# Patient Record
Sex: MALE | Race: WHITE | Employment: OTHER | URBAN - METROPOLITAN AREA
[De-identification: names, ages, dates, MRNs, and addresses within clinical notes are randomized per-mention and may not be internally consistent; named-entity substitution may affect disease eponyms.]

---

## 2017-04-06 ENCOUNTER — TELEPHONE (OUTPATIENT)
Dept: CARDIOLOGY CLINIC | Age: 67
End: 2017-04-06

## 2017-04-06 NOTE — TELEPHONE ENCOUNTER
Last OV Dec 2016. Metoprolol was switched to coreg. Goal BP per Dr Ani Marroquin  135/85. Was ok for a while then he started getting readings in the 140's and last week the PCP got 160/78. He has not had it checked since last Tuesday. He is taking the coreg, diltiazem and lisinopril/hct.

## 2017-04-06 NOTE — TELEPHONE ENCOUNTER
He did not know his HR so he will call the doctor's office and find out. I asked that he check his BP & HR today and do the same tomorrow AM and call me then we will know what med to change.

## 2017-04-06 NOTE — TELEPHONE ENCOUNTER
What is his HR? If HR > 65, then increase Coreg to 37.5 BID   If HR < 65, then increase lisinopril-HCTZ to 20-25. Goal BP at home < 135/85     Tell  him to let us know if BP remains high.      Thanks,   SK

## 2017-04-07 ENCOUNTER — TELEPHONE (OUTPATIENT)
Dept: CARDIOLOGY CLINIC | Age: 67
End: 2017-04-07

## 2017-04-07 NOTE — TELEPHONE ENCOUNTER
He said his BP at his pcp office the other day was 70 and today 73 w//93. Based on Dr Clemencia Carter instructions from yesterday have asked the pt to increase the coreg to 1 & 1/2 tabs twice daily. He understands. I told him to monitor his BP/HR for us but it may take a couple of weeks for the new dose to take effect.

## 2017-04-07 NOTE — TELEPHONE ENCOUNTER
Patient states that he is calling to follow up with bp and pulse readings. Can be reached at [phone]. Thanks!

## 2017-05-16 ENCOUNTER — TELEPHONE (OUTPATIENT)
Dept: CARDIOLOGY CLINIC | Age: 67
End: 2017-05-16

## 2017-05-16 NOTE — TELEPHONE ENCOUNTER
Pt calling in reference to his carvedilol. He stated he is still having the same issues and is almost out of medication. He requested a call back to [phone]. Thanks!

## 2017-05-16 NOTE — TELEPHONE ENCOUNTER
From phone message, early April, Dr Denise Heredia increased the coreg to 37.5 mg twice daily because his pressure was running the the 170's/90's and HR wsa low to mid 70's. He is now calling because HR still running around 72-73 and 's/low to mid 80's.

## 2017-05-17 RX ORDER — CARVEDILOL 25 MG/1
37.5 TABLET ORAL 2 TIMES DAILY WITH MEALS
Qty: 270 TAB | Refills: 3 | Status: SHIPPED | OUTPATIENT
Start: 2017-05-17 | End: 2018-05-02 | Stop reason: SDUPTHER

## 2017-05-17 RX ORDER — LISINOPRIL AND HYDROCHLOROTHIAZIDE 20; 25 MG/1; MG/1
1 TABLET ORAL DAILY
Qty: 30 TAB | Refills: 3 | Status: SHIPPED | OUTPATIENT
Start: 2017-05-17 | End: 2017-05-19 | Stop reason: DRUGHIGH

## 2017-05-18 ENCOUNTER — TELEPHONE (OUTPATIENT)
Dept: CARDIOLOGY CLINIC | Age: 67
End: 2017-05-18

## 2017-05-18 NOTE — TELEPHONE ENCOUNTER
Pt calling with questions about the increase to his Lisinopril. He can be reached at [phone].  [employer]

## 2017-05-18 NOTE — TELEPHONE ENCOUNTER
Ok - let's please correct the chart to the correct doses of meds he is taking. Continue lisinopril-HCTZ 40-25 if that is what he is taking. If HR still > 70 bpm, increase Diltiazem to 300 mg daily. Make sure he is watching his diet and keeping salt intake low. Have him see me in a few weeks. Have him bring all his meds to his next appt.      Thanks,   [employer]

## 2017-05-18 NOTE — TELEPHONE ENCOUNTER
Pt tells me that we had the incorrect dose of lisinopril in his chart. He WAS taking lisinopril-HCT 20/12.5 mg two tabs daily for a total of 40/25 mg. We thought it was one 20/12.5 mg pill & just this week, based on pt's BP results, Dr Shayla Knapp \"increased\" it to 20/25 mg leaving the lisinopril dose actually LESS than original & HCT exactly the same. Will forward to Dr Shayla Knapp for other adjustments.

## 2017-05-19 RX ORDER — LISINOPRIL AND HYDROCHLOROTHIAZIDE 12.5; 2 MG/1; MG/1
2 TABLET ORAL DAILY
Qty: 1 TAB | Refills: 0 | Status: SHIPPED | OUTPATIENT
Start: 2017-05-19 | End: 2017-10-31 | Stop reason: SDUPTHER

## 2017-05-19 RX ORDER — DILTIAZEM HYDROCHLORIDE 300 MG/1
300 CAPSULE, COATED, EXTENDED RELEASE ORAL DAILY
Qty: 30 CAP | Refills: 6 | Status: SHIPPED | OUTPATIENT
Start: 2017-05-19 | End: 2017-12-23 | Stop reason: SDUPTHER

## 2017-05-22 NOTE — TELEPHONE ENCOUNTER
He said right after he called us he got a phone call from the pharmacy that his script was ready. He is ok now.

## 2017-05-22 NOTE — TELEPHONE ENCOUNTER
Please call  Ciro Keke at [phone]. Re: elevated BP and medication.      Thank you, Sumeet Neumann

## 2017-10-31 RX ORDER — LISINOPRIL AND HYDROCHLOROTHIAZIDE 12.5; 2 MG/1; MG/1
2 TABLET ORAL DAILY
Qty: 60 TAB | Refills: 0 | Status: SHIPPED | OUTPATIENT
Start: 2017-10-31 | End: 2017-11-24 | Stop reason: SDUPTHER

## 2018-01-03 ENCOUNTER — OFFICE VISIT (OUTPATIENT)
Dept: CARDIOLOGY CLINIC | Age: 68
End: 2018-01-03

## 2018-01-03 VITALS
BODY MASS INDEX: 41.1 KG/M2 | OXYGEN SATURATION: 94 % | WEIGHT: 247 LBS | HEART RATE: 77 BPM | DIASTOLIC BLOOD PRESSURE: 100 MMHG | SYSTOLIC BLOOD PRESSURE: 160 MMHG

## 2018-01-03 DIAGNOSIS — I25.10 CORONARY ARTERY DISEASE INVOLVING NATIVE CORONARY ARTERY OF NATIVE HEART WITHOUT ANGINA PECTORIS: Primary | Chronic | ICD-10-CM

## 2018-01-03 DIAGNOSIS — E78.5 DYSLIPIDEMIA: ICD-10-CM

## 2018-01-03 DIAGNOSIS — I10 ESSENTIAL HYPERTENSION: Chronic | ICD-10-CM

## 2018-01-03 RX ORDER — DILTIAZEM HYDROCHLORIDE 300 MG/1
CAPSULE, COATED, EXTENDED RELEASE ORAL
Qty: 30 CAP | Refills: 0
Start: 2018-01-03 | End: 2018-05-02 | Stop reason: SDUPTHER

## 2018-01-03 RX ORDER — FLUTICASONE PROPIONATE 50 MCG
1 SPRAY, SUSPENSION (ML) NASAL DAILY
COMMUNITY
End: 2018-03-07

## 2018-01-03 RX ORDER — AZELASTINE HCL 205.5 UG/1
1 SPRAY NASAL DAILY
COMMUNITY

## 2018-01-03 RX ORDER — OMEPRAZOLE 20 MG/1
20 CAPSULE, DELAYED RELEASE ORAL DAILY
COMMUNITY

## 2018-01-03 NOTE — MR AVS SNAPSHOT
Visit Information Date & Time Provider Department Dept. Phone Encounter #  
 1/3/2018  4:00 PM David Case MD [facility] [place] [phone] [id number] Upcoming Health Maintenance Date Due Hepatitis C Screening 1950 FOOT EXAM Q1 7/16/1961 MICROALBUMIN Q1 7/16/1961 EYE EXAM RETINAL OR DILATED Q1 7/16/1961 DTaP/Tdap/Td series (1 - Tdap) 7/16/1972 FOBT Q 1 YEAR AGE 50-75 7/16/2001 ZOSTER VACCINE AGE 60> 5/16/2011 HEMOGLOBIN A1C Q6M 9/23/2015 GLAUCOMA SCREENING Q2Y 7/16/2016 MEDICARE YEARLY EXAM 7/16/2016 LIPID PANEL Q1 10/23/2016 Influenza Age 5 to Adult 8/1/2017 Pneumococcal 65+ Low/Medium Risk (2 of 2 - PPSV23) 12/1/2019 Allergies as of 1/3/2018  Review Complete On: 1/3/2018 By: David Case MD  
  
 Severity Noted Reaction Type Reactions Amoxicillin Medium 05/23/2013   Systemic Hives Current Immunizations  Reviewed on 3/25/2015 Name Date Influenza Vaccine 11/25/2014 Pneumococcal Vaccine (Unspecified Type) 12/1/2014 Not reviewed this visit You Were Diagnosed With   
  
 Codes Comments Coronary artery disease involving native coronary artery of native heart without angina pectoris    -  Primary ICD-10-CM: I25.10 ICD-9-CM: 414.01 Essential hypertension     ICD-10-CM: I10 
ICD-9-CM: 401.9 Dyslipidemia     ICD-10-CM: E78.5 ICD-9-CM: 272.4 Vitals BP Pulse Weight(growth percentile) SpO2 BMI Smoking Status (!) 160/100 (BP 1 Location: Left arm, BP Patient Position: Sitting) 77 247 lb (112 kg) 94% 41.1 kg/m2 Never Smoker Vitals History BMI and BSA Data Body Mass Index Body Surface Area  
 41.1 kg/m 2 2.27 m 2 Preferred Pharmacy Pharmacy Name Phone [phone] [facility] [address] [phone] Your Updated Medication List  
  
   
This list is accurate as of: 1/3/18  4:41 PM.  Always use your most recent med list.  
  
  
 ALEVE 220 mg tablet Generic drug:  naproxen sodium Take 220 mg by mouth once as needed for Pain. aspirin delayed-release 81 mg tablet Take 81 mg by mouth daily. Azelastine 0.15 % (205.5 mcg) nasal spray Commonly known as:  ASTEPRO  
1 Spray daily. carvedilol 25 mg tablet Commonly known as:  [place] Sloop Take 1.5 Tabs by mouth two (2) times daily (with meals). cetirizine 10 mg tablet Commonly known as:  ZYRTEC Take 10 mg by mouth nightly. cpap machine kit  
by Does Not Apply route. dilTIAZem  mg ER capsule Commonly known as:  CARDIZEM CD  
TAKE ONE CAPSULE BY MOUTH ONCE DAILY DOSE  ADJUSTMENT  PER  DR Mariana Phillips 137-50 mcg/spray [place] Generic drug:  azelastine-fluticasone  
by Nasal route two (2) times a day. FARXIGA 10 mg Tab tablet Generic drug:  dapagliflozin Take  by mouth daily. FLONASE 50 mcg/actuation nasal spray Generic drug:  fluticasone 1 [place] by Both Nostrils route daily. Indications: IN CONJUNTION WITH AZELASTINE  
  
 LANTUS 100 unit/mL injection Generic drug:  insulin glargine 200 Units daily. 60 units in the morning and 120 units in the evening. Indications: TOUJEO NOT LANTUS  
  
 lisinopril-hydroCHLOROthiazide 20-12.5 mg per tablet Commonly known as:  PRINZIDE, ZESTORETIC  
TAKE TWO TABLETS BY MOUTH ONCE DAILY  
  
 montelukast 10 mg tablet Commonly known as:  SINGULAIR Take 10 mg by mouth daily. omeprazole 20 mg capsule Commonly known as:  PRILOSEC Take 20 mg by mouth daily. rosuvastatin 20 mg tablet Commonly known as:  CRESTOR Take 1 Tab by mouth nightly. TRADJENTA 5 mg tablet Generic drug:  linagliptin Take 5 mg by mouth nightly. We Performed the Following ALDOSTERONE/RENIN RATIO [[id number] Custom] AMB POC EKG ROUTINE W/ 12 LEADS, INTER & REP [93610 CPT(R)] CBC W/O DIFF [84908 CPT(R)] LIPID PANEL [65106 CPT(R)] METABOLIC PANEL, COMPREHENSIVE [92509 CPT(R)] TSH 3RD GENERATION [47617 CPT(R)] Introducing [facility]! Dear Annabelle Del Rosario: Thank you for requesting a [employer] account. Our records indicate that you already have an active [employer] account. You can access your account anytime at [email or web address] Did you know that you can access your hospital and ER discharge instructions at any time in [employer]? You can also review all of your test results from your hospital stay or ER visit. Additional Information If you have questions, please visit the Frequently Asked Questions section of the [employer] website at [email or web address]/. Remember, [employer] is NOT to be used for urgent needs. For medical emergencies, dial 911. Now available from your iPhone and Android! Please provide this summary of care documentation to your next provider. Your primary care clinician is listed as Kym Ogden. If you have any questions after today's visit, please call [phone].

## 2018-01-03 NOTE — PROGRESS NOTES
Man Walsh MD. [facility]              Patient: Maisha Santana  : 1950      Today's Date: 1/3/2018        HISTORY OF PRESENT ILLNESS:     History of Present Illness:  Mr. Juhi Schwab is here for follow-up. No complaints. No CP or SOB. No dizziness. Taking his meds. PAST MEDICAL HISTORY:     Past Medical History:   Diagnosis Date    CAD (coronary artery disease)     NSTEMI 3/21/15; CABG 3/24/15;  LVEF 55%    [facility] ([facility] [address]     Dyslipidemia     FH ischemic heart disease     HTN (hypertension)     Hypertension     NSTEMI (non-ST elevated myocardial infarction) ([facility] [address]     NSTEMI 3/21/15    Obesity     MING (obstructive sleep apnea)     surgery in past    MING (obstructive sleep apnea)     on CPAP    Paronychia of great toe of right foot     S/P CABG x 4     CABG 3/24/15 - LIMA TO LAD, SEQUENTIAL VEIN GRAFT TO OM1, OM2; SVG TO DISTAL RCA       Past Surgical History:   Procedure Laterality Date    HX APPENDECTOMY      HX HEENT      eyes           MEDICATIONS:     Current Outpatient Prescriptions   Medication Sig Dispense Refill    omeprazole (PRILOSEC) 20 mg capsule Take 20 mg by mouth daily.  Azelastine (ASTEPRO) 0.15 % (205.5 mcg) nasal spray 1 Spray daily.  fluticasone (FLONASE) 50 mcg/actuation nasal spray 1 [place] by Both Nostrils route daily. Indications: IN CONJUNTION WITH AZELASTINE      dilTIAZem CD (CARDIZEM CD) 300 mg ER capsule TAKE ONE CAPSULE BY MOUTH ONCE DAILY DOSE  ADJUSTMENT  PER  DR EDGE 30 Cap 0    lisinopril-hydroCHLOROthiazide (PRINZIDE, ZESTORETIC) 20-12.5 mg per tablet TAKE TWO TABLETS BY MOUTH ONCE DAILY 30 Tab 5    carvedilol (COREG) 25 mg tablet Take 1.5 Tabs by mouth two (2) times daily (with meals). 270 Tab 3    cpap machine kit by Does Not Apply route.  montelukast (SINGULAIR) 10 mg tablet Take 10 mg by mouth daily.  rosuvastatin (CRESTOR) 20 mg tablet Take 1 Tab by mouth nightly.  90 Tab 3    dapagliflozin (FARXIGA) 10 mg tab Take  by mouth daily.  naproxen sodium (ALEVE) 220 mg tablet Take 220 mg by mouth once as needed for Pain.  cetirizine (ZYRTEC) 10 mg tablet Take 10 mg by mouth nightly.  aspirin delayed-release 81 mg tablet Take 81 mg by mouth daily.  linagliptin (TRADJENTA) 5 mg tablet Take 5 mg by mouth nightly.  insulin glargine (LANTUS) 100 unit/mL injection 200 Units daily. 60 units in the morning and 120 units in the evening. Indications: TOUJEO NOT LANTUS      azelastine-fluticasone (DYMISTA) 137-50 mcg/spray spry by Nasal route two (2) times a day. Allergies   Allergen Reactions    Amoxicillin Hives             SOCIAL HISTORY:     Social History   Substance Use Topics    Smoking status: Never Smoker    Smokeless tobacco: Never Used    Alcohol use 0.0 oz/week     0 Standard drinks or equivalent per week      Comment: rarely           FAMILY HISTORY:     Family History   Problem Relation Age of Onset    Coronary Artery Disease Mother     Coronary Artery Disease Father             REVIEW OF SYSTEMS:         Review of Systems:  Constitutional: Negative for fever, chills  HEENT: Negative for vision changes.    Respiratory: Negative for cough  Cardiovascular: Negative for orthopnea, syncope, and PND.    Gastrointestinal: Negative for abdominal pain, diarrhea, or melena  Genitourinary: Negative for dysuria  Musculoskeletal: Negative for myalgias.    Skin: Negative for rash  Heme: No problems bleeding. Neurological: Negative for speech change and focal weakness.                       PHYSICAL EXAM:        Physical Exam:  Visit Vitals    BP (!) 160/100 (BP 1 Location: Left arm, BP Patient Position: Sitting)    Pulse 77    Wt 247 lb (112 kg)    SpO2 94%    BMI 41.1 kg/m2       Patient appears generally well, mood and affect are appropriate and pleasant.  + obese    HEENT:  Hearing intact, non-icteric, normocephalic, atraumatic.    Neck Exam: Supple, No JVD or bruits   Lung Exam: Clear to auscultation, even breath sounds.    Cardiac Exam: Regular rate and rhythm with no murmur  Abdomen: Soft, non-tender, normal bowel sounds. + obese   Extremities: Moves all ext well. No lower extremity edema. Psych: Appropriate affect  Neuro - Grossly intact              LABS / OTHER STUDIES:            Labs 10/23/15 - A1c 8.0, chol 122, , HDL 26, LDL 51, non-HDL 96, LFT's OK, BMP OK  Labs 9/16 - chol 121, , HDL 25, LDL 48, LFT's OK.           CARDIAC DIAGNOSTICS:         Cardiac Evaluation Includes:  ECHO: 3/21/2015: EF 55-60%, no WMA, mild LVH, MAC, no MR    CATH: 3/23/2015: Obstructive 3VD:dLM:80%, mLAD:80%, dLAD:70%, D1:90% mLCX: 90%, mRCA:50%, PDA: 80%, EF 55%, Focal mid inferior AK. Intra-op WILEY 3/24/15 - postop LVEF 55%, mild MR  CABG 3/24/15 - LIMA TO LAD, SEQUENTIAL VEIN GRAFT TO OM1, OM2; SVG TO DISTAL RCA      EKG 3/21/15 - NSR, diffuse ST depression   EKG 5/16/16 - NSR, NSST changes   EKG 5/16/16 - NSR, NSST changes, PVC's           ASSESSMENT AND PLAN:         Assessment and Plan:  1) CAD  - NSTEMI 3/21/15; CABG 3/24/15; LVEF 55%  - Mr. Santana is doing well s/p CABG.    - Continue BB, statin, ACE-I, and ASA  - I encouraged he exercise and eat healthy       2) HTN    - BP is high today  - Plan is that he work on diet and weight loss and follow BP at home - goal < 135/85  - If BP remains high, then will have to add another agent (and workup secondary causes). Will check will/renin and TSH to start.        3) Dyslipidemia    - continue Crestor - prior LDL OK.     - Lipids per PCP       4) Diabetes Mellitus  - working with PCP       5) See me in 2 months. Patient expressed understanding of the plan - questions were answered.      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   Dane Montalvo MD, [place] [address] [place] [address]  [address] [place], [address] [place] [address]  Ph: [phone]   Ph [phone]                ADDENDUM   1/11/2018  Labs 1/5/18 - BMP OK, A1c 8.8, chol 122, .  HDL 24, LDL 40, LFT's OK, Panchito 6.6, renin not performed, CBC OK, TSH 2.2

## 2018-01-03 NOTE — PROGRESS NOTES
Pt has no complaints/no cardiac concerns    Visit Vitals    BP (!) 160/100 (BP 1 Location: Left arm, BP Patient Position: Sitting)    Pulse 77    Wt 247 lb (112 kg)    SpO2 94%    BMI 41.1 kg/m2

## 2018-03-07 ENCOUNTER — OFFICE VISIT (OUTPATIENT)
Dept: CARDIOLOGY CLINIC | Age: 68
End: 2018-03-07

## 2018-03-07 VITALS
OXYGEN SATURATION: 94 % | HEART RATE: 80 BPM | SYSTOLIC BLOOD PRESSURE: 142 MMHG | DIASTOLIC BLOOD PRESSURE: 82 MMHG | WEIGHT: 245 LBS | BODY MASS INDEX: 40.77 KG/M2

## 2018-03-07 DIAGNOSIS — E11.9 TYPE 2 DIABETES MELLITUS WITHOUT COMPLICATION, WITH LONG-TERM CURRENT USE OF INSULIN (HCC): Chronic | ICD-10-CM

## 2018-03-07 DIAGNOSIS — Z79.4 TYPE 2 DIABETES MELLITUS WITHOUT COMPLICATION, WITH LONG-TERM CURRENT USE OF INSULIN (HCC): Chronic | ICD-10-CM

## 2018-03-07 DIAGNOSIS — I10 ESSENTIAL HYPERTENSION: Primary | Chronic | ICD-10-CM

## 2018-03-07 RX ORDER — LISINOPRIL AND HYDROCHLOROTHIAZIDE 12.5; 2 MG/1; MG/1
2 TABLET ORAL DAILY
Qty: 180 TAB | Refills: 5 | Status: SHIPPED | OUTPATIENT
Start: 2018-03-07 | End: 2019-06-02 | Stop reason: SDUPTHER

## 2018-03-07 RX ORDER — EPLERENONE 25 MG/1
25 TABLET, FILM COATED ORAL DAILY
Qty: 90 TAB | Refills: 3 | Status: SHIPPED | OUTPATIENT
Start: 2018-03-07 | End: 2019-04-17 | Stop reason: SDUPTHER

## 2018-03-07 NOTE — PROGRESS NOTES
Marycruz Arvizu MD. [facility] [place]              Patient: Na Santana  : 1950      Today's Date: 3/7/2018            HISTORY OF PRESENT ILLNESS:     History of Present Illness:  Here for follow-up. No complaints. No CP or SOB. No dizziness. PAST MEDICAL HISTORY:     Past Medical History:   Diagnosis Date    CAD (coronary artery disease)     NSTEMI 3/21/15; CABG 3/24/15;  LVEF 55%    [facility] [address]     Dyslipidemia     FH ischemic heart disease     HTN (hypertension)     Hypertension     NSTEMI (non-ST elevated myocardial infarction) ([facility] [address]     NSTEMI 3/21/15    Obesity     MING (obstructive sleep apnea)     surgery in past    MING (obstructive sleep apnea)     on CPAP    Paronychia of great toe of right foot     S/P CABG x 4     CABG 3/24/15 - LIMA TO LAD, SEQUENTIAL VEIN GRAFT TO OM1, OM2; SVG TO DISTAL RCA         Past Surgical History:   Procedure Laterality Date    HX APPENDECTOMY      HX HEENT      eyes           MEDICATIONS:     Current Outpatient Prescriptions   Medication Sig Dispense Refill    insulin glargine 300 unit/mL (1.5 mL) inpn 200 Units by SubCUTAneous route nightly. Indications: toujeo      Liraglutide (VICTOZA) 0.6 mg/0.1 mL (18 mg/3 mL) pnij 1.8 mg by SubCUTAneous route.  omeprazole (PRILOSEC) 20 mg capsule Take 20 mg by mouth daily.  Azelastine (ASTEPRO) 0.15 % (205.5 mcg) nasal spray 1 Spray daily.  dilTIAZem CD (CARDIZEM CD) 300 mg ER capsule TAKE ONE CAPSULE BY MOUTH ONCE DAILY DOSE  ADJUSTMENT  PER  DR EDGE 30 Cap 0    lisinopril-hydroCHLOROthiazide (PRINZIDE, ZESTORETIC) 20-12.5 mg per tablet TAKE TWO TABLETS BY MOUTH ONCE DAILY 30 Tab 5    carvedilol (COREG) 25 mg tablet Take 1.5 Tabs by mouth two (2) times daily (with meals). 270 Tab 3    cpap machine kit by Does Not Apply route.  montelukast (SINGULAIR) 10 mg tablet Take 10 mg by mouth daily.  rosuvastatin (CRESTOR) 20 mg tablet Take 1 Tab by mouth nightly.  90 Tab 3    dapagliflozin (FARXIGA) 10 mg tab Take  by mouth daily.  naproxen sodium (ALEVE) 220 mg tablet Take 220 mg by mouth once as needed for Pain.  cetirizine (ZYRTEC) 10 mg tablet Take 10 mg by mouth nightly.  aspirin delayed-release 81 mg tablet Take 81 mg by mouth daily.  linagliptin (TRADJENTA) 5 mg tablet Take 5 mg by mouth nightly. Allergies   Allergen Reactions    Amoxicillin Hives             SOCIAL HISTORY:     Social History   Substance Use Topics    Smoking status: Never Smoker    Smokeless tobacco: Never Used    Alcohol use 0.0 oz/week     0 Standard drinks or equivalent per week      Comment: rarely         FAMILY HISTORY:     Family History   Problem Relation Age of Onset    Coronary Artery Disease Mother     Coronary Artery Disease Father             REVIEW OF SYSTEMS:         Review of Systems:  Constitutional: Negative for fever, chills  HEENT: Negative for vision changes.    Respiratory: Negative for cough  Cardiovascular: Negative for orthopnea, syncope, and PND.    Gastrointestinal: Negative for abdominal pain, diarrhea, or melena  Genitourinary: Negative for dysuria  Musculoskeletal: Negative for myalgias.    Skin: Negative for rash  Heme: No problems bleeding. Neurological: Negative for speech change and focal weakness.                       PHYSICAL EXAM:        Physical Exam:  Visit Vitals    /82 (BP 1 Location: Left arm, BP Patient Position: Sitting)    Pulse 80    Wt 245 lb (111.1 kg)    SpO2 94%    BMI 40.77 kg/m2        Patient appears generally well, mood and affect are appropriate and pleasant. + obese    HEENT:  Hearing intact, non-icteric, normocephalic, atraumatic.    Neck Exam: Supple, No JVD  Lung Exam: Clear to auscultation, even breath sounds.    Cardiac Exam: Regular rate and rhythm with no murmur  Abdomen: Soft, non-tender,  + obese   Extremities: Moves all ext well. No lower extremity edema.   Psych: Appropriate affect  Neuro - Grossly intact              LABS / OTHER STUDIES:             Labs 10/23/15 - A1c 8.0, chol 122, , HDL 26, LDL 51, non-HDL 96, LFT's OK, BMP OK  Labs 9/16 - chol 121, , HDL 25, LDL 48, LFT's OK. Labs 1/5/18 - BMP OK, A1c 8.8, chol 122, . HDL 24, LDL 40, LFT's OK, Panchito 6.6, renin not performed, CBC OK, TSH 2.2          CARDIAC DIAGNOSTICS:         Cardiac Evaluation Includes:  ECHO: 3/21/2015: EF 55-60%, no WMA, mild LVH, MAC, no MR    CATH: 3/23/2015: Obstructive 3VD:dLM:80%, mLAD:80%, dLAD:70%, D1:90% mLCX: 90%, mRCA:50%, PDA: 80%, EF 55%, Focal mid inferior AK. Intra-op WILEY 3/24/15 - postop LVEF 55%, mild MR  CABG 3/24/15 - LIMA TO LAD, SEQUENTIAL VEIN GRAFT TO OM1, OM2; SVG TO DISTAL RCA      EKG 3/21/15 - NSR, diffuse ST depression   EKG 5/16/16 - NSR, NSST changes   EKG 5/16/16 - NSR, NSST changes, PVC's           ASSESSMENT AND PLAN:         Assessment and Plan:  1) CAD  - NSTEMI 3/21/15; CABG 3/24/15; LVEF 55%  - Mr. Santana is doing well s/p CABG - no anginal complaints   - Continue BB, statin, ACE-I, and ASA  - I encouraged he exercise and eat healthy       2) HTN    - on multiple meds  - BP still mildly high   - BP goal < 130/80   - needs to work on a better diet  - Add Eplerenone 25 mg daily - recheck BMP in 4 days and 2 weeks     3) Dyslipidemia    - continue Crestor - prior LDL OK.     - Lipids per PCP       4) Diabetes Mellitus  - working with PCP       5) See me in 2 months. Patient expressed understanding of the plan - questions were answered.      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    Helen Olvera MD, [address], [address]  [place], [address] [place] [address]  Ph: [phone]   Ph [phone]

## 2018-03-07 NOTE — MR AVS SNAPSHOT
[address] [address] 
[phone] Patient: Peyton Santana MRN: [id number] OPN:[id number] Visit Information Date & Time Provider Department Dept. Phone Encounter #  
 3/7/2018  4:00 PM Jorge Farris MD [facility] Blair Schaefer [phone] [id number] Upcoming Health Maintenance Date Due Hepatitis C Screening 1950 FOOT EXAM Q1 7/16/1961 MICROALBUMIN Q1 7/16/1961 EYE EXAM RETINAL OR DILATED Q1 7/16/1961 DTaP/Tdap/Td series (1 - Tdap) 7/16/1972 FOBT Q 1 YEAR AGE 50-75 7/16/2001 ZOSTER VACCINE AGE 60> 5/16/2011 GLAUCOMA SCREENING Q2Y 7/16/2016 MEDICARE YEARLY EXAM 7/16/2016 Influenza Age 5 to Adult 8/1/2017 HEMOGLOBIN A1C Q6M 7/5/2018 LIPID PANEL Q1 1/5/2019 Pneumococcal 65+ Low/Medium Risk (2 of 2 - PPSV23) 12/1/2019 Allergies as of 3/7/2018  Review Complete On: 3/7/2018 By: Jorge Farris MD  
  
 Severity Noted Reaction Type Reactions Amoxicillin Medium 05/23/2013   Systemic Hives Current Immunizations  Reviewed on 3/25/2015 Name Date Influenza Vaccine 11/25/2014 Pneumococcal Vaccine (Unspecified Type) 12/1/2014 Not reviewed this visit You Were Diagnosed With   
  
 Codes Comments Essential hypertension    -  Primary ICD-10-CM: I10 
ICD-9-CM: 401.9 Type 2 diabetes mellitus without complication, with long-term current use of insulin (HCC)     ICD-10-CM: E11.9, Z79.4 ICD-9-CM: 250.00, V58.67 Vitals BP Pulse Weight(growth percentile) SpO2 BMI Smoking Status 142/82 (BP 1 Location: Left arm, BP Patient Position: Sitting) 80 245 lb (111.1 kg) 94% 40.77 kg/m2 Never Smoker Vitals History BMI and BSA Data Body Mass Index Body Surface Area 40.77 kg/m 2 2.26 m 2 Preferred Pharmacy Pharmacy Name Phone [facility], [address] [place] [phone] Your Updated Medication List  
  
   
This list is accurate as of 3/7/18  4:37 PM.  Always use your most recent med list.  
  
  
  
  
 ALEVE 220 mg tablet Generic drug:  naproxen sodium Take 220 mg by mouth once as needed for Pain. aspirin delayed-release 81 mg tablet Take 81 mg by mouth daily. Azelastine 0.15 % (205.5 mcg) nasal spray Commonly known as:  ASTEPRO  
1 Spray daily. carvedilol 25 mg tablet Commonly known as:  Gregoria Quiroz Take 1.5 Tabs by mouth two (2) times daily (with meals). cetirizine 10 mg tablet Commonly known as:  ZYRTEC Take 10 mg by mouth nightly. cpap machine kit  
by Does Not Apply route. dilTIAZem  mg ER capsule Commonly known as:  CARDIZEM CD  
TAKE ONE CAPSULE BY MOUTH ONCE DAILY DOSE  ADJUSTMENT  PER  DR EDGE  
  
 eplerenone 25 mg tablet Commonly known as:  Estevan Richards Take 1 Tab by mouth daily. FARXIGA 10 mg Tab tablet Generic drug:  dapagliflozin Take  by mouth daily. insulin glargine 300 unit/mL (1.5 mL) Inpn  
200 Units by SubCUTAneous route nightly. Indications: toujeo Liraglutide 0.6 mg/0.1 mL (18 mg/3 mL) Pnij Commonly known as:  VICTOZA  
1.8 mg by SubCUTAneous route. lisinopril-hydroCHLOROthiazide 20-12.5 mg per tablet Commonly known as:  Clifton Damme Take 2 Tabs by mouth daily. montelukast 10 mg tablet Commonly known as:  SINGULAIR Take 10 mg by mouth daily. omeprazole 20 mg capsule Commonly known as:  PRILOSEC Take 20 mg by mouth daily. rosuvastatin 20 mg tablet Commonly known as:  CRESTOR Take 1 Tab by mouth nightly. TRADJENTA 5 mg tablet Generic drug:  linagliptin Take 5 mg by mouth nightly. Prescriptions Sent to Pharmacy Refills  
 eplerenone (INSPRA) 25 mg tablet 3 Sig: Take 1 Tab by mouth daily. Class: Normal  
 Pharmacy: [facility] [address], [address] Ph #: [phone] Route: Oral  
 lisinopril-hydroCHLOROthiazide (PRINZIDE, ZESTORETIC) 20-12.5 mg per tablet 5 Sig: Take 2 Tabs by mouth daily. Class: Normal  
 Pharmacy: [facility] [address], [address] [place] Ph #: [phone] Route: Oral  
  
We Performed the Following METABOLIC PANEL, BASIC [36592 CPT(R)] METABOLIC PANEL, BASIC [27690 CPT(R)] Introducing [facility]! Dear Aditya Space: Thank you for requesting a SnapNames account. Our records indicate that you already have an active SnapNames account. You can access your account anytime at [email or web address] Did you know that you can access your hospital and ER discharge instructions at any time in SnapNames? You can also review all of your test results from your hospital stay or ER visit. Additional Information If you have questions, please visit the Frequently Asked Questions section of the SnapNames website at [email or web address] Remember, SnapNames is NOT to be used for urgent needs. For medical emergencies, dial 911. Now available from your iPhone and Android! Please provide this summary of care documentation to your next provider. Your primary care clinician is listed as Darci Padron. If you have any questions after today's visit, please call [phone].

## 2018-03-07 NOTE — PROGRESS NOTES
Pt has no complaints/no cardiac concerns    Visit Vitals    /82 (BP 1 Location: Left arm, BP Patient Position: Sitting)    Pulse 80    Wt 245 lb (111.1 kg)    SpO2 94%    BMI 40.77 kg/m2

## 2018-03-15 LAB
BUN SERPL-MCNC: 18 MG/DL (ref 8–27)
BUN/CREAT SERPL: 16 (ref 10–24)
CALCIUM SERPL-MCNC: 9.9 MG/DL (ref 8.6–10.2)
CHLORIDE SERPL-SCNC: 96 MMOL/L (ref 96–106)
CO2 SERPL-SCNC: 29 MMOL/L (ref 18–29)
CREAT SERPL-MCNC: 1.1 MG/DL (ref 0.76–1.27)
GFR SERPLBLD CREATININE-BSD FMLA CKD-EPI: 70 ML/MIN/1.73
GFR SERPLBLD CREATININE-BSD FMLA CKD-EPI: 80 ML/MIN/1.73
GLUCOSE SERPL-MCNC: 168 MG/DL (ref 65–99)
POTASSIUM SERPL-SCNC: 4.4 MMOL/L (ref 3.5–5.2)
SODIUM SERPL-SCNC: 140 MMOL/L (ref 134–144)

## 2018-05-02 ENCOUNTER — OFFICE VISIT (OUTPATIENT)
Dept: CARDIOLOGY CLINIC | Age: 68
End: 2018-05-02

## 2018-05-02 VITALS
DIASTOLIC BLOOD PRESSURE: 72 MMHG | OXYGEN SATURATION: 95 % | WEIGHT: 243 LBS | SYSTOLIC BLOOD PRESSURE: 132 MMHG | BODY MASS INDEX: 40.44 KG/M2 | HEART RATE: 80 BPM

## 2018-05-02 DIAGNOSIS — E78.5 DYSLIPIDEMIA: ICD-10-CM

## 2018-05-02 DIAGNOSIS — I25.10 CORONARY ARTERY DISEASE INVOLVING NATIVE CORONARY ARTERY OF NATIVE HEART WITHOUT ANGINA PECTORIS: Primary | Chronic | ICD-10-CM

## 2018-05-02 DIAGNOSIS — I10 ESSENTIAL HYPERTENSION: Chronic | ICD-10-CM

## 2018-05-02 RX ORDER — DILTIAZEM HYDROCHLORIDE 300 MG/1
300 CAPSULE, COATED, EXTENDED RELEASE ORAL DAILY
Qty: 90 CAP | Refills: 3 | Status: SHIPPED | OUTPATIENT
Start: 2018-05-02 | End: 2019-07-09 | Stop reason: SDUPTHER

## 2018-05-02 RX ORDER — CARVEDILOL 25 MG/1
37.5 TABLET ORAL 2 TIMES DAILY WITH MEALS
Qty: 270 TAB | Refills: 3 | Status: SHIPPED | OUTPATIENT
Start: 2018-05-02 | End: 2019-05-26 | Stop reason: SDUPTHER

## 2018-05-02 NOTE — MR AVS SNAPSHOT
[address] [address] 
[phone] Patient: Artemio Santana MRN: [id number] [facility]:5/31/[birth year]28 Visit Information Date & Time Provider Department Dept. Phone Encounter #  
 5/2/2018  4:00 PM Jenna Haney MD [facility] [place] [phone] [id number] Your Appointments 5/8/2019  4:00 PM  
ESTABLISHED PATIENT with Jenna Haney MD  
[facility] ([address]) Appt Note: 1 yr fu appt sll  
 [address] [id number] [phone]  
  
   
 [address] [address] Upcoming Health Maintenance Date Due Hepatitis C Screening 1950 FOOT EXAM Q1 7/16/1961 MICROALBUMIN Q1 7/16/1961 EYE EXAM RETINAL OR DILATED Q1 7/16/1961 DTaP/Tdap/Td series (1 - Tdap) 7/16/1972 FOBT Q 1 YEAR AGE 50-75 7/16/2001 ZOSTER VACCINE AGE 60> 5/16/2011 GLAUCOMA SCREENING Q2Y 7/16/2016 HEMOGLOBIN A1C Q6M 7/5/2018 Influenza Age 5 to Adult 8/1/2018 LIPID PANEL Q1 1/5/2019 Pneumococcal 65+ Low/Medium Risk (2 of 2 - PPSV23) 12/1/2019 Allergies as of 5/2/2018  Review Complete On: 5/2/2018 By: Jenna Haney MD  
  
 Severity Noted Reaction Type Reactions Amoxicillin Medium 05/23/2013   Systemic Hives Current Immunizations  Reviewed on 3/25/2015 Name Date Influenza Vaccine 11/25/2014 Pneumococcal Vaccine (Unspecified Type) 12/1/2014 Not reviewed this visit You Were Diagnosed With   
  
 Codes Comments Coronary artery disease involving native coronary artery of native heart without angina pectoris    -  Primary ICD-10-CM: I25.10 ICD-9-CM: 414.01 Essential hypertension     ICD-10-CM: I10 
ICD-9-CM: 401.9 Dyslipidemia     ICD-10-CM: E78.5 ICD-9-CM: 272.4 Vitals BP Pulse Weight(growth percentile) SpO2 BMI Smoking Status  132/72 (BP 1 Location: Left arm, BP Patient Position: Sitting) 80 243 lb (110.2 kg) 95% 40.44 kg/m2 Never Smoker Vitals History BMI and BSA Data Body Mass Index Body Surface Area 40.44 kg/m 2 2.25 m 2 Preferred Pharmacy Pharmacy Name Phone [address] [place] [phone] Your Updated Medication List  
  
   
This list is accurate as of 5/2/18  4:17 PM.  Always use your most recent med list.  
  
  
  
  
 ALEVE 220 mg tablet Generic drug:  naproxen sodium Take 220 mg by mouth once as needed for Pain. aspirin delayed-release 81 mg tablet Take 81 mg by mouth daily. Azelastine 0.15 % (205.5 mcg) nasal spray Commonly known as:  ASTEPRO  
1 Spray daily. carvedilol 25 mg tablet Commonly known as:  Keesha Awe Take 1.5 Tabs by mouth two (2) times daily (with meals). cetirizine 10 mg tablet Commonly known as:  ZYRTEC Take 10 mg by mouth nightly. cpap machine kit  
by Does Not Apply route. dilTIAZem  mg ER capsule Commonly known as:  CARDIZEM CD Take 1 Cap by mouth daily. eplerenone 25 mg tablet Commonly known as:  Ector Cook Take 1 Tab by mouth daily. FARXIGA 10 mg Tab tablet Generic drug:  dapagliflozin Take  by mouth daily. insulin glargine 300 unit/mL (1.5 mL) Inpn  
200 Units by SubCUTAneous route nightly. Indications: toujeo Liraglutide 0.6 mg/0.1 mL (18 mg/3 mL) Pnij Commonly known as:  VICTOZA  
1.8 mg by SubCUTAneous route. lisinopril-hydroCHLOROthiazide 20-12.5 mg per tablet Commonly known as:  Thomasena Alu Take 2 Tabs by mouth daily. montelukast 10 mg tablet Commonly known as:  SINGULAIR Take 10 mg by mouth daily. omeprazole 20 mg capsule Commonly known as:  PRILOSEC Take 20 mg by mouth daily. rosuvastatin 20 mg tablet Commonly known as:  CRESTOR Take 1 Tab by mouth nightly. TRADJENTA 5 mg tablet Generic drug:  linagliptin Take 5 mg by mouth nightly. Prescriptions Sent to Pharmacy Refills  
 carvedilol (COREG) 25 mg tablet 3 Sig: Take 1.5 Tabs by mouth two (2) times daily (with meals). Class: Normal  
 [facility] [address] [place] Ph #: [phone] Route: Oral  
 dilTIAZem CD (CARDIZEM CD) 300 mg ER capsule 3 Sig: Take 1 Cap by mouth daily. Class: Normal  
 [facility] [address] [place] Ph #: [phone] Route: Oral  
  
Introducing [facility]! Dear Sabas Duarte: Thank you for requesting a 24/7 Card account. Our records indicate that you already have an active 24/7 Card account. You can access your account anytime at [email or web address] Did you know that you can access your hospital and ER discharge instructions at any time in 24/7 Card? You can also review all of your test results from your hospital stay or ER visit. Additional Information If you have questions, please visit the Frequently Asked Questions section of the 24/7 Card website at [email or web address] Remember, 24/7 Card is NOT to be used for urgent needs. For medical emergencies, dial 911. Now available from your iPhone and Android! Please provide this summary of care documentation to your next provider. Your primary care clinician is listed as Lily Montiel. If you have any questions after today's visit, please call [phone].

## 2018-05-02 NOTE — PROGRESS NOTES
Pt has no complaints/no cardiac concerns    Visit Vitals    /72 (BP 1 Location: Left arm, BP Patient Position: Sitting)    Pulse 80    Wt 243 lb (110.2 kg)    SpO2 95%    BMI 40.44 kg/m2

## 2018-05-02 NOTE — PROGRESS NOTES
Dena Chang MD. [facility]              Patient: Roshni Santana  : 1950      Today's Date: 2018            HISTORY OF PRESENT ILLNESS:     History of Present Illness:  Mr. Mordecai Boas is here for follow-up. He is doing well. No complaints. No CP or SOB. Staying active exercising. PAST MEDICAL HISTORY:     Past Medical History:   Diagnosis Date    CAD (coronary artery disease)     NSTEMI 3/21/15; CABG 3/24/15;  LVEF 55%    Diabetes ([facility] [address]     Dyslipidemia     FH ischemic heart disease     HTN (hypertension)     Hypertension     NSTEMI (non-ST elevated myocardial infarction) ([facility] [address]     NSTEMI 3/21/15    Obesity     MING (obstructive sleep apnea)     surgery in past    MING (obstructive sleep apnea)     on CPAP    Paronychia of great toe of right foot     S/P CABG x 4     CABG 3/24/15 - LIMA TO LAD, SEQUENTIAL VEIN GRAFT TO OM1, OM2; SVG TO DISTAL RCA       Past Surgical History:   Procedure Laterality Date    HX APPENDECTOMY      HX HEENT      eyes         MEDICATIONS:     Current Outpatient Prescriptions   Medication Sig Dispense Refill    insulin glargine 300 unit/mL (1.5 mL) inpn 200 Units by SubCUTAneous route nightly. Indications: toujeo      Liraglutide (VICTOZA) 0.6 mg/0.1 mL (18 mg/3 mL) pnij 1.8 mg by SubCUTAneous route.  eplerenone (INSPRA) 25 mg tablet Take 1 Tab by mouth daily. 90 Tab 3    lisinopril-hydroCHLOROthiazide (PRINZIDE, ZESTORETIC) 20-12.5 mg per tablet Take 2 Tabs by mouth daily. 180 Tab 5    omeprazole (PRILOSEC) 20 mg capsule Take 20 mg by mouth daily.  Azelastine (ASTEPRO) 0.15 % (205.5 mcg) nasal spray 1 Spray daily.  dilTIAZem CD (CARDIZEM CD) 300 mg ER capsule TAKE ONE CAPSULE BY MOUTH ONCE DAILY DOSE  ADJUSTMENT  PER  DR EDGE 30 Cap 0    carvedilol (COREG) 25 mg tablet Take 1.5 Tabs by mouth two (2) times daily (with meals). 270 Tab 3    cpap machine kit by Does Not Apply route.       montelukast (SINGULAIR) 10 mg tablet Take 10 mg by mouth daily.  rosuvastatin (CRESTOR) 20 mg tablet Take 1 Tab by mouth nightly. 90 Tab 3    dapagliflozin (FARXIGA) 10 mg tab Take  by mouth daily.  naproxen sodium (ALEVE) 220 mg tablet Take 220 mg by mouth once as needed for Pain.  cetirizine (ZYRTEC) 10 mg tablet Take 10 mg by mouth nightly.  aspirin delayed-release 81 mg tablet Take 81 mg by mouth daily.  linagliptin (TRADJENTA) 5 mg tablet Take 5 mg by mouth nightly. Allergies   Allergen Reactions    Amoxicillin Hives             SOCIAL HISTORY:     Social History   Substance Use Topics    Smoking status: Never Smoker    Smokeless tobacco: Never Used    Alcohol use 0.0 oz/week     0 Standard drinks or equivalent per week      Comment: rarely         FAMILY HISTORY:     Family History   Problem Relation Age of Onset    Coronary Artery Disease Mother     Coronary Artery Disease Father             REVIEW OF SYSTEMS:         Review of Systems:  Constitutional: Negative for fever, chills  HEENT: Negative for vision changes.    Respiratory: Negative for cough  Cardiovascular: Negative for orthopnea, syncope, and PND.    Gastrointestinal: Negative for abdominal pain, diarrhea, or melena  Genitourinary: Negative for dysuria  Musculoskeletal: Negative for myalgias.    Skin: Negative for rash  Heme: No problems bleeding. Neurological: Negative for speech change and focal weakness.                       PHYSICAL EXAM:        Physical Exam:  Visit Vitals    /72 (BP 1 Location: Left arm, BP Patient Position: Sitting)    Pulse 80    Wt 243 lb (110.2 kg)    SpO2 95%    BMI 40.44 kg/m2       Patient appears generally well, mood and affect are appropriate and pleasant.  + obese    HEENT:  Hearing intact, non-icteric, normocephalic, atraumatic.    Neck Exam: Supple, No JVD  Lung Exam: Clear to auscultation, even breath sounds.    Cardiac Exam: Regular rate and rhythm with no murmur  Abdomen: Soft, non-tender,  + obese Extremities: Moves all ext well. No lower extremity edema. Psych: Appropriate affect  Neuro - Grossly intact              LABS / OTHER STUDIES:           Lab Results   Component Value Date/Time    Sodium 140 03/14/2018 09:09 AM    Potassium 4.4 03/14/2018 09:09 AM    Chloride 96 03/14/2018 09:09 AM    CO2 29 03/14/2018 09:09 AM    Anion gap 10 03/28/2015 03:52 AM    Glucose 168 (H) 03/14/2018 09:09 AM    BUN 18 03/14/2018 09:09 AM    Creatinine 1.10 03/14/2018 09:09 AM    BUN/Creatinine ratio 16 03/14/2018 09:09 AM    GFR est AA 80 03/14/2018 09:09 AM    GFR est non-AA 70 03/14/2018 09:09 AM    Calcium 9.9 03/14/2018 09:09 AM           Labs 10/23/15 - A1c 8.0, chol 122, , HDL 26, LDL 51, non-HDL 96, LFT's OK, BMP OK  Labs 9/16 - chol 121, , HDL 25, LDL 48, LFT's OK. Labs 1/5/18 - BMP OK, A1c 8.8, chol 122, . HDL 24, LDL 40, LFT's OK, Panchito 6.6, renin not performed, CBC OK, TSH 2.2          CARDIAC DIAGNOSTICS:         Cardiac Evaluation Includes:  ECHO: 3/21/2015: EF 55-60%, no WMA, mild LVH, MAC, no MR    CATH: 3/23/2015: Obstructive 3VD:dLM:80%, mLAD:80%, dLAD:70%, D1:90% mLCX: 90%, mRCA:50%, PDA: 80%, EF 55%, Focal mid inferior AK. Intra-op WILEY 3/24/15 - postop LVEF 55%, mild MR  CABG 3/24/15 - LIMA TO LAD, SEQUENTIAL VEIN GRAFT TO OM1, OM2; SVG TO DISTAL RCA      EKG 3/21/15 - NSR, diffuse ST depression   EKG 5/16/16 - NSR, NSST changes   EKG 5/16/16 - NSR, NSST changes, PVC's           ASSESSMENT AND PLAN:         Assessment and Plan:  1) CAD  - NSTEMI 3/21/15; CABG 3/24/15; LVEF 55%  - Mr.  Sweger is doing well s/p CABG - no anginal complaints   - Continue BB, statin, ACE-I, and ASA  - I encouraged he exercise and eat healthy       2) HTN    - on multiple meds his BP looks good   - BP goal < 130/80      3) Dyslipidemia    - continue Crestor - prior LDL OK.     - Lipids per PCP       4) Diabetes Mellitus  - working with PCP       5) See me in 12 months.   Patient expressed understanding of the plan - questions were answered.      He works maintenance for [employer]. Has 2 young grand kids.    Cisco Cantrell MD, [address]  [address] [place] [address], [address] [place] [address]  Ph: [phone]                                                             Ph [phone]

## 2018-11-27 NOTE — TELEPHONE ENCOUNTER
Above RN's/Staff's notes reviewed.  Reviewed medications and allergies.    SUBJECTIVE:    Bethanie Cordova is a 27 year old female who presents with  presenting with URI for past 2 weeks. Has deep cough and vocal hoarseness for past week. Cough productive of yellow.She is masked. Low-grade fever to 100.0 in evening. Does have body aches and chills. Did obtain flu vaccine. No ill contacts at home. 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 Also works at [employer] and will need work excuse.  Denies known Latex allergy or symptoms of Latex sensitivity.        Medication verified and med list updated  Patient would like communication of their results via:       OBJECTIVE:    Vitals:    11/16/18 1725   BP: 135/60   Pulse: 85   Resp: 16   Temp: 98.3 °F (36.8 °C)       General appearance:  Healthy, alert, in no distress, cooperative  Skin:  Skin color, texture, turgor are normal. There are no bruises, rashes or lesions.  Eyes: Conjunctivae and sclerae normal and pupils equal, round, reactive to light    Nose: Nasal turbinate swelling with erythema yellow rhinorrhea tenderness over the maxillary sinuses  Ears:  External ears normal. Canals clear. Tympanic membranes are clear with all landmarks noted.  Throat:  Normal without tonsillar hypertrophy, no erythema, exudates or mucosal lesions. Voice is quite raspy and diminished  Neck:  Supple, no lymphadenopathy.  Lungs:  Scattered rhonchi harsh pleuritic cough with wheezing throughout no moist rales.  Cardiac:  Regular rate and rhythm, no rubs, click gallops or murmurs  Abdomen:  Soft, no tenderness, bowel sounds normoactive  Extremities:  No pretibial edema      ASSESSMENT:  1. Acute wheezy bronchitis    2. Acute non-recurrent maxillary sinusitis    3. Acute laryngitis without obstruction          PLAN:  Orders Placed This Encounter   • albuterol-ipratropium 2.5 mg/0.5 mg (DUONEB) nebulizer solution 3 mL   • amoxicillin-clavulanate (AUGMENTIN) 875-125 MG per tablet   • predniSONE  Patient calling in regards to his bp. Would like to discuss medication changes. Can be reached at [phone]. Thanks! (DELTASONE) 10 MG tablet   • albuterol 108 (90 Base) MCG/ACT inhaler   • guaiFENesin-codeine (CHERATUSSIN AC) 100-10 MG/5ML liquid       · Rest and increase activity as tolerated.  · Recheck with PCP or WIC, if not improving this week.  · Tylenol for fever prn., Augmentin for infection prednisone for inflammation albuterol for wheezing and Cheratussin for cough  · Call if condition worsens

## 2019-04-11 DIAGNOSIS — Z79.4 TYPE 2 DIABETES MELLITUS WITHOUT COMPLICATION, WITH LONG-TERM CURRENT USE OF INSULIN (HCC): Chronic | ICD-10-CM

## 2019-04-11 DIAGNOSIS — E11.9 TYPE 2 DIABETES MELLITUS WITHOUT COMPLICATION, WITH LONG-TERM CURRENT USE OF INSULIN (HCC): Chronic | ICD-10-CM

## 2019-04-11 DIAGNOSIS — I10 ESSENTIAL HYPERTENSION: Chronic | ICD-10-CM

## 2019-04-17 DIAGNOSIS — Z79.4 TYPE 2 DIABETES MELLITUS WITHOUT COMPLICATION, WITH LONG-TERM CURRENT USE OF INSULIN (HCC): Chronic | ICD-10-CM

## 2019-04-17 DIAGNOSIS — E11.9 TYPE 2 DIABETES MELLITUS WITHOUT COMPLICATION, WITH LONG-TERM CURRENT USE OF INSULIN (HCC): Chronic | ICD-10-CM

## 2019-04-17 DIAGNOSIS — I10 ESSENTIAL HYPERTENSION: Chronic | ICD-10-CM

## 2019-04-17 RX ORDER — EPLERENONE 25 MG/1
25 TABLET, FILM COATED ORAL DAILY
Qty: 90 TAB | Refills: 1 | Status: SHIPPED | OUTPATIENT
Start: 2019-04-17 | End: 2020-05-06

## 2019-04-24 RX ORDER — EPLERENONE 25 MG/1
TABLET, FILM COATED ORAL
Qty: 90 TAB | Refills: 1 | Status: SHIPPED | OUTPATIENT
Start: 2019-04-24 | End: 2019-04-24 | Stop reason: SDUPTHER

## 2019-05-08 ENCOUNTER — OFFICE VISIT (OUTPATIENT)
Dept: CARDIOLOGY CLINIC | Age: 69
End: 2019-05-08

## 2019-05-08 VITALS
HEIGHT: 65 IN | BODY MASS INDEX: 40.32 KG/M2 | WEIGHT: 242 LBS | HEART RATE: 88 BPM | OXYGEN SATURATION: 95 % | SYSTOLIC BLOOD PRESSURE: 104 MMHG | DIASTOLIC BLOOD PRESSURE: 60 MMHG | RESPIRATION RATE: 16 BRPM

## 2019-05-08 DIAGNOSIS — I10 ESSENTIAL HYPERTENSION: Chronic | ICD-10-CM

## 2019-05-08 DIAGNOSIS — I25.10 CORONARY ARTERY DISEASE INVOLVING NATIVE CORONARY ARTERY OF NATIVE HEART WITHOUT ANGINA PECTORIS: Primary | ICD-10-CM

## 2019-05-08 NOTE — PROGRESS NOTES
Raul Tolliver MD. [facility]              Patient: Hammad Santana  : 1950      Today's Date: 2019            HISTORY OF PRESENT ILLNESS:     History of Present Illness:  Here for follow-up. No complaints. No CP or SOB. Working. No dizziness. Feels well. PAST MEDICAL HISTORY:     Past Medical History:   Diagnosis Date    CAD (coronary artery disease)     NSTEMI 3/21/15; CABG 3/24/15;  LVEF 55%    Diabetes ([facility]     Dyslipidemia     FH ischemic heart disease     HTN (hypertension)     Hypertension     NSTEMI (non-ST elevated myocardial infarction) ([facility]     NSTEMI 3/21/15    Obesity     MING (obstructive sleep apnea)     surgery in past    MING (obstructive sleep apnea)     on CPAP    Paronychia of great toe of right foot     S/P CABG x 4     CABG 3/24/15 - LIMA TO LAD, SEQUENTIAL VEIN GRAFT TO OM1, OM2; SVG TO DISTAL RCA       Past Surgical History:   Procedure Laterality Date    HX APPENDECTOMY      HX HEENT      eyes           MEDICATIONS:     Current Outpatient Medications   Medication Sig Dispense Refill    eplerenone (INSPRA) 25 mg tablet Take 1 Tab by mouth daily. 90 Tab 1    carvedilol (COREG) 25 mg tablet Take 1.5 Tabs by mouth two (2) times daily (with meals). 270 Tab 3    dilTIAZem CD (CARDIZEM CD) 300 mg ER capsule Take 1 Cap by mouth daily. 90 Cap 3    insulin glargine 300 unit/mL (1.5 mL) inpn 220 Units by SubCUTAneous route nightly. Indications: toujeo      Liraglutide (VICTOZA) 0.6 mg/0.1 mL (18 mg/3 mL) pnij 1.8 mg by SubCUTAneous route.  lisinopril-hydroCHLOROthiazide (PRINZIDE, ZESTORETIC) 20-12.5 mg per tablet Take 2 Tabs by mouth daily. 180 Tab 5    omeprazole (PRILOSEC) 20 mg capsule Take 20 mg by mouth daily.  Azelastine (ASTEPRO) 0.15 % (205.5 mcg) nasal spray 1 Spray daily.  cpap machine kit by Does Not Apply route.  montelukast (SINGULAIR) 10 mg tablet Take 10 mg by mouth daily.       rosuvastatin (CRESTOR) 20 mg tablet Take 1 Tab by mouth nightly. 90 Tab 3    dapagliflozin (FARXIGA) 10 mg tab Take  by mouth daily.  naproxen sodium (ALEVE) 220 mg tablet Take 220 mg by mouth once as needed for Pain.  cetirizine (ZYRTEC) 10 mg tablet Take 10 mg by mouth nightly.  aspirin delayed-release 81 mg tablet Take 81 mg by mouth daily.  linagliptin (TRADJENTA) 5 mg tablet Take 5 mg by mouth nightly. Allergies   Allergen Reactions    Amoxicillin Hives             SOCIAL HISTORY:     Social History     Tobacco Use    Smoking status: Never Smoker    Smokeless tobacco: Never Used   Substance Use Topics    Alcohol use: Yes     Alcohol/week: 0.0 oz     Comment: rarely    Drug use: No         FAMILY HISTORY:     Family History   Problem Relation Age of Onset    Coronary Artery Disease Mother     Coronary Artery Disease Father             REVIEW OF SYSTEMS:         Review of Systems:  Constitutional: Negative for fever, chills  HEENT: Negative for vision changes.    Respiratory: Negative for cough  Cardiovascular: Negative for orthopnea, syncope, and PND.    Gastrointestinal: Negative for abdominal pain, diarrhea, or melena  Genitourinary: Negative for dysuria  Musculoskeletal: Negative for myalgias.    Skin: Negative for rash  Heme: No problems bleeding. Neurological: Negative for speech change and focal weakness.                       PHYSICAL EXAM:        Physical Exam:  Visit Vitals  /60 (BP 1 Location: Left arm, BP Patient Position: Sitting)   Pulse 88   Resp 16   Ht 5' 5\" (1.651 m)   Wt 242 lb (109.8 kg)   SpO2 95%   BMI 40.27 kg/m²       Patient appears generally well, mood and affect are appropriate and pleasant. + obese    HEENT:  Hearing intact, non-icteric, normocephalic, atraumatic.    Neck Exam: Supple, No JVD  Lung Exam: Clear to auscultation, even breath sounds.    Cardiac Exam: Regular rate and rhythm with no murmur  Abdomen: Soft, non-tender,  + obese   Extremities: Moves all ext well.  No lower extremity edema. Psych: Appropriate affect  Neuro - Grossly intact              LABS / OTHER STUDIES:                  Labs 10/23/15 - A1c 8.0, chol 122, , HDL 26, LDL 51, non-HDL 96, LFT's OK, BMP OK  Labs 9/16 - chol 121, , HDL 25, LDL 48, LFT's OK. Labs 1/5/18 - BMP OK, A1c 8.8, chol 122, . HDL 24, LDL 40, LFT's OK, Panchito 6.6, renin not performed, CBC OK, TSH 2.2          CARDIAC DIAGNOSTICS:         Cardiac Evaluation Includes:  ECHO: 3/21/2015: EF 55-60%, no WMA, mild LVH, MAC, no MR    CATH: 3/23/2015: Obstructive 3VD:dLM:80%, mLAD:80%, dLAD:70%, D1:90% mLCX: 90%, mRCA:50%, PDA: 80%, EF 55%, Focal mid inferior AK. Intra-op WILEY 3/24/15 - postop LVEF 55%, mild MR  CABG 3/24/15 - LIMA TO LAD, SEQUENTIAL VEIN GRAFT TO OM1, OM2; SVG TO DISTAL RCA      EKG 3/21/15 - NSR, diffuse ST depression   EKG 5/16/16 - NSR, NSST changes   EKG 5/16/16 - NSR, NSST changes, PVC's   EKG 5/8/19 - NSR, non-specific T wave abn         ASSESSMENT AND PLAN:         Assessment and Plan:  1) CAD  - NSTEMI 3/21/15; CABG 3/24/15; LVEF 55%  - Mr. Santana is doing well s/p CABG - no anginal complaints   - Continue BB, statin, ACE-I, and ASA  - I encouraged he exercise and eat healthy       2) HTN    - on multiple meds his BP looks good   - BP goal < 130/80   - On 5/8/19 BP seems a little low --- Asked him to follow BP at home - If BP runs low, then can cut back meds      3) Dyslipidemia    - continue Crestor - prior LDL OK.     - Lipids per PCP       4) Diabetes Mellitus  - working with PCP       5) See me in 12 months.   Patient expressed understanding of the plan - questions were answered.      He works maintenance for [employer]. Has 2 young grand kids. Jose Messina MD, [address]  [address] [place], [address] [facility] [address]                [id number] [address] [facility] [address]  [place], [place] [address]                                     COL[place], [address] [place] [address]  Ph: [phone]                                                              [phone]

## 2019-05-08 NOTE — PROGRESS NOTES
Chief Complaint   Patient presents with    Follow-up    Hypertension    Coronary Artery Disease    Cholesterol Problem     Visit Vitals  /60 (BP 1 Location: Left arm, BP Patient Position: Sitting)   Pulse 88   Resp 16   Ht 5' 5\" (1.651 m)   Wt 242 lb (109.8 kg)   SpO2 95%   BMI 40.27 kg/m²       Fall Risk Assessment, last 12 mths 5/8/2019   Able to walk? Yes   Fall in past 12 months?  No

## 2019-05-30 RX ORDER — CARVEDILOL 25 MG/1
TABLET ORAL
Qty: 270 TAB | Refills: 3 | Status: SHIPPED | OUTPATIENT
Start: 2019-05-30 | End: 2020-05-19

## 2019-05-30 NOTE — TELEPHONE ENCOUNTER
Refill for coreg 25 mg 1 1/2 tab BID per Dr. Kalina Helms.    [address] [place] [address] 5/8/19  NOV 5/6/20

## 2019-06-02 DIAGNOSIS — Z79.4 TYPE 2 DIABETES MELLITUS WITHOUT COMPLICATION, WITH LONG-TERM CURRENT USE OF INSULIN (HCC): Chronic | ICD-10-CM

## 2019-06-02 DIAGNOSIS — I10 ESSENTIAL HYPERTENSION: Chronic | ICD-10-CM

## 2019-06-02 DIAGNOSIS — E11.9 TYPE 2 DIABETES MELLITUS WITHOUT COMPLICATION, WITH LONG-TERM CURRENT USE OF INSULIN (HCC): Chronic | ICD-10-CM

## 2019-06-03 RX ORDER — LISINOPRIL AND HYDROCHLOROTHIAZIDE 12.5; 2 MG/1; MG/1
TABLET ORAL
Qty: 180 TAB | Refills: 5 | Status: ON HOLD | OUTPATIENT
Start: 2019-06-03 | End: 2019-07-04

## 2019-06-03 NOTE — TELEPHONE ENCOUNTER
Refill for lisinopril-HCTZ 20-12.5 mg 2 tab dialy per Dr. Deandra Epstein.    Tony Avila 5/8/19  NOV 5/6/20

## 2019-07-04 ENCOUNTER — APPOINTMENT (OUTPATIENT)
Dept: GENERAL RADIOLOGY | Age: 69
DRG: 623 | End: 2019-07-04
Attending: NURSE PRACTITIONER
Payer: COMMERCIAL

## 2019-07-04 ENCOUNTER — HOSPITAL ENCOUNTER (INPATIENT)
Age: 69
LOS: 4 days | Discharge: HOME | DRG: 623 | End: 2019-07-08
Attending: EMERGENCY MEDICINE | Admitting: INTERNAL MEDICINE
Payer: COMMERCIAL

## 2019-07-04 DIAGNOSIS — N17.9 AKI (ACUTE KIDNEY INJURY) (HCC): ICD-10-CM

## 2019-07-04 DIAGNOSIS — R65.10 SIRS (SYSTEMIC INFLAMMATORY RESPONSE SYNDROME) (HCC): ICD-10-CM

## 2019-07-04 DIAGNOSIS — L97.429 DIABETIC ULCER OF LEFT MIDFOOT ASSOCIATED WITH TYPE 2 DIABETES MELLITUS, UNSPECIFIED ULCER STAGE (HCC): Primary | ICD-10-CM

## 2019-07-04 DIAGNOSIS — L03.116 CELLULITIS OF LEFT LOWER EXTREMITY: ICD-10-CM

## 2019-07-04 DIAGNOSIS — E11.621 DIABETIC ULCER OF LEFT MIDFOOT ASSOCIATED WITH TYPE 2 DIABETES MELLITUS, UNSPECIFIED ULCER STAGE (HCC): Primary | ICD-10-CM

## 2019-07-04 PROBLEM — E11.42 DM TYPE 2 WITH DIABETIC PERIPHERAL NEUROPATHY (HCC): Chronic | Status: ACTIVE | Noted: 2019-07-04

## 2019-07-04 PROBLEM — L08.9 DIABETIC FOOT INFECTION (HCC): Status: ACTIVE | Noted: 2019-07-04

## 2019-07-04 PROBLEM — E11.628 DIABETIC FOOT INFECTION (HCC): Status: ACTIVE | Noted: 2019-07-04

## 2019-07-04 PROBLEM — R79.89 ELEVATED SERUM CREATININE: Status: ACTIVE | Noted: 2019-07-04

## 2019-07-04 LAB
ALBUMIN SERPL-MCNC: 3.9 G/DL (ref 3.5–5)
ALBUMIN/GLOB SERPL: 1 {RATIO} (ref 1.1–2.2)
ALP SERPL-CCNC: 77 U/L (ref 45–117)
ALT SERPL-CCNC: 46 U/L (ref 12–78)
ANION GAP SERPL CALC-SCNC: 5 MMOL/L (ref 5–15)
APPEARANCE UR: CLEAR
AST SERPL-CCNC: 43 U/L (ref 15–37)
BACTERIA URNS QL MICRO: NEGATIVE /HPF
BASOPHILS # BLD: 0.1 K/UL (ref 0–0.1)
BASOPHILS NFR BLD: 0 % (ref 0–1)
BILIRUB SERPL-MCNC: 0.9 MG/DL (ref 0.2–1)
BILIRUB UR QL: NEGATIVE
BUN SERPL-MCNC: 26 MG/DL (ref 6–20)
BUN/CREAT SERPL: 15 (ref 12–20)
CALCIUM SERPL-MCNC: 8.9 MG/DL (ref 8.5–10.1)
CHLORIDE SERPL-SCNC: 101 MMOL/L (ref 97–108)
CO2 SERPL-SCNC: 30 MMOL/L (ref 21–32)
COLOR UR: ABNORMAL
COMMENT, HOLDF: NORMAL
CREAT SERPL-MCNC: 1.7 MG/DL (ref 0.7–1.3)
DIFFERENTIAL METHOD BLD: ABNORMAL
EOSINOPHIL # BLD: 0.1 K/UL (ref 0–0.4)
EOSINOPHIL NFR BLD: 1 % (ref 0–7)
EPITH CASTS URNS QL MICRO: ABNORMAL /LPF
ERYTHROCYTE [DISTWIDTH] IN BLOOD BY AUTOMATED COUNT: 13.8 % (ref 11.5–14.5)
EST. AVERAGE GLUCOSE BLD GHB EST-MCNC: 197 MG/DL
GLOBULIN SER CALC-MCNC: 3.9 G/DL (ref 2–4)
GLUCOSE BLD STRIP.AUTO-MCNC: 124 MG/DL (ref 65–100)
GLUCOSE BLD STRIP.AUTO-MCNC: 81 MG/DL (ref 65–100)
GLUCOSE SERPL-MCNC: 100 MG/DL (ref 65–100)
GLUCOSE UR STRIP.AUTO-MCNC: >1000 MG/DL
HBA1C MFR BLD: 8.5 % (ref 4.2–6.3)
HCT VFR BLD AUTO: 42.1 % (ref 36.6–50.3)
HGB BLD-MCNC: 13.9 G/DL (ref 12.1–17)
HGB UR QL STRIP: NEGATIVE
HYALINE CASTS URNS QL MICRO: ABNORMAL /LPF (ref 0–5)
IMM GRANULOCYTES # BLD AUTO: 0.1 K/UL (ref 0–0.04)
IMM GRANULOCYTES NFR BLD AUTO: 1 % (ref 0–0.5)
KETONES UR QL STRIP.AUTO: NEGATIVE MG/DL
LACTATE BLD-SCNC: 1.09 MMOL/L (ref 0.4–2)
LEUKOCYTE ESTERASE UR QL STRIP.AUTO: NEGATIVE
LYMPHOCYTES # BLD: 1.3 K/UL (ref 0.8–3.5)
LYMPHOCYTES NFR BLD: 10 % (ref 12–49)
MCH RBC QN AUTO: 29.1 PG (ref 26–34)
MCHC RBC AUTO-ENTMCNC: 33 G/DL (ref 30–36.5)
MCV RBC AUTO: 88.3 FL (ref 80–99)
MONOCYTES # BLD: 1.4 K/UL (ref 0–1)
MONOCYTES NFR BLD: 10 % (ref 5–13)
NEUTS SEG # BLD: 10.4 K/UL (ref 1.8–8)
NEUTS SEG NFR BLD: 78 % (ref 32–75)
NITRITE UR QL STRIP.AUTO: NEGATIVE
NRBC # BLD: 0 K/UL (ref 0–0.01)
NRBC BLD-RTO: 0 PER 100 WBC
PH UR STRIP: 5.5 [PH] (ref 5–8)
PLATELET # BLD AUTO: 179 K/UL (ref 150–400)
PMV BLD AUTO: 10.9 FL (ref 8.9–12.9)
POTASSIUM SERPL-SCNC: 5 MMOL/L (ref 3.5–5.1)
PROCALCITONIN SERPL-MCNC: 0.1 NG/ML
PROT SERPL-MCNC: 7.8 G/DL (ref 6.4–8.2)
PROT UR STRIP-MCNC: NEGATIVE MG/DL
RBC # BLD AUTO: 4.77 M/UL (ref 4.1–5.7)
RBC #/AREA URNS HPF: ABNORMAL /HPF (ref 0–5)
SAMPLES BEING HELD,HOLD: NORMAL
SERVICE CMNT-IMP: ABNORMAL
SERVICE CMNT-IMP: NORMAL
SODIUM SERPL-SCNC: 136 MMOL/L (ref 136–145)
SP GR UR REFRACTOMETRY: 1.02 (ref 1–1.03)
UA: UC IF INDICATED,UAUC: ABNORMAL
UROBILINOGEN UR QL STRIP.AUTO: 1 EU/DL (ref 0.2–1)
WBC # BLD AUTO: 13.4 K/UL (ref 4.1–11.1)
WBC URNS QL MICRO: ABNORMAL /HPF (ref 0–4)

## 2019-07-04 PROCEDURE — 80053 COMPREHEN METABOLIC PANEL: CPT

## 2019-07-04 PROCEDURE — 96365 THER/PROPH/DIAG IV INF INIT: CPT

## 2019-07-04 PROCEDURE — 81001 URINALYSIS AUTO W/SCOPE: CPT

## 2019-07-04 PROCEDURE — 36415 COLL VENOUS BLD VENIPUNCTURE: CPT

## 2019-07-04 PROCEDURE — 74011250637 HC RX REV CODE- 250/637: Performed by: INTERNAL MEDICINE

## 2019-07-04 PROCEDURE — 65270000029 HC RM PRIVATE

## 2019-07-04 PROCEDURE — 87040 BLOOD CULTURE FOR BACTERIA: CPT

## 2019-07-04 PROCEDURE — 74011250636 HC RX REV CODE- 250/636: Performed by: INTERNAL MEDICINE

## 2019-07-04 PROCEDURE — 83036 HEMOGLOBIN GLYCOSYLATED A1C: CPT

## 2019-07-04 PROCEDURE — 74011250636 HC RX REV CODE- 250/636: Performed by: NURSE PRACTITIONER

## 2019-07-04 PROCEDURE — 73630 X-RAY EXAM OF FOOT: CPT

## 2019-07-04 PROCEDURE — 84145 PROCALCITONIN (PCT): CPT

## 2019-07-04 PROCEDURE — 71045 X-RAY EXAM CHEST 1 VIEW: CPT

## 2019-07-04 PROCEDURE — 74011000258 HC RX REV CODE- 258: Performed by: INTERNAL MEDICINE

## 2019-07-04 PROCEDURE — 85025 COMPLETE CBC W/AUTO DIFF WBC: CPT

## 2019-07-04 PROCEDURE — 82962 GLUCOSE BLOOD TEST: CPT

## 2019-07-04 PROCEDURE — 83605 ASSAY OF LACTIC ACID: CPT

## 2019-07-04 PROCEDURE — 99283 EMERGENCY DEPT VISIT LOW MDM: CPT

## 2019-07-04 PROCEDURE — 77030018846 HC SOL IRR STRL H20 ICUM -A

## 2019-07-04 RX ORDER — PROCHLORPERAZINE EDISYLATE 5 MG/ML
10 INJECTION INTRAMUSCULAR; INTRAVENOUS
Status: DISCONTINUED | OUTPATIENT
Start: 2019-07-04 | End: 2019-07-08 | Stop reason: HOSPADM

## 2019-07-04 RX ORDER — SODIUM CHLORIDE 9 MG/ML
100 INJECTION, SOLUTION INTRAVENOUS CONTINUOUS
Status: DISCONTINUED | OUTPATIENT
Start: 2019-07-04 | End: 2019-07-08 | Stop reason: HOSPADM

## 2019-07-04 RX ORDER — ACETAMINOPHEN 325 MG/1
650 TABLET ORAL
Status: DISCONTINUED | OUTPATIENT
Start: 2019-07-04 | End: 2019-07-08 | Stop reason: HOSPADM

## 2019-07-04 RX ORDER — MAGNESIUM SULFATE 100 %
4 CRYSTALS MISCELLANEOUS AS NEEDED
Status: DISCONTINUED | OUTPATIENT
Start: 2019-07-04 | End: 2019-07-08 | Stop reason: HOSPADM

## 2019-07-04 RX ORDER — SODIUM CHLORIDE 0.9 % (FLUSH) 0.9 %
5-40 SYRINGE (ML) INJECTION EVERY 8 HOURS
Status: DISCONTINUED | OUTPATIENT
Start: 2019-07-04 | End: 2019-07-06

## 2019-07-04 RX ORDER — INSULIN LISPRO 100 [IU]/ML
INJECTION, SOLUTION INTRAVENOUS; SUBCUTANEOUS
Status: DISCONTINUED | OUTPATIENT
Start: 2019-07-04 | End: 2019-07-08 | Stop reason: HOSPADM

## 2019-07-04 RX ORDER — ACETAMINOPHEN 500 MG
1000 TABLET ORAL
COMMUNITY

## 2019-07-04 RX ORDER — HEPARIN SODIUM 5000 [USP'U]/ML
5000 INJECTION, SOLUTION INTRAVENOUS; SUBCUTANEOUS EVERY 8 HOURS
Status: DISCONTINUED | OUTPATIENT
Start: 2019-07-04 | End: 2019-07-05

## 2019-07-04 RX ORDER — LANOLIN ALCOHOL/MO/W.PET/CERES
3 CREAM (GRAM) TOPICAL
Status: DISCONTINUED | OUTPATIENT
Start: 2019-07-04 | End: 2019-07-08 | Stop reason: HOSPADM

## 2019-07-04 RX ORDER — OXYCODONE AND ACETAMINOPHEN 5; 325 MG/1; MG/1
1 TABLET ORAL
Status: DISCONTINUED | OUTPATIENT
Start: 2019-07-04 | End: 2019-07-08 | Stop reason: HOSPADM

## 2019-07-04 RX ORDER — LISINOPRIL AND HYDROCHLOROTHIAZIDE 12.5; 2 MG/1; MG/1
2 TABLET ORAL DAILY
COMMUNITY
End: 2021-08-16 | Stop reason: SDUPTHER

## 2019-07-04 RX ORDER — DEXTROSE 50 % IN WATER (D50W) INTRAVENOUS SYRINGE
25-50 AS NEEDED
Status: DISCONTINUED | OUTPATIENT
Start: 2019-07-04 | End: 2019-07-08 | Stop reason: HOSPADM

## 2019-07-04 RX ORDER — SODIUM CHLORIDE 0.9 % (FLUSH) 0.9 %
5-40 SYRINGE (ML) INJECTION AS NEEDED
Status: DISCONTINUED | OUTPATIENT
Start: 2019-07-04 | End: 2019-07-08 | Stop reason: HOSPADM

## 2019-07-04 RX ORDER — SODIUM CHLORIDE 0.9 % (FLUSH) 0.9 %
5-10 SYRINGE (ML) INJECTION AS NEEDED
Status: DISCONTINUED | OUTPATIENT
Start: 2019-07-04 | End: 2019-07-08 | Stop reason: HOSPADM

## 2019-07-04 RX ORDER — DEXTROSE MONOHYDRATE 100 MG/ML
125-250 INJECTION, SOLUTION INTRAVENOUS AS NEEDED
Status: DISCONTINUED | OUTPATIENT
Start: 2019-07-04 | End: 2019-07-04 | Stop reason: SDUPTHER

## 2019-07-04 RX ORDER — ROSUVASTATIN CALCIUM 10 MG/1
10 TABLET, COATED ORAL
COMMUNITY
End: 2020-07-02 | Stop reason: SDUPTHER

## 2019-07-04 RX ORDER — CLINDAMYCIN PHOSPHATE 600 MG/50ML
600 INJECTION, SOLUTION INTRAVENOUS
Status: COMPLETED | OUTPATIENT
Start: 2019-07-04 | End: 2019-07-04

## 2019-07-04 RX ORDER — HYDROMORPHONE HYDROCHLORIDE 1 MG/ML
0.5 INJECTION, SOLUTION INTRAMUSCULAR; INTRAVENOUS; SUBCUTANEOUS
Status: DISCONTINUED | OUTPATIENT
Start: 2019-07-04 | End: 2019-07-08 | Stop reason: HOSPADM

## 2019-07-04 RX ADMIN — VANCOMYCIN HYDROCHLORIDE 2500 MG: 10 INJECTION, POWDER, LYOPHILIZED, FOR SOLUTION INTRAVENOUS at 17:30

## 2019-07-04 RX ADMIN — ACETAMINOPHEN 650 MG: 325 TABLET ORAL at 23:27

## 2019-07-04 RX ADMIN — Medication 10 ML: at 21:44

## 2019-07-04 RX ADMIN — Medication 10 ML: at 18:00

## 2019-07-04 RX ADMIN — HEPARIN SODIUM 5000 UNITS: 5000 INJECTION INTRAVENOUS; SUBCUTANEOUS at 21:44

## 2019-07-04 RX ADMIN — SODIUM CHLORIDE 100 ML/HR: 900 INJECTION, SOLUTION INTRAVENOUS at 18:00

## 2019-07-04 RX ADMIN — CLINDAMYCIN PHOSPHATE 600 MG: 600 INJECTION, SOLUTION INTRAVENOUS at 16:47

## 2019-07-04 RX ADMIN — MEROPENEM 500 MG: 500 INJECTION, POWDER, FOR SOLUTION INTRAVENOUS at 20:25

## 2019-07-04 NOTE — PROGRESS NOTES
[facility] Dosing Services: Antimicrobial Stewardship Progress Note    Consult for antibiotic dosing of Vancomycin by BRADLEY Gill NP  Pharmacist reviewed antibiotic appropriateness for 80year old , male  for indication of skin and soft tissue infection; SA vs MRSA for diabetic foot ulcer  Day of Therapy: 1    Plan:  Vancomycin therapy:  Start Vancomycin therapy, with loading dose of 2,500 mg IV at 1700 on 7/4/2019. Follow with maintenance dose of 1,750 mg IV at 1700 on 7/5/2019, every 24 hours. Dose calculated to approximate a therapeutic trough of 15-20 mcg/mL. Pharmacist will follow daily and will make changes to dose and/or frequency based on clinical status. Serum Creatinine     Lab Results   Component Value Date/Time    Creatinine 1.70 (H) 07/04/2019 03:09 PM    Creatinine (POC) 1.0 03/21/2015 09:39 AM         Creatinine Clearance Estimated Creatinine Clearance: 48.2 mL/min (A) (based on SCr of 1.7 mg/dL (H)).      Temp   98.5 °F (36.9 °C)    WBC   Lab Results   Component Value Date/Time    WBC 13.4 (H) 07/04/2019 03:09 PM         H/H   Lab Results   Component Value Date/Time    HGB 13.9 07/04/2019 03:09 PM          Platelets   Lab Results   Component Value Date/Time    PLATELET 377 99/19/6844 03:09 PM        Pharmacist: Melvin Portillo

## 2019-07-04 NOTE — PROGRESS NOTES
[facility]: MED RECONCILIATION    Comments/Recommendations:   Reviewed PTA medications with patient and family at bedside  Question noncompliance with diltiazem- Patient filled #90 caps on 2/11/19 however states that he takes daily. Patient's family will bring in his Janes Back as these are not stocked by the hospital pharmacy    Medications added:     APAP as needed    Medications removed:    Naproxen    Medications adjusted:    Victoza- takes at bedtime w/ toujeo  Rosuvastatin- adjusted to 10 mg    Information obtained from: Patient at bedside, RxQuery    Allergies: Amoxicillin    Prior to Admission Medications:     Medication Documentation Review Audit       Reviewed by Sangita Tran, ARVIND (Pharmacist) on 07/04/19 at 1917      Medication Sig Documenting Provider Last Dose Status Taking?   acetaminophen (TYLENOL) 500 mg tablet Take 1,000 mg by mouth every six (6) hours as needed for Pain. Provider, Historical 6/27/2019 Active Yes   aspirin delayed-release 81 mg tablet Take 81 mg by mouth daily. Provider, Historical 7/4/2019 Active Yes   Azelastine (ASTEPRO) 0.15 % (205.5 mcg) nasal spray 1 Spray daily. Provider, Historical 7/4/2019 Active Yes   carvedilol (COREG) 25 mg tablet TAKE 1 & 1/2 (ONE & ONE-HALF) TABLETS BY MOUTH TWICE DAILY WITH MEALS Patrizia Melendez MD 7/4/2019 Active Yes   cetirizine (ZYRTEC) 10 mg tablet Take 10 mg by mouth daily as needed for Allergies. Provider, Historical 6/27/2019 Active Yes   dapagliflozin (FARXIGA) 10 mg tab Take 10 mg by mouth daily. Provider, Historical 7/4/2019 Active Yes   dilTIAZem CD (CARDIZEM CD) 300 mg ER capsule Take 1 Cap by mouth daily. Patrizia Melendez MD 7/4/2019 Active Yes   eplerenone (INSPRA) 25 mg tablet Take 1 Tab by mouth daily. Patrizia Melendez MD 7/4/2019 Active Yes   insulin glargine U-300 conc (TOUJEO MAX U-300 SOLOSTAR) 300 unit/mL (3 mL) inpn 220 Units by SubCUTAneous route nightly.  Provider, Historical 7/3/2019 Active Yes   linagliptin (TRADJENTA) 5 mg tablet Take 5 mg by mouth nightly. Provider, Historical 7/3/2019 Active Yes   Liraglutide (VICTOZA) 0.6 mg/0.1 mL (18 mg/3 mL) pnij 1.8 mg by SubCUTAneous route nightly. Provider, Historical 7/3/2019 Active Yes   lisinopril-hydroCHLOROthiazide (PRINZIDE, ZESTORETIC) 20-12.5 mg per tablet Take 2 Tabs by mouth daily. Provider, Historical 7/4/2019 Active Yes   montelukast (SINGULAIR) 10 mg tablet Take 10 mg by mouth every evening. Provider, Historical 7/3/2019 Active Yes   omeprazole (PRILOSEC) 20 mg capsule Take 20 mg by mouth daily. Provider, Historical 7/4/2019 Active Yes   rosuvastatin (CRESTOR) 10 mg tablet Take 10 mg by mouth nightly.  Provider, Historical 7/3/2019 Active Yes                  Oniel Hutton, PHARMD   Contact: [phone]

## 2019-07-04 NOTE — ED NOTES
TRANSFER - OUT REPORT:    Verbal report given to Jazlyn(name) on Nain Santana  being transferred to 5th floor(unit) for routine progression of care       Report consisted of patients Situation, Background, Assessment and   Recommendations(SBAR). Information from the following report(s) SBAR, ED Summary, MAR, Recent Results and Cardiac Rhythm sinus tach was reviewed with the receiving nurse. Lines:   Peripheral IV 07/04/19 Left Antecubital (Active)   Site Assessment Clean, dry, & intact 7/4/2019  3:09 PM   Phlebitis Assessment 0 7/4/2019  3:09 PM   Infiltration Assessment 0 7/4/2019  3:09 PM   Dressing Status Clean, dry, & intact 7/4/2019  3:09 PM   Dressing Type Tape;Transparent 7/4/2019  3:09 PM   Hub Color/Line Status Pink;Flushed;Patent 7/4/2019  3:09 PM   Action Taken Blood drawn 7/4/2019  3:09 PM   Alcohol Cap Used Yes 7/4/2019  3:09 PM        Opportunity for questions and clarification was provided.       Patient transported with:   [employer]

## 2019-07-04 NOTE — H&P
[place] [address]  [phone]    Admission History and Physical      NAME:  Yanet Santana   :   1950   MRN:  [id number]     PCP:  Linda Singh MD     Date/Time:  2019         Subjective:     CHIEF COMPLAINT: swollen foot     HISTORY OF PRESENT ILLNESS:     Mr. aPntera Gonzalez is a 80 y.o.  male who is admitted with diabetic foot infection. Mr. Pantera Gonzalez presented to the Emergency Department this PM complaining of swollen foot: left side, constant, for the past day or so, associated with redness and fever; has had foot sores for the past 4+ weeks and has been self-treating at home without improvement    History obtained from chart review and the patient. Previous records reviewed: admit here in  with NSTEMI that led to transfer for [facility] and eventual CABG for CAD, outpatient follow up with Cardiology    Past Medical History:   Diagnosis Date    CAD (coronary artery disease)     NSTEMI 3/21/15; CABG 3/24/15;  LVEF 55%    Diabetes ([address]     Dyslipidemia     FH ischemic heart disease     HTN (hypertension)     Hypertension     NSTEMI (non-ST elevated myocardial infarction) ([address]     NSTEMI 3/21/15    Obesity     MING (obstructive sleep apnea)     surgery in past    MING (obstructive sleep apnea)     on CPAP    Paronychia of great toe of right foot     S/P CABG x 4     CABG 3/24/15 - LIMA TO LAD, SEQUENTIAL VEIN GRAFT TO OM1, OM2; SVG TO DISTAL RCA        Past Surgical History:   Procedure Laterality Date    HX APPENDECTOMY      HX HEENT      eyes       Social History     Tobacco Use    Smoking status: Never Smoker    Smokeless tobacco: Never Used   Substance Use Topics    Alcohol use:  Yes     Alcohol/week: 0.0 oz     Comment: rarely        Family History   Problem Relation Age of Onset    Coronary Artery Disease Mother     Coronary Artery Disease Father         Allergies   Allergen Reactions    Amoxicillin Hives        Prior to Admission medications    Medication Sig Start Date End Date Taking? Authorizing Provider   lisinopril-hydroCHLOROthiazide (PRINZIDE, ZESTORETIC) 20-12.5 mg per tablet TAKE 2 TABLETS BY MOUTH ONCE DAILY 6/3/19   Keith Greardo MD   carvedilol (COREG) 25 mg tablet TAKE 1 & 1/2 (ONE & ONE-HALF) TABLETS BY MOUTH TWICE DAILY WITH MEALS 5/30/19   Keith Gerardo MD   eplerenone (INSPRA) 25 mg tablet Take 1 Tab by mouth daily. 4/17/19   Keith Gerardo MD   dilTIAZem CD (CARDIZEM CD) 300 mg ER capsule Take 1 Cap by mouth daily. 5/2/18   Keith Gerardo MD   insulin glargine 300 unit/mL (1.5 mL) inpn 220 Units by SubCUTAneous route nightly. Indications: toujeo    Provider, Historical   Liraglutide (VICTOZA) 0.6 mg/0.1 mL (18 mg/3 mL) pnij 1.8 mg by SubCUTAneous route. Provider, Historical   omeprazole (PRILOSEC) 20 mg capsule Take 20 mg by mouth daily. Provider, Historical   Azelastine (ASTEPRO) 0.15 % (205.5 mcg) nasal spray 1 Spray daily. Provider, Historical   cpap machine kit by Does Not Apply route. Provider, Historical   montelukast (SINGULAIR) 10 mg tablet Take 10 mg by mouth daily. Provider, Historical   rosuvastatin (CRESTOR) 20 mg tablet Take 1 Tab by mouth nightly. 11/16/15   Keith Gerardo MD   dapagliflozin [facility]) 10 mg tab Take  by mouth daily. Provider, Historical   naproxen sodium (ALEVE) 220 mg tablet Take 220 mg by mouth once as needed for Pain. Provider, Historical   cetirizine (ZYRTEC) 10 mg tablet Take 10 mg by mouth nightly. Provider, Historical   aspirin delayed-release 81 mg tablet Take 81 mg by mouth daily. Provider, Historical   linagliptin (TRADJENTA) 5 mg tablet Take 5 mg by mouth nightly.     Provider, Historical         Review of Systems:  (bold if positive, if negative)    Gen:  fever, chills, fatigueEyes:  ENT:  CVS:  Pulm:  GI:    :    MS:  Skin:  erythemawoundPsych:  Endo:    Hem:  Renal:    Neuro:            Objective:      VITALS:    Vital signs reviewed; most recent are:    Visit Vitals  /57   Pulse (!) 102   Temp 98.5 °F (36.9 °C)   Resp 17   Ht 5' 5\" (1.651 m)   Wt 109.8 kg (242 lb)   SpO2 94%   BMI 40.27 kg/m²     SpO2 Readings from Last 6 Encounters:   07/04/19 94%   05/08/19 95%   05/02/18 95%   03/07/18 94%   01/03/18 94%   12/07/16 95%        No intake or output data in the 24 hours ending 07/04/19 1740         Exam:     Physical Exam:    Gen: Well-developed, morbidly obese, in no acute distress  HEENT:  Pink conjunctivae, PERRL, hearing intact to voice, moist mucous membranes  Neck: Supple, without masses, thyroid non-tender  Resp: No accessory muscle use, clear breath sounds without wheezes rales or rhonchi  Card: No murmurs, normal S1, S2 without thrills, bruits or peripheral edema, 2+ LE peripheral pulses  Abd:  Soft, non-tender, non-distended, normoactive bowel sounds are present, no palpable organomegaly and no detectable hernias  Lymph:  No cervical or inguinal adenopathy  Musc: No cyanosis or clubbing  Skin:           Neuro:  Cranial nerves are grossly intact, no focal motor weakness, follows commands appropriately  Psych:  Good insight, oriented to person, place and time, alert             Labs:    Recent Labs     07/04/19  1509   WBC 13.4*   HGB 13.9   HCT 42.1        Recent Labs     07/04/19  1509      K 5.0      CO2 30      BUN 26*   CREA 1.70*   CA 8.9   ALB 3.9   TBILI 0.9   SGOT 43*   ALT 46     Lab Results   Component Value Date/Time    Glucose (POC) 163 (H) 03/28/2015 06:43 AM    Glucose (POC) 131 (H) 03/27/2015 09:46 PM     No results for input(s): PH, PCO2, PO2, HCO3, FIO2 in the last 72 hours. No results for input(s): INR in the last 72 hours. No lab exists for component: INREXT, INREXT    Additional testing:  Plain films - left foot - with soft tissue swelling without evidence of osteomyelitis, visualized by me.         Assessment/Plan:       Principal Problem:    Diabetic foot infection ([facility] (7/4/2019)   - continue vancomycin started in ED    - change clindamycin to Merrem for better coverage of Gram-negatives and Pseudomonas likely to be present in a diabetic foot infection   - send procalcitonin, patient does not appear septic at this time   - consult Podiatry in AM   - may need further imaging but will hold off on MRI for now given renal function, see below    Active Problems:    CAD (coronary artery disease) (3/23/2015)   - stable, continue cardiac meds      Elevated serum creatinine (7/4/2019)   - baseline last year was ~1 so this appears to be acutely elevated, possible ARF, unclear why   - UA not ordered in ED, will send now   - hydrate and recheck in AM   - hold off on any radiologic contrast for now      HTN (hypertension) (3/23/2015)   - BP okay, follow on home meds except ACE and diuretic in light of elevated creatinine      MING (obstructive sleep apnea) (3/23/2015)   - uses CPAP at home, will try to have family bring in machine, thinks it is 16 cm H2O, CPAP ordered for use here      Obesity (3/23/2015), morbid   - counseled on weight loss       DM type 2 with diabetic peripheral neuropathy ([facility] (7/4/2019)   - BS okay, here, check A1c, follow on SSI       Code status:   - patient is FULL CODE, no AMD on file, wife Chaka is surrogate      Total time spent on patient care: [address] discussed with: Patient, Family, Nursing Staff and Reza Enriquez NP    Discussed:  Code Status, Care Plan and D/C Planning    Prophylaxis:  Hep SQ and SCD's    Probable Disposition:  HH PT, OT, RN           ___________________________________________________    Attending Physician: Juanjose Ashford MD

## 2019-07-04 NOTE — ED TRIAGE NOTES
Pt arrives with the c/o of sores on the left foot that have been going on for the last couple weeks, pt states was trying to treat at home, pt states noticed increased swelling and redness to leg. Pt denies taking any oral antibiotics at home.

## 2019-07-04 NOTE — PROGRESS NOTES
Gave checkout over the phone at 5:41 PM to Dr. Madalyn Lucero who has accepted care of this patient.       Janice Ramon MD  7/4/2019

## 2019-07-04 NOTE — ED PROVIDER NOTES
The patient is a 52-year-old male with a past medical history significant for coronary artery disease, and STEMI, CABG, diabetes, dyslipidemia, elevated BMI, hypertension, obstructive sleep apnea on CPAP who is ambulatory JVD today with his wife with complaints of left foot sores. Patient states that the sores on his feet started approximately 4 weeks ago. Patient has been self treating at home with hydrogen peroxide, Neosporin and occasional Epsom salts baths. Patient states the woods began to improve but then worsened again. Patient notes starting a fever yesterday. Wife states the patient had shaking chills were he was wrapped in a blanket and laying on a heating pad while shivering. Patient notes left leg swelling that is new and chronic diminished sensation of both feet due to his diabetes. Patient denies nausea, vomiting, diarrhea, constipation, chest pain or shortness of breath. Patient's no further complaints at this time. Primary care provider:Yessica Douglass MD    The history is provided by the patient. No  was used.       Past Medical History:   Diagnosis Date    CAD (coronary artery disease)     NSTEMI 3/21/15; CABG 3/24/15;  LVEF 55%    Diabetes [facility] [address]     Dyslipidemia     FH ischemic heart disease     HTN (hypertension)     Hypertension     NSTEMI (non-ST elevated myocardial infarction) ([address]     NSTEMI 3/21/15    Obesity     MING (obstructive sleep apnea)     surgery in past    MING (obstructive sleep apnea)     on CPAP    Paronychia of great toe of right foot     S/P CABG x 4     CABG 3/24/15 - LIMA TO LAD, SEQUENTIAL VEIN GRAFT TO OM1, OM2; SVG TO DISTAL RCA     Past Surgical History:   Procedure Laterality Date    HX APPENDECTOMY      HX HEENT      eyes       Family History:   Problem Relation Age of Onset    Coronary Artery Disease Mother     Coronary Artery Disease Father      Social History     Socioeconomic History    Marital status:      Spouse name: Not on file    Number of children: Not on file    Years of education: Not on file    Highest education level: Not on file   Occupational History    Not on file   Social Needs    Financial resource strain: Not on file    Food insecurity:     Worry: Not on file     Inability: Not on file    Transportation needs:     Medical: Not on file     Non-medical: Not on file   Tobacco Use    Smoking status: Never Smoker    Smokeless tobacco: Never Used   Substance and Sexual Activity    Alcohol use: Yes     Alcohol/week: 0.0 oz     Comment: rarely    Drug use: No    Sexual activity: Not on file   Lifestyle    Physical activity:     Days per week: Not on file     Minutes per session: Not on file    Stress: Not on file   Relationships    Social connections:     Talks on phone: Not on file     Gets together: Not on file     Attends Congregation service: Not on file     Active member of club or organization: Not on file     Attends meetings of clubs or organizations: Not on file     Relationship status: Not on file    Intimate partner violence:     Fear of current or ex partner: Not on file     Emotionally abused: Not on file     Physically abused: Not on file     Forced sexual activity: Not on file   Other Topics Concern    Not on file   Social History Narrative    Not on file     ALLERGIES: Amoxicillin    Review of Systems   Constitutional: Positive for activity change, chills and fever. Respiratory: Negative for shortness of breath. Cardiovascular: Negative for chest pain. Gastrointestinal: Negative for constipation, diarrhea, nausea and vomiting. Musculoskeletal: Negative for gait problem. Skin: Positive for color change, pallor and wound. Vitals:    07/04/19 1455   BP: 139/72   Pulse: 100   Resp: 16   Temp: 98.5 °F (36.9 °C)   SpO2: 100%   Weight: 109.8 kg (242 lb)   Height: 5' 5\" (1.651 m)          Physical Exam   Constitutional: He is oriented to person, place, and time.  He appears well-developed and well-nourished. HENT:   Head: Atraumatic. Eyes: EOM are normal.   Neck: No tracheal deviation present. Cardiovascular:   Pulses:       Dorsalis pedis pulses are 2+ on the right side, and 1+ on the left side. Posterior tibial pulses are 2+ on the right side, and 1+ on the left side. Pulmonary/Chest: Effort normal. No respiratory distress. Abdominal: Soft. Musculoskeletal: Normal range of motion. Right foot: There is normal range of motion and no deformity. Left foot: There is normal range of motion and no deformity. Feet:   Right Foot:   Protective Sensation: 6 sites tested. 6 sites sensed. Skin Integrity: Negative for ulcer, blister, skin breakdown, erythema, warmth, callus or dry skin. Left Foot:   Protective Sensation: 6 sites tested. 4 sites sensed. Skin Integrity: Positive for ulcer, skin breakdown, erythema and warmth. Negative for blister. Neurological: He is alert and oriented to person, place, and time. GCS eye subscore is 4. GCS verbal subscore is 5. GCS motor subscore is 6. Skin: Skin is warm and dry. Psychiatric: He has a normal mood and affect. His behavior is normal. Judgment and thought content normal.   Nursing note and vitals reviewed. LEFT FOOT:   LATERAL: 2.5 x 2.5 cm ulcer to the lateral metatarsal with central opening of ~ 1 cm x 1 cm. No drainage. DORSAL: 0.7 cm x 0.7 ulcer without tenderness or drainage to the dorsal foot over the 4th metatarsal. (See photo)  Left lower extremity below the knee is erythematous and warm with +3 pitting edema. Right lower extremity without erythema and mild pitting edema =! At best.  See comparison photos below.              MDM       Procedures    Diabetic foot ulcer with cellulitis    Assessment & Plan:     Orders Placed This Encounter    SEPSIS BUNDLE INITIATED IN ED (REQUIRED)    CULTURE, BLOOD, PERIPHERAL    CULTURE, BLOOD    XR CHEST PORT    XR FOOT LEFT COMPLETE    Hold Sample    CBC WITH AUTOMATED DIFF    METABOLIC PANEL, COMPREHENSIVE    POC  LACTIC ACID    VITAL SIGNS - PER UNIT ROUTINE    STRICT I & O    NEUROLOGIC STATUS ASSESSMENT - PER UNIT ROUTINE    POC LACTIC ACID    SALINE LOCK IV ONE TIME Routine    sodium chloride (NS) flush 5-10 mL       Discussed with Pura Willingham DO,ED Provider    Radha Estes NP  07/04/19  3:24 PM    Vancomycin and clindamycin for SIRS cellulitis and diabetic foot ulcer. Radha Estes NP  07/04/19  3:49 PM    Hospitalist Meena for Admission  5:05 PM    ED Room Number: ER02/02  Patient Name and age:  Petr Snatana 80 y.o.  male  Working Diagnosis:   1. Diabetic ulcer of left midfoot associated with type 2 diabetes mellitus, unspecified ulcer stage ([address]    2. Cellulitis of left lower extremity    3. SIRS (systemic inflammatory response syndrome) (HCC)    4. TIMUR (acute kidney injury) ([address]      Readmission: no  Isolation Requirements:  no  Recommended Level of Care:  med/surg  Code Status:  Full  Other:  Plain films without indication of osteo. May need MRI. Patient aware may need MRI on foot. Told patient it would be your decision. 5:09 PM  Patient is being admitted to the hospital.  The results of their tests and reasons for their admission have been discussed with them and/or available family. They convey agreement and understanding for the need to be admitted and for their admission diagnosis. Consultation has been made with the inpatient physician specialist for hospitalization. LABORATORY TESTS:  Recent Results (from the past 12 hour(s))   SAMPLES BEING HELD    Collection Time: 07/04/19  3:09 PM   Result Value Ref Range    SAMPLES BEING HELD LAV,PST,BLUE,GOLD,BLDCS     COMMENT        Add-on orders for these samples will be processed based on acceptable specimen integrity and analyte stability, which may vary by analyte.    CBC WITH AUTOMATED DIFF    Collection Time: 07/04/19  3:09 PM   Result Value Ref Range    WBC 13. 4 (H) 4.1 - 11.1 K/uL    RBC 4.77 4.10 - 5.70 M/uL    HGB 13.9 12.1 - 17.0 g/dL    HCT 42.1 36.6 - 50.3 %    MCV 88.3 80.0 - 99.0 FL    MCH 29.1 26.0 - 34.0 PG    MCHC 33.0 30.0 - 36.5 g/dL    RDW 13.8 11.5 - 14.5 %    PLATELET 785 497 - 123 K/uL    MPV 10.9 8.9 - 12.9 FL    NRBC 0.0 0  WBC    ABSOLUTE NRBC 0.00 0.00 - 0.01 K/uL    NEUTROPHILS 78 (H) 32 - 75 %    LYMPHOCYTES 10 (L) 12 - 49 %    MONOCYTES 10 5 - 13 %    EOSINOPHILS 1 0 - 7 %    BASOPHILS 0 0 - 1 %    IMMATURE GRANULOCYTES 1 (H) 0.0 - 0.5 %    ABS. NEUTROPHILS 10.4 (H) 1.8 - 8.0 K/UL    ABS. LYMPHOCYTES 1.3 0.8 - 3.5 K/UL    ABS. MONOCYTES 1.4 (H) 0.0 - 1.0 K/UL    ABS. EOSINOPHILS 0.1 0.0 - 0.4 K/UL    ABS. BASOPHILS 0.1 0.0 - 0.1 K/UL    ABS. IMM. GRANS. 0.1 (H) 0.00 - 0.04 K/UL    DF AUTOMATED     METABOLIC PANEL, COMPREHENSIVE    Collection Time: 07/04/19  3:09 PM   Result Value Ref Range    Sodium 136 136 - 145 mmol/L    Potassium 5.0 3.5 - 5.1 mmol/L    Chloride 101 97 - 108 mmol/L    CO2 30 21 - 32 mmol/L    Anion gap 5 5 - 15 mmol/L    Glucose 100 65 - 100 mg/dL    BUN 26 (H) 6 - 20 MG/DL    Creatinine 1.70 (H) 0.70 - 1.30 MG/DL    BUN/Creatinine ratio 15 12 - 20      GFR est AA 49 (L) >60 ml/min/1.73m2    GFR est non-AA 40 (L) >60 ml/min/1.73m2    Calcium 8.9 8.5 - 10.1 MG/DL    Bilirubin, total 0.9 0.2 - 1.0 MG/DL    ALT (SGPT) 46 12 - 78 U/L    AST (SGOT) 43 (H) 15 - 37 U/L    Alk. phosphatase 77 45 - 117 U/L    Protein, total 7.8 6.4 - 8.2 g/dL    Albumin 3.9 3.5 - 5.0 g/dL    Globulin 3.9 2.0 - 4.0 g/dL    A-G Ratio 1.0 (L) 1.1 - 2.2     POC LACTIC ACID    Collection Time: 07/04/19  3:16 PM   Result Value Ref Range    Lactic Acid (POC) 1.09 0.40 - 2.00 mmol/L       IMAGING RESULTS:  XR FOOT LT MIN 3 V   Final Result   IMPRESSION: No radiographic evidence of osteomyelitis.  Soft tissue thickening   adjacent to the fifth metatarsal head corresponding to ulcer on exam.      XR CHEST PORT   Final Result   IMPRESSION:   Sternal wires from prior CABG. No acute cardiopulmonary abnormality. Xr Foot Lt Min 3 V    Result Date: 7/4/2019  EXAM: XR FOOT LT MIN 3 V INDICATION: Ulcer to left lateral foot over 5th metatarsal and dorsal foot. Eval for bone involvement. COMPARISON: None. FINDINGS: Three views of the left foot demonstrate soft tissue swelling adjacent to the fifth metatarsal head. There is lucency in the adjacent soft tissues suggestive of subcutaneous air. The metatarsal head is intact. Lucency along the medial aspect of the fifth metatarsal head on a single view is not reproduced on other views. No cortical erosion. No fracture or dislocation. IMPRESSION: No radiographic evidence of osteomyelitis. Soft tissue thickening adjacent to the fifth metatarsal head corresponding to ulcer on exam.    Xr Chest Port    Result Date: 7/4/2019  EXAM:  XR CHEST PORT INDICATION:  SIRS criteria COMPARISON: April 3, 2015 TECHNIQUE: AP portable upright chest view FINDINGS: Lungs are clear. Heart size and mediastinal contours are normal. Sternal wires are noted. No pleural effusion or pneumothorax. Bones are age-appropriate. IMPRESSION: Sternal wires from prior CABG. No acute cardiopulmonary abnormality. MEDICATIONS GIVEN:  Medications   sodium chloride (NS) flush 5-10 mL (has no administration in time range)   clindamycin (CLEOCIN) 600mg NS 50 mL IVPB (premix) (600 mg IntraVENous Given 7/4/19 1647)   vancomycin (VANCOCIN) 2,500 mg in 0.9% sodium chloride 500 mL IVPB (has no administration in time range)   vancomycin (VANCOCIN) 1,750 mg in 0.9% sodium chloride 500 mL IVPB (has no administration in time range)       IMPRESSION:  1. Diabetic ulcer of left midfoot associated with type 2 diabetes mellitus, unspecified ulcer stage ([address]    2. Cellulitis of left lower extremity    3. SIRS (systemic inflammatory response syndrome) (HCC)    4. TIMUR (acute kidney injury) ([address]        PLAN:  1. Admit to hospitalist for IV ABX.        Matteo Plascencia NP    Please note that this dictation was completed with [employer], the computer voice recognition software. Quite often unanticipated grammatical, syntax, homophones, and other interpretive errors are inadvertently transcribed by the computer software. Please disregard these errors. Please excuse any errors that have escaped final proofreading.

## 2019-07-05 ENCOUNTER — ANESTHESIA EVENT (OUTPATIENT)
Dept: SURGERY | Age: 69
DRG: 623 | End: 2019-07-05
Payer: COMMERCIAL

## 2019-07-05 ENCOUNTER — APPOINTMENT (OUTPATIENT)
Dept: VASCULAR SURGERY | Age: 69
DRG: 623 | End: 2019-07-05
Attending: INTERNAL MEDICINE
Payer: COMMERCIAL

## 2019-07-05 ENCOUNTER — APPOINTMENT (OUTPATIENT)
Dept: MRI IMAGING | Age: 69
DRG: 623 | End: 2019-07-05
Attending: PODIATRIST
Payer: COMMERCIAL

## 2019-07-05 LAB
ANION GAP SERPL CALC-SCNC: 7 MMOL/L (ref 5–15)
BUN SERPL-MCNC: 24 MG/DL (ref 6–20)
BUN/CREAT SERPL: 16 (ref 12–20)
CALCIUM SERPL-MCNC: 8.7 MG/DL (ref 8.5–10.1)
CHLORIDE SERPL-SCNC: 105 MMOL/L (ref 97–108)
CO2 SERPL-SCNC: 27 MMOL/L (ref 21–32)
CREAT SERPL-MCNC: 1.47 MG/DL (ref 0.7–1.3)
ERYTHROCYTE [DISTWIDTH] IN BLOOD BY AUTOMATED COUNT: 13.6 % (ref 11.5–14.5)
GLUCOSE BLD STRIP.AUTO-MCNC: 108 MG/DL (ref 65–100)
GLUCOSE BLD STRIP.AUTO-MCNC: 108 MG/DL (ref 65–100)
GLUCOSE BLD STRIP.AUTO-MCNC: 129 MG/DL (ref 65–100)
GLUCOSE BLD STRIP.AUTO-MCNC: 83 MG/DL (ref 65–100)
GLUCOSE SERPL-MCNC: 73 MG/DL (ref 65–100)
HCT VFR BLD AUTO: 42.5 % (ref 36.6–50.3)
HGB BLD-MCNC: 13.7 G/DL (ref 12.1–17)
MAGNESIUM SERPL-MCNC: 2.7 MG/DL (ref 1.6–2.4)
MCH RBC QN AUTO: 28.7 PG (ref 26–34)
MCHC RBC AUTO-ENTMCNC: 32.2 G/DL (ref 30–36.5)
MCV RBC AUTO: 89.1 FL (ref 80–99)
NRBC # BLD: 0 K/UL (ref 0–0.01)
NRBC BLD-RTO: 0 PER 100 WBC
PHOSPHATE SERPL-MCNC: 3.2 MG/DL (ref 2.6–4.7)
PLATELET # BLD AUTO: 187 K/UL (ref 150–400)
PMV BLD AUTO: 9.8 FL (ref 8.9–12.9)
POTASSIUM SERPL-SCNC: 3.6 MMOL/L (ref 3.5–5.1)
PROCALCITONIN SERPL-MCNC: 0.1 NG/ML
RBC # BLD AUTO: 4.77 M/UL (ref 4.1–5.7)
SERVICE CMNT-IMP: ABNORMAL
SERVICE CMNT-IMP: NORMAL
SODIUM SERPL-SCNC: 139 MMOL/L (ref 136–145)
WBC # BLD AUTO: 12.4 K/UL (ref 4.1–11.1)

## 2019-07-05 PROCEDURE — 65270000029 HC RM PRIVATE

## 2019-07-05 PROCEDURE — 36415 COLL VENOUS BLD VENIPUNCTURE: CPT

## 2019-07-05 PROCEDURE — 87040 BLOOD CULTURE FOR BACTERIA: CPT

## 2019-07-05 PROCEDURE — 87077 CULTURE AEROBIC IDENTIFY: CPT

## 2019-07-05 PROCEDURE — 85027 COMPLETE CBC AUTOMATED: CPT

## 2019-07-05 PROCEDURE — 93971 EXTREMITY STUDY: CPT

## 2019-07-05 PROCEDURE — 84100 ASSAY OF PHOSPHORUS: CPT

## 2019-07-05 PROCEDURE — 87186 SC STD MICRODIL/AGAR DIL: CPT

## 2019-07-05 PROCEDURE — 74011250637 HC RX REV CODE- 250/637: Performed by: FAMILY MEDICINE

## 2019-07-05 PROCEDURE — 87205 SMEAR GRAM STAIN: CPT

## 2019-07-05 PROCEDURE — 73718 MRI LOWER EXTREMITY W/O DYE: CPT

## 2019-07-05 PROCEDURE — 74011000258 HC RX REV CODE- 258: Performed by: INTERNAL MEDICINE

## 2019-07-05 PROCEDURE — 74011250637 HC RX REV CODE- 250/637: Performed by: INTERNAL MEDICINE

## 2019-07-05 PROCEDURE — 0JBR0ZZ EXCISION OF LEFT FOOT SUBCUTANEOUS TISSUE AND FASCIA, OPEN APPROACH: ICD-10-PCS | Performed by: PODIATRIST

## 2019-07-05 PROCEDURE — 84145 PROCALCITONIN (PCT): CPT

## 2019-07-05 PROCEDURE — 82962 GLUCOSE BLOOD TEST: CPT

## 2019-07-05 PROCEDURE — 80048 BASIC METABOLIC PNL TOTAL CA: CPT

## 2019-07-05 PROCEDURE — 83735 ASSAY OF MAGNESIUM: CPT

## 2019-07-05 PROCEDURE — 74011250636 HC RX REV CODE- 250/636: Performed by: INTERNAL MEDICINE

## 2019-07-05 RX ORDER — ROSUVASTATIN CALCIUM 10 MG/1
10 TABLET, COATED ORAL
Status: DISCONTINUED | OUTPATIENT
Start: 2019-07-05 | End: 2019-07-08 | Stop reason: HOSPADM

## 2019-07-05 RX ORDER — PANTOPRAZOLE SODIUM 40 MG/1
40 TABLET, DELAYED RELEASE ORAL
Status: DISCONTINUED | OUTPATIENT
Start: 2019-07-05 | End: 2019-07-08 | Stop reason: HOSPADM

## 2019-07-05 RX ORDER — MONTELUKAST SODIUM 10 MG/1
10 TABLET ORAL
Status: DISCONTINUED | OUTPATIENT
Start: 2019-07-05 | End: 2019-07-08 | Stop reason: HOSPADM

## 2019-07-05 RX ADMIN — SODIUM CHLORIDE 100 ML/HR: 900 INJECTION, SOLUTION INTRAVENOUS at 05:46

## 2019-07-05 RX ADMIN — MEROPENEM 500 MG: 500 INJECTION, POWDER, FOR SOLUTION INTRAVENOUS at 20:03

## 2019-07-05 RX ADMIN — MONTELUKAST SODIUM 10 MG: 10 TABLET, FILM COATED ORAL at 22:20

## 2019-07-05 RX ADMIN — MONTELUKAST SODIUM 10 MG: 10 TABLET, FILM COATED ORAL at 03:12

## 2019-07-05 RX ADMIN — MEROPENEM 500 MG: 500 INJECTION, POWDER, FOR SOLUTION INTRAVENOUS at 13:10

## 2019-07-05 RX ADMIN — MEROPENEM 500 MG: 500 INJECTION, POWDER, FOR SOLUTION INTRAVENOUS at 08:33

## 2019-07-05 RX ADMIN — MEROPENEM 500 MG: 500 INJECTION, POWDER, FOR SOLUTION INTRAVENOUS at 02:13

## 2019-07-05 RX ADMIN — HEPARIN SODIUM 5000 UNITS: 5000 INJECTION INTRAVENOUS; SUBCUTANEOUS at 13:10

## 2019-07-05 RX ADMIN — SODIUM CHLORIDE 100 ML/HR: 900 INJECTION, SOLUTION INTRAVENOUS at 17:16

## 2019-07-05 RX ADMIN — PANTOPRAZOLE SODIUM 40 MG: 40 TABLET, DELAYED RELEASE ORAL at 06:58

## 2019-07-05 RX ADMIN — ROSUVASTATIN CALCIUM 10 MG: 10 TABLET, FILM COATED ORAL at 03:12

## 2019-07-05 RX ADMIN — ROSUVASTATIN CALCIUM 10 MG: 10 TABLET, FILM COATED ORAL at 22:20

## 2019-07-05 RX ADMIN — Medication 10 ML: at 22:20

## 2019-07-05 RX ADMIN — Medication 10 ML: at 05:47

## 2019-07-05 RX ADMIN — HEPARIN SODIUM 5000 UNITS: 5000 INJECTION INTRAVENOUS; SUBCUTANEOUS at 05:46

## 2019-07-05 RX ADMIN — VANCOMYCIN HYDROCHLORIDE 1750 MG: 10 INJECTION, POWDER, LYOPHILIZED, FOR SOLUTION INTRAVENOUS at 17:13

## 2019-07-05 NOTE — ROUTINE PROCESS
Bedside shift change report given to [address] [place] [address] (oncoming nurse) by Consuelo Aviles (offgoing nurse). Report included the following information SBAR, Kardex, Procedure Summary, Intake/Output, MAR, Accordion, Recent Results and Med Rec Status.

## 2019-07-05 NOTE — PROGRESS NOTES
CM Note:  Reason for Admission:   Diabetic foot infection               RRAT Score:     22             Resources/supports as identified by patient/family:  Good family support                 Top Challenges facing patient (as identified by patient/family and CM): Finances/Medication cost?      Has insurance and Rx coverage. He gets his medications from [facility] on [address]. Transportation? Pt and family drive              Support system or lack thereof? See above                     Living arrangements? Tri-level house with 5 entry steps and 7 interior stairs. Self-care/ADLs/Cognition? Pt is alert and oriented and able to care for himself. Current Advanced Directive/Advance Care Plan:  Full code                          Plan for utilizing home health: To be determined                 Transition of Care Plan:                1. Podiatry consult  2. IV abx  3. Pt's wife to transport at d/c  . Kaitlynn Fontenot RN    Care Management Interventions  PCP Verified by CM: Yes(Dr. Ramos Hand. No NN.)  Palliative Care Criteria Met (RRAT>21 & CHF Dx)?: No  Mode of Transport at Discharge:  Other (see comment)(wife)  MyChart Signup: No  Discharge Durable Medical Equipment: No(Has cpap at home from Health system)  Physical Therapy Consult: No  Occupational Therapy Consult: No  Speech Therapy Consult: No  Current Support Network: Lives with Spouse, Own Home, Other(Daughter & her 2 children also live with them in a tri-level house with 7 interior stairs and 5 entry steps.)  Plan discussed with Pt/Family/Caregiver: Yes  Discharge Location  Discharge Placement: Home

## 2019-07-05 NOTE — CONSULTS
The [address] [facility] Podiatric Surgery  H & P Note    Date: July 5, 2019  Patient: Nain Santana  MR #: [id number]  [facility] #: [id number]  Attending/Admitting Physician: Dr Jose Brody MD    Reason for Consult:  Dr Jose Brody MD asked me to see Nain Santana for evaluation and treatment of left foot infection. He is afebrile. 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 Has past medical hx including diabetes, neuropathy and cad. History of Present Illness:  Patient is a 80 y.o. male presents with a several week history of left foot ulceration which has worsened. Has never seen a podiatrist for at risk diabetic foot management. No pain to the left foot. Radiographs obtained. Cultures obtained. Wound care has not been done. No previous antibiotics. Worsened with shoe irritation. ROS:   Constitutional: Negative for fever, chills, weight loss and malaise/fatigue. HENT: Negative for nosebleeds and congestion. Eyes: Negative for blurred vision and double vision. Wears glasses  Respiratory: Negative for cough, shortness of breath and wheezing. Cardiovascular: Negative for chest pain, palpitations, claudication and leg swelling. Gastrointestinal: Negative for heartburn, nausea, vomiting, abdominal pain and diarrhea. Genitourinary: Negative for dysuria and urgency. Musculoskeletal: Negative for myalgias and joint pain. Skin: ulcer left foot  Neurological: Negative for dizziness, focal weakness and headaches. Endo/Heme/Allergies: Negative for environmental allergies. Does not bruise/bleed easily. Psychiatric/Behavioral: Negative for depression and suicidal ideas. The patient is not nervous/anxious. Blood pressure 134/70, pulse 83, temperature 98 °F (36.7 °C), resp. rate 18, height 5' 5\" (1.651 m), weight 112.1 kg (247 lb 1.6 oz), SpO2 92 %.     Intake/Output Summary (Last 24 hours) at 7/5/2019 1538  Last data filed at 7/5/2019 0227  Gross per 24 hour   Intake 1495 ml   Output --   Net 8455 ml         Medical History:  Past Medical History:   Diagnosis Date    CAD (coronary artery disease)     NSTEMI 3/21/15; CABG 3/24/15;  LVEF 55%    [facility]     Dyslipidemia     FH ischemic heart disease     HTN (hypertension)     Hypertension     [facility]STEMI (non-ST elevated myocardial infarction) [facility]     NSTEMI 3/21/15    Obesity     MING (obstructive sleep apnea)     surgery in past    MING (obstructive sleep apnea)     on CPAP    Paronychia of great toe of right foot     S/P CABG x 4     CABG 3/24/15 - LIMA TO LAD, SEQUENTIAL VEIN GRAFT TO OM1, OM2; SVG TO DISTAL RCA     Past Surgical History:   Procedure Laterality Date    HX APPENDECTOMY      HX HEENT      eyes     [unfilled]  Allergies   Allergen Reactions    Amoxicillin Hives     Family History   Problem Relation Age of Onset    Coronary Artery Disease Mother     Coronary Artery Disease Father      Social History     Tobacco Use   Smoking Status Never Smoker   Smokeless Tobacco Never Used     Social History     Substance and Sexual Activity   Drug Use No     Social History     Substance and Sexual Activity   Alcohol Use Yes    Alcohol/week: 0.0 oz    Comment: rarely       Labs:  Lab Results   Component Value Date/Time    WBC 12.4 (H) 07/05/2019 02:48 AM    HGB 13.7 07/05/2019 02:48 AM    HCT 42.5 07/05/2019 02:48 AM    MCV 89.1 07/05/2019 02:48 AM    PLATELET 152 04/15/0799 02:48 AM     Lab Results   Component Value Date/Time    Sodium 139 07/05/2019 02:48 AM    Potassium 3.6 07/05/2019 02:48 AM    Chloride 105 07/05/2019 02:48 AM    CO2 27 07/05/2019 02:48 AM    Phosphorus 3.2 07/05/2019 02:48 AM    BUN 24 (H) 07/05/2019 02:48 AM    Calcium 8.7 07/05/2019 02:48 AM      Lab Results   Component Value Date/Time    Glucose 73 07/05/2019 02:48 AM     Lab Results   Component Value Date/Time    INR 1.2 (H) 03/26/2015 04:20 AM    No components found for: PTT  Recent Labs     07/05/19  0248 07/04/19  1509   GLU 73 100     Current Facility-Administered Medications   Medication Dose Route Frequency    lactated Ringers infusion  125 mL/hr IntraVENous CONTINUOUS    sodium chloride (NS) flush 5-40 mL  5-40 mL IntraVENous Q8H    rosuvastatin (CRESTOR) tablet 10 mg  10 mg Oral QHS    montelukast (SINGULAIR) tablet 10 mg  10 mg Oral QHS    dapagliflozin (FARXIGA) tablet 10 mg (Patient Supplied)  10 mg Oral DAILY    liraglutide (VICTOZA) 0.6 mg/0.1 mL (18 mg/3 mL) sub-q pen 1.8 mg (Patient Supplied)  1.8 mg SubCUTAneous QHS    pantoprazole (PROTONIX) tablet 40 mg  40 mg Oral ACB    Vancomycin - Please draw trough level prior to dose due on 7/6 at 1700. Thanks!    Other ONCE    vancomycin (VANCOCIN) 1,750 mg in 0.9% sodium chloride 500 mL IVPB  1,750 mg IntraVENous Q24H    meropenem (MERREM) 500 mg in 0.9% sodium chloride (MBP/ADV) 50 mL  0.5 g IntraVENous Q6H    0.9% sodium chloride infusion  100 mL/hr IntraVENous CONTINUOUS    sodium chloride (NS) flush 5-40 mL  5-40 mL IntraVENous Q8H    insulin lispro (HUMALOG) injection   SubCUTAneous AC&HS     Physical Exam:  Gen:    Well-developed, morbidly obese, in no acute distress  HEENT:  Pink conjunctivae, PERRL, hearing intact to voice, moist mucous membranes  Neck:  Supple, without masses, thyroid non-tender  Resp:  No accessory muscle use, clear breath sounds without wheezes rales or rhonchi  Card:   No murmurs, normal S1, S2 without thrills, bruits or peripheral edema, 2+ LE peripheral pulses  Abd:  Soft, non-tender, non-distended, normoactive bowel sounds are present, no palpable organomegaly and no detectable hernias  Lymph:  No cervical or inguinal adenopathy    Lower Extremity Exam:  Vascular:    Dorsalis Pedis Pulse: present  Posterior Tibialis Pulse: present  Popliteal and Femoral Pulses: present  Skin Temp: warm left foot  Extremity Edema: +1 pitting edema left foot  Varicosities: present    Neurological:  Deep Tendon Reflexes of Achilles and Patellar: intact right and left  Epicritic and Protective Sensations: absent  Deep Pain Response: absent    Dermatological:  Toenails: thickened nails 1-5 right and left foot  Ulceration: Ulceration left foot lateral to the 5th metatarsal head. Measures 0.8cm x 0.8cm x 0.1cm pre-debridement, 1cm x 1cm x 0.4cm post debridement  Fibrotic, slough and purulence noted to the wound with dorsal undermining over the metatarsal head  Erythema extending over the dorsal foot proximally about 4cm    Dorsal midfoot ulceration  Measures 0.5cm x 0.5cm x 0.1cm. Fibrotic wound base    Musculoskeletal:  Gait and Station: antalgic  Muscle Strength of Major Muscle Groups: 4/5 right and left lower extremity  Range of Motion: diminished ankle range of motion left foot ankle joint. Forefoot joints within normal limits  Limb Deformities: tailors bunion right and left foot  Previous Amputations: none    Imaging Modalities:  EXAM: XR FOOT LT MIN 3 V     INDICATION: Ulcer to left lateral foot over 5th metatarsal and dorsal foot. Eval  for bone involvement.     COMPARISON: None.     FINDINGS: Three views of the left foot demonstrate soft tissue swelling adjacent  to the fifth metatarsal head. There is lucency in the adjacent soft tissues  suggestive of subcutaneous air. The metatarsal head is intact. Lucency along the  medial aspect of the fifth metatarsal head on a single view is not reproduced on  other views. No cortical erosion. No fracture or dislocation.     IMPRESSION  IMPRESSION: No radiographic evidence of osteomyelitis. Soft tissue thickening  adjacent to the fifth metatarsal head corresponding to ulcer on exam.     -PRELIMINARY REPORT-     Acute osteomyelitis of the fifth metatarsal head with surrounding soft tissue  swelling/edema. There is an ulcer noted within the lateral soft tissues of the  foot overlying the fifth metatarsal head.     Preliminary report was provided by Dr. Azeb Morton, the on-call radiologist, at 8:19  PM on 7/5/2019.     Final report to follow. Vascular Studies: Venous duplex negative for thrombus    Microbiological:  Nares culture negative for MRSA  Wound Culture:  GRAM STAIN NO WBC'S SEEN      Final   GRAM STAIN 2+   GRAM POSITIVE COCCI   IN PAIRS      Final   Culture result: HEAVY   STAPHYLOCOCCUS AUREUS          Blood culture: ngtd    Impression:  1. Cellulitis left foot  2. Abscess left foot  3. Osteomyelitis left foot 5th metatarsal  4. DM with neuropathy    Plan:  --  Evaluation and management of left foot ulceration with infection. Wound is severe and deep. Ordered and reviewed MRI. Findings consistent with osteomyelitis with abscess of the left foot 5th metatarsal head. Concern also to the proximal phalanx of the left 5th toe. Continue empiric antibiotics. Recommend surgical decompression of infection, abscess, osteomyelitis with surgical intervention. Discussed procedures at length with the patient and family including expected post operative course, risks, alternatives, and possible complications. No guarantees given and patient and family have elected to proceed. NPO midnight. Consent: left foot incision and drainage, debridement of bone, resection of 5th metatarsal. Patient and family understand this is a staged procedure and will likely require leaving wound open and delayed primary closure. OR tomorrow morning    --  The nature of the finding, probable diagnosis and likely treatment was thoroughly discussed with the patient, family and spouse. The options, risks, complications, alternative treatment as well as some of the differential diagnosis was discussed. The patient was thoroughly informed and all questions were answered. the patient indicated understanding and satisfaction with the discussion. Limb threatening infection. -- Procedure: Patient apprised of the procedure and informed consent obtained.  Excisional debridement performed to the left foot ulceration into and through subcutaneous tissue via forceps, scissors, and 15 blade with removal of the devitalized tissue of said level. Patient tolerated procedure well and a post procedure dressing applied. Thanks for calling. Will follow. Sylvie Fragoso.  ASTRID Sumner FACFAS FACNorthstar Hospital  Triple Board Certified Foot & Ankle Specialist  [phone] cell  7/5/2019  3:38 PM

## 2019-07-05 NOTE — PROGRESS NOTES
Bedside and Verbal shift change report given to [address], RN (oncoming nurse) by Ana M Finley RN (offgoing nurse). Report included the following information SBAR, Kardex, Intake/Output, MAR, Accordion and Recent Results.

## 2019-07-05 NOTE — ANESTHESIA PREPROCEDURE EVALUATION
Relevant Problems   No relevant active problems       Anesthetic History   No history of anesthetic complications            Review of Systems / Medical History  Patient summary reviewed and pertinent labs reviewed    Pulmonary        Sleep apnea: CPAP           Neuro/Psych   Within defined limits           Cardiovascular    Hypertension          Past MI, CAD and CABG      Comments: NSTEMI and CABG 2015  Sees Cardiologist yearly, stable CAD; notes reviewed   GI/Hepatic/Renal         Renal disease: CRI      Comments: Cr 1.28 Endo/Other    Diabetes: poorly controlled    Morbid obesity     Other Findings   Comments: Diabetic foot infection         Physical Exam    Airway  Mallampati: III    Neck ROM: normal range of motion   Mouth opening: Normal     Cardiovascular    Rhythm: regular  Rate: normal         Dental  No notable dental hx       Pulmonary  Breath sounds clear to auscultation               Abdominal  GI exam deferred       Other Findings            Anesthetic Plan    ASA: 3  Anesthesia type: general          Induction: Intravenous  Anesthetic plan and risks discussed with: Patient

## 2019-07-05 NOTE — PROGRESS NOTES
Bedside and Verbal shift change report given to Darlene Washington RN (oncoming nurse) by Chiqui Salas RN (offgoing nurse). Report included the following information SBAR, Kardex, ED Summary, Intake/Output, MAR, Accordion, Recent Results and Med Rec Status.

## 2019-07-05 NOTE — ROUTINE PROCESS
Pt called out stating he did not get any of the medications tonight he \"usually takes\". Asked patient which medications he was referring to. Called Dr. Wyn Nissen who was on call. She stated crestor, prilosec, singulair, victoza, and farxiga could be added to [facility] from PTA list. Physician did not feel comfortable adding tradjenta despite patient request. Will revisit in morning. Orders placed.

## 2019-07-05 NOTE — PROGRESS NOTES
Daily Progress Note: 7/5/2019  Yessica Carroll MD    Assessment/Plan:   Diabetic foot infection [facility]) (7/4/2019)              - continue vancomycin, Merrem              - send procalcitonin, patient does not appear septic at this time              - consulted Podiatry               - may need further imaging        CAD (coronary artery disease) (3/23/2015)              - stable, continue cardiac meds       Elevated serum creatinine (7/4/2019)              - baseline last year was ~1 so this appears to be acutely elevated, possible ARF              - hydrate              - hold off on any radiologic contrast for now       HTN (hypertension) (3/23/2015)              - BP okay, follow on home meds except ACE and diuretic in light of elevated creatinine       MING (obstructive sleep apnea) (3/23/2015)              - uses CPAP at home, will try to have family bring in machine, thinks it is 16 cm H2O,               CPAP ordered for use here       Obesity (3/23/2015), morbid              - counseled on weight loss        DM type 2 with diabetic peripheral neuropathy ([facility] (7/4/2019)              - BS okay, here, check A1c, follow on SSI       Code status:              - patient is FULL CODE, no AMD on file, wife Chaka is surrogate               Problem List:  Problem List as of 7/5/2019 Date Reviewed: 7/4/2019          Codes Class Noted - Resolved    * (Principal) Diabetic foot infection ([facility] ICD-10-CM: E11.628, L08.9  ICD-9-CM: 250.80, 686.9  7/4/2019 - Present        Elevated serum creatinine ICD-10-CM: R79.89  ICD-9-CM: 790.99  7/4/2019 - Present        DM type 2 with diabetic peripheral neuropathy ([facility] (Chronic) ICD-10-CM: E11.42  ICD-9-CM: 250.60, 357.2  7/4/2019 - Present        S/P CABG x 4 (Chronic) ICD-10-CM: Z95.1  ICD-9-CM: V45.81  3/25/2015 - Present        CAD (coronary artery disease) (Chronic) ICD-10-CM: I25.10  ICD-9-CM: 414.00  3/23/2015 - Present    Overview Signed 3/23/2015 12:05 PM by Abelardo Becker     ECHO: 3/21/2015: EF 55-60%, no WMA, mild LVH, MAC, no MR   CATH: 3/23/2015: Obstructive 3VD:dLM:80%, mLAD:80%, dLAD:70%, D1:90% mLCX: 90%, mRCA:50%, PDA: 80%, EF 55%, Focal mid inferior AK. HTN (hypertension) (Chronic) ICD-10-CM: I10  ICD-9-CM: 401.9  3/23/2015 - Present        MING (obstructive sleep apnea) (Chronic) ICD-10-CM: G47.33  ICD-9-CM: 327.23  3/23/2015 - Present        Obesity (Chronic) ICD-10-CM: E66.9  ICD-9-CM: 278.00  3/23/2015 - Present        RESOLVED: Paronychia of great toe of right foot ICD-10-CM: L03.031  ICD-9-CM: 681.11  3/23/2015 - 7/4/2019        RESOLVED: Unstable angina (HCC) ICD-10-CM: I20.0  ICD-9-CM: 411.1  3/21/2015 - 7/4/2019        RESOLVED: NSTEMI (non-ST elevated myocardial infarction) ([facility] ICD-10-CM: I21.4  ICD-9-CM: 410.70  3/21/2015 - 7/4/2019              Subjective:    80 y.o.  male who is admitted with diabetic foot infection. Mr. April Hassan presented to the Emergency Department this PM complaining of swollen foot: left side, constant, for the past day or so, associated with redness and fever; has had foot sores for the past 4+ weeks and has been self-treating at home without improvement  History obtained from chart review and the patient. Previous records reviewed: admit here in 2015 with NSTEMI that led to transfer for [facility] and eventual CABG for CAD, outpatient follow up with Cardiology (Dr Monisha Lombardo)    7/5:  Little pain in foot. He thinks there is less redness. No other complaints. Reports glucoses have been under good control outpt. Discussed meds and which ones should be held with elevated Creat. Creat improved from yesterday - cont to monitor. Podiatry to see today - further imaging per podiatry.       Review of Systems:   A comprehensive review of systems was negative except for that written in the HPI.     Objective:   Physical Exam:   Visit Vitals  /59 (BP 1 Location: Left arm, BP Patient Position: At rest) Pulse 76   Temp 98 °F (36.7 °C)   Resp 18   Ht 5' 5\" (1.651 m)   Wt 109.8 kg (242 lb)   SpO2 93%   BMI 40.27 kg/m²      O2 Device: Room air    Temp (24hrs), Av.4 °F (37.4 °C), Min:98 °F (36.7 °C), Max:100.9 °F (38.3 °C)    No intake/output data recorded. 1901 -  07  In: 7668 [I.V.:1495]  Out: -   General:  Alert, morbidly obese, cooperative, no distress, appears stated age. Head:  Normocephalic, without obvious abnormality, atraumatic. Eyes:  Conjunctivae/corneas clear. PERRL, EOMs intact. Nose: Nares normal. Septum midline. Mucosa normal. No drainage or sinus tenderness. Throat: Lips, mucosa, and tongue moist..   Neck: Supple, symmetrical, trachea midline, no adenopathy, thyroid: no enlargement/tenderness/nodules, no carotid bruit and no JVD. Back:   Symmetric, no curvature. ROM normal. No CVA tenderness. Lungs:   Clear to auscultation bilaterally. Chest wall:  No tenderness or deformity. Heart:  Regular rate and rhythm, S1, S2 normal, no murmur, click, rub or gallop. Abdomen:   Soft, non-tender. Bowel sounds normal. No masses,  No organomegaly. Extremities: no cyanosis. Some edema/swelling present in left foot/ankle. No calf tenderness or cords. Pulses: 2+ and symmetric all extremities. Skin: Redness and swollen lateral left foot, dorsum, and toes. Small ulcer lateral dorsum and another larger, 1.2cm diam, lateral approx over 5th MTP.   turgor normal.    Neurologic: CNII-XII intact. Alert and oriented X 3. Fine motor of hands and fingers normal.   equal.  No cogwheeling or rigidity. Gait not tested at this time. Sensation grossly normal to touch.   Gross motor of extremities normal.       Data Review:       Recent Days:  Recent Labs     19  1509   WBC 12.4* 13.4*   HGB 13.7 13.9   HCT 42.5 42.1    179     Recent Labs     19  0248 19  1509    136   K 3.6 5.0    101   CO2 27 30   GLU 73 100   BUN 24* 26* CREA 1.47* 1.70*   CA 8.7 8.9   MG 2.7*  --    PHOS 3.2  --    ALB  --  3.9   TBILI  --  0.9   SGOT  --  43*   ALT  --  46     No results for input(s): PH, PCO2, PO2, HCO3, FIO2 in the last 72 hours. 24 Hour Results:  Recent Results (from the past 24 hour(s))   SAMPLES BEING HELD    Collection Time: 07/04/19  3:09 PM   Result Value Ref Range    SAMPLES BEING HELD LAV,PST,BLUE,GOLD,BLDCS     COMMENT        Add-on orders for these samples will be processed based on acceptable specimen integrity and analyte stability, which may vary by analyte. CBC WITH AUTOMATED DIFF    Collection Time: 07/04/19  3:09 PM   Result Value Ref Range    WBC 13.4 (H) 4.1 - 11.1 K/uL    RBC 4.77 4.10 - 5.70 M/uL    HGB 13.9 12.1 - 17.0 g/dL    HCT 42.1 36.6 - 50.3 %    MCV 88.3 80.0 - 99.0 FL    MCH 29.1 26.0 - 34.0 PG    MCHC 33.0 30.0 - 36.5 g/dL    RDW 13.8 11.5 - 14.5 %    PLATELET 907 719 - 548 K/uL    MPV 10.9 8.9 - 12.9 FL    NRBC 0.0 0  WBC    ABSOLUTE NRBC 0.00 0.00 - 0.01 K/uL    NEUTROPHILS 78 (H) 32 - 75 %    LYMPHOCYTES 10 (L) 12 - 49 %    MONOCYTES 10 5 - 13 %    EOSINOPHILS 1 0 - 7 %    BASOPHILS 0 0 - 1 %    IMMATURE GRANULOCYTES 1 (H) 0.0 - 0.5 %    ABS. NEUTROPHILS 10.4 (H) 1.8 - 8.0 K/UL    ABS. LYMPHOCYTES 1.3 0.8 - 3.5 K/UL    ABS. MONOCYTES 1.4 (H) 0.0 - 1.0 K/UL    ABS. EOSINOPHILS 0.1 0.0 - 0.4 K/UL    ABS. BASOPHILS 0.1 0.0 - 0.1 K/UL    ABS. IMM.  GRANS. 0.1 (H) 0.00 - 0.04 K/UL    DF AUTOMATED     CULTURE, BLOOD    Collection Time: 07/04/19  3:09 PM   Result Value Ref Range    Special Requests: NO SPECIAL REQUESTS      Culture result: NO GROWTH AFTER 15 HOURS     METABOLIC PANEL, COMPREHENSIVE    Collection Time: 07/04/19  3:09 PM   Result Value Ref Range    Sodium 136 136 - 145 mmol/L    Potassium 5.0 3.5 - 5.1 mmol/L    Chloride 101 97 - 108 mmol/L    CO2 30 21 - 32 mmol/L    Anion gap 5 5 - 15 mmol/L    Glucose 100 65 - 100 mg/dL    BUN 26 (H) 6 - 20 MG/DL    Creatinine 1.70 (H) 0.70 - 1.30 MG/DL BUN/Creatinine ratio 15 12 - 20      GFR est AA 49 (L) >60 ml/min/1.73m2    GFR est non-AA 40 (L) >60 ml/min/1.73m2    Calcium 8.9 8.5 - 10.1 MG/DL    Bilirubin, total 0.9 0.2 - 1.0 MG/DL    ALT (SGPT) 46 12 - 78 U/L    AST (SGOT) 43 (H) 15 - 37 U/L    Alk.  phosphatase 77 45 - 117 U/L    Protein, total 7.8 6.4 - 8.2 g/dL    Albumin 3.9 3.5 - 5.0 g/dL    Globulin 3.9 2.0 - 4.0 g/dL    A-G Ratio 1.0 (L) 1.1 - 2.2     HEMOGLOBIN A1C WITH EAG    Collection Time: 07/04/19  3:09 PM   Result Value Ref Range    Hemoglobin A1c 8.5 (H) 4.2 - 6.3 %    Est. average glucose 197 mg/dL   POC LACTIC ACID    Collection Time: 07/04/19  3:16 PM   Result Value Ref Range    Lactic Acid (POC) 1.09 0.40 - 2.00 mmol/L   PROCALCITONIN    Collection Time: 07/04/19  5:29 PM   Result Value Ref Range    Procalcitonin 0.1 ng/mL   URINALYSIS W/ REFLEX CULTURE    Collection Time: 07/04/19  6:06 PM   Result Value Ref Range    Color YELLOW/STRAW      Appearance CLEAR CLEAR      Specific gravity 1.019 1.003 - 1.030      pH (UA) 5.5 5.0 - 8.0      Protein NEGATIVE  NEG mg/dL    Glucose >1,000 (A) NEG mg/dL    Ketone NEGATIVE  NEG mg/dL    Bilirubin NEGATIVE  NEG      Blood NEGATIVE  NEG      Urobilinogen 1.0 0.2 - 1.0 EU/dL    Nitrites NEGATIVE  NEG      Leukocyte Esterase NEGATIVE  NEG      WBC 0-4 0 - 4 /hpf    RBC 0-5 0 - 5 /hpf    Epithelial cells FEW FEW /lpf    Bacteria NEGATIVE  NEG /hpf    UA:UC IF INDICATED CULTURE NOT INDICATED BY UA RESULT CNI      Hyaline cast 0-2 0 - 5 /lpf   GLUCOSE, POC    Collection Time: 07/04/19  6:11 PM   Result Value Ref Range    Glucose (POC) 81 65 - 100 mg/dL    Performed by Salome Vargas (PCT)    GLUCOSE, POC    Collection Time: 07/04/19  8:51 PM   Result Value Ref Range    Glucose (POC) 124 (H) 65 - 100 mg/dL    Performed by Ava Butts (PCT)    CULTURE, BLOOD, PERIPHERAL    Collection Time: 07/05/19  2:48 AM   Result Value Ref Range    Special Requests: NO SPECIAL REQUESTS      Culture result: NO GROWTH AFTER 3 HOURS     CBC W/O DIFF    Collection Time: 07/05/19  2:48 AM   Result Value Ref Range    WBC 12.4 (H) 4.1 - 11.1 K/uL    RBC 4.77 4.10 - 5.70 M/uL    HGB 13.7 12.1 - 17.0 g/dL    HCT 42.5 36.6 - 50.3 %    MCV 89.1 80.0 - 99.0 FL    MCH 28.7 26.0 - 34.0 PG    MCHC 32.2 30.0 - 36.5 g/dL    RDW 13.6 11.5 - 14.5 %    PLATELET 953 229 - 998 K/uL    MPV 9.8 8.9 - 12.9 FL    NRBC 0.0 0  WBC    ABSOLUTE NRBC 0.00 0.00 - 0.01 K/uL   MAGNESIUM    Collection Time: 07/05/19  2:48 AM   Result Value Ref Range    Magnesium 2.7 (H) 1.6 - 2.4 mg/dL   METABOLIC PANEL, BASIC    Collection Time: 07/05/19  2:48 AM   Result Value Ref Range    Sodium 139 136 - 145 mmol/L    Potassium 3.6 3.5 - 5.1 mmol/L    Chloride 105 97 - 108 mmol/L    CO2 27 21 - 32 mmol/L    Anion gap 7 5 - 15 mmol/L    Glucose 73 65 - 100 mg/dL    BUN 24 (H) 6 - 20 MG/DL    Creatinine 1.47 (H) 0.70 - 1.30 MG/DL    BUN/Creatinine ratio 16 12 - 20      GFR est AA 58 (L) >60 ml/min/1.73m2    GFR est non-AA 48 (L) >60 ml/min/1.73m2    Calcium 8.7 8.5 - 10.1 MG/DL   PHOSPHORUS    Collection Time: 07/05/19  2:48 AM   Result Value Ref Range    Phosphorus 3.2 2.6 - 4.7 MG/DL   PROCALCITONIN    Collection Time: 07/05/19  2:48 AM   Result Value Ref Range    Procalcitonin 0.1 ng/mL   GLUCOSE, POC    Collection Time: 07/05/19  7:04 AM   Result Value Ref Range    Glucose (POC) 83 65 - 100 mg/dL    Performed by Wicho Doctor (PCT)        Medications reviewed  Current Facility-Administered Medications   Medication Dose Route Frequency    rosuvastatin (CRESTOR) tablet 10 mg  10 mg Oral QHS    montelukast (SINGULAIR) tablet 10 mg  10 mg Oral QHS    dapagliflozin (FARXIGA) tablet 10 mg (Patient Supplied)  10 mg Oral DAILY    liraglutide (VICTOZA) 0.6 mg/0.1 mL (18 mg/3 mL) sub-q pen 1.8 mg (Patient Supplied)  1.8 mg SubCUTAneous QHS    pantoprazole (PROTONIX) tablet 40 mg  40 mg Oral ACB    [START ON 7/6/2019] Vancomycin - Please draw trough level prior to dose due on 7/6 at 1700. Thanks! Other ONCE    sodium chloride (NS) flush 5-10 mL  5-10 mL IntraVENous PRN    vancomycin (VANCOCIN) 1,750 mg in 0.9% sodium chloride 500 mL IVPB  1,750 mg IntraVENous Q24H    meropenem (MERREM) 500 mg in 0.9% sodium chloride (MBP/ADV) 50 mL  0.5 g IntraVENous Q6H    0.9% sodium chloride infusion  100 mL/hr IntraVENous CONTINUOUS    sodium chloride (NS) flush 5-40 mL  5-40 mL IntraVENous Q8H    sodium chloride (NS) flush 5-40 mL  5-40 mL IntraVENous PRN    acetaminophen (TYLENOL) tablet 650 mg  650 mg Oral Q4H PRN    oxyCODONE-acetaminophen (PERCOCET) 5-325 mg per tablet 1 Tab  1 Tab Oral Q4H PRN    HYDROmorphone (DILAUDID) syringe 0.5 mg  0.5 mg IntraVENous Q4H PRN    prochlorperazine (COMPAZINE) injection 10 mg  10 mg IntraVENous Q6H PRN    insulin lispro (HUMALOG) injection   SubCUTAneous AC&HS    glucose chewable tablet 16 g  4 Tab Oral PRN    dextrose (D50W) injection syrg 12.5-25 g  25-50 mL IntraVENous PRN    glucagon (GLUCAGEN) injection 1 mg  1 mg IntraMUSCular PRN    heparin (porcine) injection 5,000 Units  5,000 Units SubCUTAneous Q8H    melatonin tablet 3 mg  3 mg Oral QHS PRN       Care Plan discussed with: Patient/Family and Nurse    Total time spent with patient: 30 minutes.     Colt Aguilar MD

## 2019-07-06 ENCOUNTER — ANESTHESIA (OUTPATIENT)
Dept: SURGERY | Age: 69
DRG: 623 | End: 2019-07-06
Payer: COMMERCIAL

## 2019-07-06 ENCOUNTER — APPOINTMENT (OUTPATIENT)
Dept: GENERAL RADIOLOGY | Age: 69
DRG: 623 | End: 2019-07-06
Attending: PODIATRIST
Payer: COMMERCIAL

## 2019-07-06 LAB
ALBUMIN SERPL-MCNC: 3.2 G/DL (ref 3.5–5)
ALBUMIN/GLOB SERPL: 0.9 {RATIO} (ref 1.1–2.2)
ALP SERPL-CCNC: 59 U/L (ref 45–117)
ALT SERPL-CCNC: 32 U/L (ref 12–78)
ANION GAP SERPL CALC-SCNC: 6 MMOL/L (ref 5–15)
AST SERPL-CCNC: 22 U/L (ref 15–37)
BILIRUB SERPL-MCNC: 0.7 MG/DL (ref 0.2–1)
BUN SERPL-MCNC: 17 MG/DL (ref 6–20)
BUN/CREAT SERPL: 13 (ref 12–20)
CALCIUM SERPL-MCNC: 8.5 MG/DL (ref 8.5–10.1)
CHLORIDE SERPL-SCNC: 106 MMOL/L (ref 97–108)
CO2 SERPL-SCNC: 26 MMOL/L (ref 21–32)
CREAT SERPL-MCNC: 1.28 MG/DL (ref 0.7–1.3)
DATE LAST DOSE: NORMAL
ERYTHROCYTE [DISTWIDTH] IN BLOOD BY AUTOMATED COUNT: 13.2 % (ref 11.5–14.5)
GLOBULIN SER CALC-MCNC: 3.7 G/DL (ref 2–4)
GLUCOSE BLD STRIP.AUTO-MCNC: 107 MG/DL (ref 65–100)
GLUCOSE BLD STRIP.AUTO-MCNC: 108 MG/DL (ref 65–100)
GLUCOSE BLD STRIP.AUTO-MCNC: 133 MG/DL (ref 65–100)
GLUCOSE BLD STRIP.AUTO-MCNC: 151 MG/DL (ref 65–100)
GLUCOSE BLD STRIP.AUTO-MCNC: 90 MG/DL (ref 65–100)
GLUCOSE SERPL-MCNC: 120 MG/DL (ref 65–100)
HCT VFR BLD AUTO: 40.2 % (ref 36.6–50.3)
HGB BLD-MCNC: 13.2 G/DL (ref 12.1–17)
MAGNESIUM SERPL-MCNC: 2.6 MG/DL (ref 1.6–2.4)
MCH RBC QN AUTO: 28.9 PG (ref 26–34)
MCHC RBC AUTO-ENTMCNC: 32.8 G/DL (ref 30–36.5)
MCV RBC AUTO: 88 FL (ref 80–99)
NRBC # BLD: 0 K/UL (ref 0–0.01)
NRBC BLD-RTO: 0 PER 100 WBC
PLATELET # BLD AUTO: 169 K/UL (ref 150–400)
PMV BLD AUTO: 9.5 FL (ref 8.9–12.9)
POTASSIUM SERPL-SCNC: 3.9 MMOL/L (ref 3.5–5.1)
PROT SERPL-MCNC: 6.9 G/DL (ref 6.4–8.2)
RBC # BLD AUTO: 4.57 M/UL (ref 4.1–5.7)
REPORTED DOSE,DOSE: NORMAL UNITS
REPORTED DOSE/TIME,TMG: NORMAL
SERVICE CMNT-IMP: ABNORMAL
SERVICE CMNT-IMP: NORMAL
SODIUM SERPL-SCNC: 138 MMOL/L (ref 136–145)
VANCOMYCIN TROUGH SERPL-MCNC: 6.5 UG/ML (ref 5–10)
WBC # BLD AUTO: 10.2 K/UL (ref 4.1–11.1)

## 2019-07-06 PROCEDURE — 74011250636 HC RX REV CODE- 250/636

## 2019-07-06 PROCEDURE — 77030011267 HC ELECTRD BLD COVD -A: Performed by: PODIATRIST

## 2019-07-06 PROCEDURE — 74011000258 HC RX REV CODE- 258: Performed by: PODIATRIST

## 2019-07-06 PROCEDURE — 85027 COMPLETE CBC AUTOMATED: CPT

## 2019-07-06 PROCEDURE — 74011000258 HC RX REV CODE- 258: Performed by: INTERNAL MEDICINE

## 2019-07-06 PROCEDURE — 77030011640 HC PAD GRND REM COVD -A: Performed by: PODIATRIST

## 2019-07-06 PROCEDURE — 82962 GLUCOSE BLOOD TEST: CPT

## 2019-07-06 PROCEDURE — 80202 ASSAY OF VANCOMYCIN: CPT

## 2019-07-06 PROCEDURE — 80053 COMPREHEN METABOLIC PANEL: CPT

## 2019-07-06 PROCEDURE — 77030028224 HC PDNG CST BSNM -A: Performed by: PODIATRIST

## 2019-07-06 PROCEDURE — 73630 X-RAY EXAM OF FOOT: CPT

## 2019-07-06 PROCEDURE — 76010000138 HC OR TIME 0.5 TO 1 HR: Performed by: PODIATRIST

## 2019-07-06 PROCEDURE — 74011250637 HC RX REV CODE- 250/637: Performed by: PODIATRIST

## 2019-07-06 PROCEDURE — 74011250636 HC RX REV CODE- 250/636: Performed by: INTERNAL MEDICINE

## 2019-07-06 PROCEDURE — 65270000029 HC RM PRIVATE

## 2019-07-06 PROCEDURE — 74011000258 HC RX REV CODE- 258

## 2019-07-06 PROCEDURE — 88307 TISSUE EXAM BY PATHOLOGIST: CPT

## 2019-07-06 PROCEDURE — 76210000006 HC OR PH I REC 0.5 TO 1 HR: Performed by: PODIATRIST

## 2019-07-06 PROCEDURE — 74011250636 HC RX REV CODE- 250/636: Performed by: ANESTHESIOLOGY

## 2019-07-06 PROCEDURE — 77030018836 HC SOL IRR NACL ICUM -A: Performed by: PODIATRIST

## 2019-07-06 PROCEDURE — 88311 DECALCIFY TISSUE: CPT

## 2019-07-06 PROCEDURE — 77030031139 HC SUT VCRL2 J&J -A: Performed by: PODIATRIST

## 2019-07-06 PROCEDURE — 83735 ASSAY OF MAGNESIUM: CPT

## 2019-07-06 PROCEDURE — 74011250636 HC RX REV CODE- 250/636: Performed by: PODIATRIST

## 2019-07-06 PROCEDURE — 36415 COLL VENOUS BLD VENIPUNCTURE: CPT

## 2019-07-06 PROCEDURE — 77030002916 HC SUT ETHLN J&J -A: Performed by: PODIATRIST

## 2019-07-06 PROCEDURE — 0QBP0ZZ EXCISION OF LEFT METATARSAL, OPEN APPROACH: ICD-10-PCS | Performed by: PODIATRIST

## 2019-07-06 PROCEDURE — 74011000250 HC RX REV CODE- 250: Performed by: PODIATRIST

## 2019-07-06 PROCEDURE — 76060000032 HC ANESTHESIA 0.5 TO 1 HR: Performed by: PODIATRIST

## 2019-07-06 RX ORDER — PROPOFOL 10 MG/ML
INJECTION, EMULSION INTRAVENOUS AS NEEDED
Status: DISCONTINUED | OUTPATIENT
Start: 2019-07-06 | End: 2019-07-06 | Stop reason: HOSPADM

## 2019-07-06 RX ORDER — LIDOCAINE HYDROCHLORIDE 10 MG/ML
0.1 INJECTION, SOLUTION EPIDURAL; INFILTRATION; INTRACAUDAL; PERINEURAL AS NEEDED
Status: DISCONTINUED | OUTPATIENT
Start: 2019-07-06 | End: 2019-07-06 | Stop reason: HOSPADM

## 2019-07-06 RX ORDER — SODIUM CHLORIDE, SODIUM LACTATE, POTASSIUM CHLORIDE, CALCIUM CHLORIDE 600; 310; 30; 20 MG/100ML; MG/100ML; MG/100ML; MG/100ML
125 INJECTION, SOLUTION INTRAVENOUS CONTINUOUS
Status: DISCONTINUED | OUTPATIENT
Start: 2019-07-06 | End: 2019-07-06 | Stop reason: HOSPADM

## 2019-07-06 RX ORDER — SODIUM CHLORIDE 0.9 % (FLUSH) 0.9 %
5-40 SYRINGE (ML) INJECTION EVERY 8 HOURS
Status: DISCONTINUED | OUTPATIENT
Start: 2019-07-06 | End: 2019-07-06 | Stop reason: HOSPADM

## 2019-07-06 RX ORDER — FENTANYL CITRATE 50 UG/ML
INJECTION, SOLUTION INTRAMUSCULAR; INTRAVENOUS AS NEEDED
Status: DISCONTINUED | OUTPATIENT
Start: 2019-07-06 | End: 2019-07-06 | Stop reason: HOSPADM

## 2019-07-06 RX ORDER — BUPIVACAINE HYDROCHLORIDE 5 MG/ML
INJECTION, SOLUTION EPIDURAL; INTRACAUDAL AS NEEDED
Status: DISCONTINUED | OUTPATIENT
Start: 2019-07-06 | End: 2019-07-06 | Stop reason: HOSPADM

## 2019-07-06 RX ORDER — PROPOFOL 10 MG/ML
INJECTION, EMULSION INTRAVENOUS
Status: DISCONTINUED | OUTPATIENT
Start: 2019-07-06 | End: 2019-07-06 | Stop reason: HOSPADM

## 2019-07-06 RX ORDER — HYDROMORPHONE HYDROCHLORIDE 1 MG/ML
.25-1 INJECTION, SOLUTION INTRAMUSCULAR; INTRAVENOUS; SUBCUTANEOUS
Status: DISCONTINUED | OUTPATIENT
Start: 2019-07-06 | End: 2019-07-06 | Stop reason: HOSPADM

## 2019-07-06 RX ORDER — SODIUM CHLORIDE 0.9 % (FLUSH) 0.9 %
5-40 SYRINGE (ML) INJECTION EVERY 8 HOURS
Status: DISCONTINUED | OUTPATIENT
Start: 2019-07-06 | End: 2019-07-08 | Stop reason: HOSPADM

## 2019-07-06 RX ORDER — DIPHENHYDRAMINE HYDROCHLORIDE 50 MG/ML
12.5 INJECTION, SOLUTION INTRAMUSCULAR; INTRAVENOUS AS NEEDED
Status: DISCONTINUED | OUTPATIENT
Start: 2019-07-06 | End: 2019-07-06 | Stop reason: HOSPADM

## 2019-07-06 RX ORDER — NALOXONE HYDROCHLORIDE 0.4 MG/ML
0.04 INJECTION, SOLUTION INTRAMUSCULAR; INTRAVENOUS; SUBCUTANEOUS
Status: DISCONTINUED | OUTPATIENT
Start: 2019-07-06 | End: 2019-07-06 | Stop reason: HOSPADM

## 2019-07-06 RX ORDER — SODIUM CHLORIDE 0.9 % (FLUSH) 0.9 %
5-40 SYRINGE (ML) INJECTION AS NEEDED
Status: DISCONTINUED | OUTPATIENT
Start: 2019-07-06 | End: 2019-07-06 | Stop reason: HOSPADM

## 2019-07-06 RX ORDER — FLUMAZENIL 0.1 MG/ML
0.2 INJECTION INTRAVENOUS
Status: DISCONTINUED | OUTPATIENT
Start: 2019-07-06 | End: 2019-07-06 | Stop reason: HOSPADM

## 2019-07-06 RX ORDER — MIDAZOLAM HYDROCHLORIDE 1 MG/ML
INJECTION, SOLUTION INTRAMUSCULAR; INTRAVENOUS AS NEEDED
Status: DISCONTINUED | OUTPATIENT
Start: 2019-07-06 | End: 2019-07-06 | Stop reason: HOSPADM

## 2019-07-06 RX ADMIN — MEROPENEM 500 MG: 500 INJECTION, POWDER, FOR SOLUTION INTRAVENOUS at 22:08

## 2019-07-06 RX ADMIN — MEROPENEM 500 MG: 500 INJECTION, POWDER, FOR SOLUTION INTRAVENOUS at 14:17

## 2019-07-06 RX ADMIN — SODIUM CHLORIDE, SODIUM LACTATE, POTASSIUM CHLORIDE, AND CALCIUM CHLORIDE 125 ML/HR: 600; 310; 30; 20 INJECTION, SOLUTION INTRAVENOUS at 08:00

## 2019-07-06 RX ADMIN — PROPOFOL 40 MG: 10 INJECTION, EMULSION INTRAVENOUS at 08:23

## 2019-07-06 RX ADMIN — FENTANYL CITRATE 50 MCG: 50 INJECTION, SOLUTION INTRAMUSCULAR; INTRAVENOUS at 08:14

## 2019-07-06 RX ADMIN — MONTELUKAST SODIUM 10 MG: 10 TABLET, FILM COATED ORAL at 22:08

## 2019-07-06 RX ADMIN — ACETAMINOPHEN 650 MG: 325 TABLET ORAL at 11:58

## 2019-07-06 RX ADMIN — MIDAZOLAM HYDROCHLORIDE 2 MG: 1 INJECTION, SOLUTION INTRAMUSCULAR; INTRAVENOUS at 08:14

## 2019-07-06 RX ADMIN — VANCOMYCIN HYDROCHLORIDE 1750 MG: 10 INJECTION, POWDER, LYOPHILIZED, FOR SOLUTION INTRAVENOUS at 15:50

## 2019-07-06 RX ADMIN — PROPOFOL 20 MG: 10 INJECTION, EMULSION INTRAVENOUS at 08:18

## 2019-07-06 RX ADMIN — ACETAMINOPHEN 650 MG: 325 TABLET ORAL at 15:49

## 2019-07-06 RX ADMIN — PROPOFOL 50 MCG/KG/MIN: 10 INJECTION, EMULSION INTRAVENOUS at 08:16

## 2019-07-06 RX ADMIN — FENTANYL CITRATE 50 MCG: 50 INJECTION, SOLUTION INTRAMUSCULAR; INTRAVENOUS at 08:09

## 2019-07-06 RX ADMIN — MIDAZOLAM HYDROCHLORIDE 2 MG: 1 INJECTION, SOLUTION INTRAMUSCULAR; INTRAVENOUS at 08:09

## 2019-07-06 RX ADMIN — MIDAZOLAM HYDROCHLORIDE 1 MG: 1 INJECTION, SOLUTION INTRAMUSCULAR; INTRAVENOUS at 08:18

## 2019-07-06 RX ADMIN — MEROPENEM 500 MG: 500 INJECTION, POWDER, FOR SOLUTION INTRAVENOUS at 02:04

## 2019-07-06 RX ADMIN — MEROPENEM 500 MG: 500 INJECTION, POWDER, FOR SOLUTION INTRAVENOUS at 08:22

## 2019-07-06 RX ADMIN — Medication 10 ML: at 14:19

## 2019-07-06 RX ADMIN — ROSUVASTATIN CALCIUM 10 MG: 10 TABLET, FILM COATED ORAL at 22:08

## 2019-07-06 RX ADMIN — SODIUM CHLORIDE 100 ML/HR: 900 INJECTION, SOLUTION INTRAVENOUS at 10:30

## 2019-07-06 RX ADMIN — Medication 10 ML: at 05:47

## 2019-07-06 NOTE — BRIEF OP NOTE
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 PARTIAL EXCISION 5TH METATARSAL  2.  PARTIAL EXCISION 5TH TOE PROXIMAL PHALANX  Surgeon(s) and Role:     * Isac Sumner DPM - Primary         Surgical Assistant: Erica Samuel LPN    Surgical Staff:  Circ-1: Zainab Lucas RN  Scrub Tech-1: Mykel Diego  Surg Asst-1: Betsey Shook LPN  Event Time In Time Out   Incision Start 07/06/2019 0828    Incision Close 07/06/2019 0851      Anesthesia: MAC   Estimated Blood Loss: 5mL  Specimens:   ID Type Source Tests Collected by Time Destination   1 : left fifth metatarsal Preservative Bone  Isac Sumner DPM 7/6/2019 1780 Pathology      Findings: No further purulence noted   Complications: none  Implants: * No implants in log *

## 2019-07-06 NOTE — ROUTINE PROCESS
TRANSFER - OUT REPORT:    Verbal report given to [address] on Makayla Sanchez  being transferred to [phone] for routine post - op       Report consisted of patients Situation, Background, Assessment and   Recommendations(SBAR). Information from the following report(s) SBAR and OR Summary was reviewed with the receiving nurse. Lines:   Peripheral IV 07/04/19 Right Forearm (Active)   Site Assessment Clean, dry, & intact 7/6/2019 10:26 AM   Phlebitis Assessment 0 7/6/2019  9:00 AM   Infiltration Assessment 0 7/6/2019  9:00 AM   Dressing Status Clean, dry, & intact 7/6/2019  9:00 AM   Dressing Type Tape;Transparent 7/6/2019  9:00 AM   Hub Color/Line Status Pink; Infusing 7/6/2019  9:00 AM   Action Taken Dressing reinforced; Open ports on tubing capped 7/6/2019  7:56 AM   Alcohol Cap Used Yes 7/6/2019  9:00 AM        Opportunity for questions and clarification was provided.       Patient transported with:   Registered Nurse

## 2019-07-06 NOTE — ANESTHESIA POSTPROCEDURE EVALUATION
Procedure(s):  INCISION AND DRAINAGE LEFT FOOT AND POSSIBLE BONE RESECTION. MAC    Anesthesia Post Evaluation        Patient location during evaluation: bedside  Level of consciousness: awake  Pain management: satisfactory to patient  Airway patency: patent  Anesthetic complications: no  Cardiovascular status: acceptable  Respiratory status: acceptable  Hydration status: acceptable        Vitals Value Taken Time   /77 7/6/2019  9:00 AM   Temp 36.8 °C (98.3 °F) 7/6/2019  9:00 AM   Pulse 86 7/6/2019  9:02 AM   Resp 21 7/6/2019  9:02 AM   SpO2 93 % 7/6/2019  9:02 AM   Vitals shown include unvalidated device data.

## 2019-07-06 NOTE — PROGRESS NOTES
Bedside and Verbal shift change report given to REYES Avalos (oncoming nurse) by Kristie Stark RN  (offgoing nurse). Report included the following information SBAR, Kardex and MAR.

## 2019-07-06 NOTE — PROGRESS NOTES
Daily Progress Note: 7/6/2019  Yessica Ramey MD    Assessment/Plan:   Diabetic foot infection [facility]) (7/4/2019)              - continue vancomycin, Merrem              - procalcitonin neg, patient does not appear septic at this time              - consulted Podiatry - to OR today              - MRI: Acute osteomyelitis of the fifth metatarsal head with surrounding soft tissue  swelling/edema. There is an ulcer noted within the lateral soft tissues of the  foot overlying the fifth metatarsal head.       CAD (coronary artery disease) (3/23/2015)              - stable, continue cardiac meds     ARF -- Cr elevated on admission  --improved       HTN (hypertension) (3/23/2015)              - BP okay, follow on home meds except ACE and diuretic in light of elevated creatinine       MING (obstructive sleep apnea) (3/23/2015)              - uses CPAP at home, will try to have family bring in machine, thinks it is 16 cm H2O,               CPAP ordered for use here       Obesity (3/23/2015), morbid              - counseled on weight loss        DM type 2 with diabetic peripheral neuropathy ([facility] [address] (7/4/2019)              - BS okay here on current meds.    Home control not ideal A1c at 8.5%       Code status:              - patient is FULL CODE, no AMD on file, wife Chaka is surrogate               Problem List:  Problem List as of 7/6/2019 Date Reviewed: 7/5/2019          Codes Class Noted - Resolved    * (Principal) Diabetic foot infection ([facility] ICD-10-CM: E11.628, L08.9  ICD-9-CM: 250.80, 686.9  7/4/2019 - Present        Elevated serum creatinine ICD-10-CM: R79.89  ICD-9-CM: 790.99  7/4/2019 - Present        DM type 2 with diabetic peripheral neuropathy (HCC) (Chronic) ICD-10-CM: E11.42  ICD-9-CM: 250.60, 357.2  7/4/2019 - Present        S/P CABG x 4 (Chronic) ICD-10-CM: Z95.1  ICD-9-CM: V45.81  3/25/2015 - Present        CAD (coronary artery disease) (Chronic) ICD-10-CM: I25.10  ICD-9-CM: 414.00 3/23/2015 - Present    Overview Signed 3/23/2015 12:05 PM by Lui Alvarado     ECHO: 3/21/2015: EF 55-60%, no WMA, mild LVH, MAC, no MR   CATH: 3/23/2015: Obstructive 3VD:dLM:80%, mLAD:80%, dLAD:70%, D1:90% mLCX: 90%, mRCA:50%, PDA: 80%, EF 55%, Focal mid inferior AK. HTN (hypertension) (Chronic) ICD-10-CM: I10  ICD-9-CM: 401.9  3/23/2015 - Present        MING (obstructive sleep apnea) (Chronic) ICD-10-CM: G47.33  ICD-9-CM: 327.23  3/23/2015 - Present        Obesity (Chronic) ICD-10-CM: E66.9  ICD-9-CM: 278.00  3/23/2015 - Present        RESOLVED: Paronychia of great toe of right foot ICD-10-CM: L03.031  ICD-9-CM: 681.11  3/23/2015 - 7/4/2019        RESOLVED: Unstable angina (HCC) ICD-10-CM: I20.0  ICD-9-CM: 411.1  3/21/2015 - 7/4/2019        RESOLVED: NSTEMI (non-ST elevated myocardial infarction) ([facility] ICD-10-CM: I21.4  ICD-9-CM: 410.70  3/21/2015 - 7/4/2019              Subjective:    80 y.o.  male who is admitted with diabetic foot infection. Mr. Tiffani Estrella presented to the Emergency Department this PM complaining of swollen foot: left side, constant, for the past day or so, associated with redness and fever; has had foot sores for the past 4+ weeks and has been self-treating at home without improvement  History obtained from chart review and the patient. Previous records reviewed: admit here in 2015 with NSTEMI that led to transfer for [facility] [place] and eventual CABG for CAD, outpatient follow up with Cardiology (Dr Jamila Coates)    7/5:  Little pain in foot. He thinks there is less redness. No other complaints. Reports glucoses have been under good control outpt. Discussed meds and which ones should be held with elevated Creat. Creat improved from yesterday - cont to monitor. Podiatry to see today - further imaging per podiatry. 7/6: No acute events overnight. He feels well. No pain in the foot.  Denies cp, sob, dizziness, HA.     Review of Systems:   A comprehensive review of systems was negative except for that written in the HPI. Objective:   Physical Exam:   Visit Vitals  /77 (BP 1 Location: Left arm, BP Patient Position: At rest)   Pulse 98   Temp 98.9 °F (37.2 °C)   Resp 18   Ht 5' 5\" (1.651 m)   Wt 112.1 kg (247 lb 1.6 oz)   SpO2 94%   BMI 41.12 kg/m²      O2 Device: Room air    Temp (24hrs), Av.9 °F (37.2 °C), Min:98 °F (36.7 °C), Max:99.8 °F (37.7 °C)    1901 - 700  In: 50 [I.V.:50]  Out: -    701 - 1900  In: 6919 [I.V.:1495]  Out: -   General:  Alert, morbidly obese, cooperative, no distress, appears stated age. Head:  Normocephalic, without obvious abnormality, atraumatic. Eyes:  Conjunctivae/corneas clear. PERRL, EOMs intact. Lungs:   Clear to auscultation bilaterally. Heart:  Regular rate and rhythm, S1, S2 normal, no murmur, click, rub or gallop. Abdomen:   Soft, non-tender. Bowel sounds normal. No masses,  No organomegaly. Extremities: no cyanosis. Some edema/swelling present in left foot/ankle. No calf tenderness or cords. Pulses: 2+ and symmetric all extremities. Skin: Redness and swollen lateral left foot, dorsum, and toes. Small ulcer lateral dorsum and another larger, 1.2cm diam, lateral approx over 5th MTP.   turgor normal.    Neurologic: CNII-XII intact. Alert and oriented X 3. Data Review:       Recent Days:  Recent Labs     19  0203 19  0248 19  1509   WBC 10.2 12.4* 13.4*   HGB 13.2 13.7 13.9   HCT 40.2 42.5 42.1    187 179     Recent Labs     19  0203 19  0248 19  1509    139 136   K 3.9 3.6 5.0    105 101   CO2 26 27 30   * 73 100   BUN 17 24* 26*   CREA 1.28 1.47* 1.70*   CA 8.5 8.7 8.9   MG 2.6* 2.7*  --    PHOS  --  3.2  --    ALB 3.2*  --  3.9   TBILI 0.7  --  0.9   SGOT 22  --  43*   ALT 32  --  46     No results for input(s): PH, PCO2, PO2, HCO3, FIO2 in the last 72 hours.     24 Hour Results:  Recent Results (from the past 24 hour(s)) GLUCOSE, POC    Collection Time: 07/05/19  7:04 AM   Result Value Ref Range    Glucose (POC) 83 65 - 100 mg/dL    Performed by Ema Tovar (PCT)    GLUCOSE, POC    Collection Time: 07/05/19 11:52 AM   Result Value Ref Range    Glucose (POC) 108 (H) 65 - 100 mg/dL    Performed by Armando Billings (PCT)    CULTURE, WOUND [place]  STAIN    Collection Time: 07/05/19  4:11 PM   Result Value Ref Range    Special Requests: NO SPECIAL REQUESTS      GRAM STAIN RARE WBCS SEEN      GRAM STAIN OCCASIONAL GRAM POSITIVE COCCI IN PAIRS      Culture result: PENDING    GLUCOSE, POC    Collection Time: 07/05/19  4:45 PM   Result Value Ref Range    Glucose (POC) 129 (H) 65 - 100 mg/dL    Performed by Armando Billings (PCT)    GLUCOSE, POC    Collection Time: 07/05/19  9:23 PM   Result Value Ref Range    Glucose (POC) 108 (H) 65 - 100 mg/dL    Performed by Robert Guzmán (PCT)    METABOLIC PANEL, COMPREHENSIVE    Collection Time: 07/06/19  2:03 AM   Result Value Ref Range    Sodium 138 136 - 145 mmol/L    Potassium 3.9 3.5 - 5.1 mmol/L    Chloride 106 97 - 108 mmol/L    CO2 26 21 - 32 mmol/L    Anion gap 6 5 - 15 mmol/L    Glucose 120 (H) 65 - 100 mg/dL    BUN 17 6 - 20 MG/DL    Creatinine 1.28 0.70 - 1.30 MG/DL    BUN/Creatinine ratio 13 12 - 20      GFR est AA >60 >60 ml/min/1.73m2    GFR est non-AA 56 (L) >60 ml/min/1.73m2    Calcium 8.5 8.5 - 10.1 MG/DL    Bilirubin, total 0.7 0.2 - 1.0 MG/DL    ALT (SGPT) 32 12 - 78 U/L    AST (SGOT) 22 15 - 37 U/L    Alk.  phosphatase 59 45 - 117 U/L    Protein, total 6.9 6.4 - 8.2 g/dL    Albumin 3.2 (L) 3.5 - 5.0 g/dL    Globulin 3.7 2.0 - 4.0 g/dL    A-G Ratio 0.9 (L) 1.1 - 2.2     CBC W/O DIFF    Collection Time: 07/06/19  2:03 AM   Result Value Ref Range    WBC 10.2 4.1 - 11.1 K/uL    RBC 4.57 4.10 - 5.70 M/uL    HGB 13.2 12.1 - 17.0 g/dL    HCT 40.2 36.6 - 50.3 %    MCV 88.0 80.0 - 99.0 FL    MCH 28.9 26.0 - 34.0 PG    MCHC 32.8 30.0 - 36.5 g/dL    RDW 13.2 11.5 - 14.5 %    PLATELET 453 150 - 400 K/uL    MPV 9.5 8.9 - 12.9 FL    NRBC 0.0 0  WBC    ABSOLUTE NRBC 0.00 0.00 - 0.01 K/uL   MAGNESIUM    Collection Time: 07/06/19  2:03 AM   Result Value Ref Range    Magnesium 2.6 (H) 1.6 - 2.4 mg/dL       Medications reviewed  Current Facility-Administered Medications   Medication Dose Route Frequency    rosuvastatin (CRESTOR) tablet 10 mg  10 mg Oral QHS    montelukast (SINGULAIR) tablet 10 mg  10 mg Oral QHS    dapagliflozin (FARXIGA) tablet 10 mg (Patient Supplied)  10 mg Oral DAILY    liraglutide (VICTOZA) 0.6 mg/0.1 mL (18 mg/3 mL) sub-q pen 1.8 mg (Patient Supplied)  1.8 mg SubCUTAneous QHS    pantoprazole (PROTONIX) tablet 40 mg  40 mg Oral ACB    Vancomycin - Please draw trough level prior to dose due on 7/6 at 1700. Thanks!    Other ONCE    sodium chloride (NS) flush 5-10 mL  5-10 mL IntraVENous PRN    vancomycin (VANCOCIN) 1,750 mg in 0.9% sodium chloride 500 mL IVPB  1,750 mg IntraVENous Q24H    meropenem (MERREM) 500 mg in 0.9% sodium chloride (MBP/ADV) 50 mL  0.5 g IntraVENous Q6H    0.9% sodium chloride infusion  100 mL/hr IntraVENous CONTINUOUS    sodium chloride (NS) flush 5-40 mL  5-40 mL IntraVENous Q8H    sodium chloride (NS) flush 5-40 mL  5-40 mL IntraVENous PRN    acetaminophen (TYLENOL) tablet 650 mg  650 mg Oral Q4H PRN    oxyCODONE-acetaminophen (PERCOCET) 5-325 mg per tablet 1 Tab  1 Tab Oral Q4H PRN    HYDROmorphone (DILAUDID) syringe 0.5 mg  0.5 mg IntraVENous Q4H PRN    prochlorperazine (COMPAZINE) injection 10 mg  10 mg IntraVENous Q6H PRN    insulin lispro (HUMALOG) injection   SubCUTAneous AC&HS    glucose chewable tablet 16 g  4 Tab Oral PRN    dextrose (D50W) injection syrg 12.5-25 g  25-50 mL IntraVENous PRN    glucagon (GLUCAGEN) injection 1 mg  1 mg IntraMUSCular PRN    melatonin tablet 3 mg  3 mg Oral QHS PRN       Care Plan discussed with: Patient/Family and Nurse      Divya Lino MD

## 2019-07-06 NOTE — PROGRESS NOTES
Patient was taken down to OR. Merrem given to OR RN. Will assess patient when patient comes back up to the floor.

## 2019-07-06 NOTE — PROGRESS NOTES
[facility] Dosing Services: Antimicrobial Stewardship Daily Doc    Consult for antibiotic dosing of vancomycin by Dr. Claudeen Hsu (attending Dr. Leobardo Chandler)  Indication:diabetic foot/osteo of 5th metatarsal head (osteo confirmed via MRI)  Day of Therapy 3  POD0 of partial excision of 5th metatarsal    Ht Readings from Last 1 Encounters:   07/04/19 165.1 cm (65\")        Wt Readings from Last 1 Encounters:   07/05/19 112.1 kg (247 lb 1.6 oz)        Vancomycin therapy:  Current maintenance dose: 1750 mg every 24 hours. Dose calculated to approximate a therapeutic trough of 15-20 mcg/mL. Plan for level / Adjustment in Therapy: trough prior to the third total dose was 6.5 mcg/ml. Due to patient's TIMUR improving and the assumption that he will reach his baseline scr soon, will adjust vancomycin to 1500 mg IV q 12 hours. Plan for trough prior to the 4th dose of new regimen     Date Dose & Interval Measured (mcg/mL) Extrapolated (mcg/mL)   ?7/6 ?1750 q 24 hours  ?prior to third=6.5 mcg/ml ? ? ? ? ?   ? ? ? ? Other Antimicrobial   (not dosed by pharmacist) Meropenem 500mg IV every 6 hours   Cultures 7/4 Blood: NGTD X 2d (prelim)  7/5 Blood: NGTD X 1d (prelim)  7/5 Foot wound: occasional GPC in pairs, moderate possible staph aureus (prelim)     Serum Creatinine Lab Results   Component Value Date/Time    Creatinine 1.28 07/06/2019 02:03 AM    Creatinine (POC) 1.0 03/21/2015 09:39 AM         Creatinine Clearance Estimated Creatinine Clearance: 64.7 mL/min (based on SCr of 1.28 mg/dL).      Temp Temp: 98.4 °F (36.9 °C)       WBC Lab Results   Component Value Date/Time    WBC 10.2 07/06/2019 02:03 AM        H/H Lab Results   Component Value Date/Time    HGB 13.2 07/06/2019 02:03 AM        Platelets    Lab Results   Component Value Date/Time    PLATELET 013 29/55/4855 02:03 AM              Pharmacist ARVIN MendosaD Contact information: [phone]

## 2019-07-06 NOTE — OP NOTES
Operative Report    Patient: Marie Lambert MRN: [id number]  SSN: [id number]    YOB: 1950  Age: 80 y.o. Sex: male       Date of Surgery: 7/6/2019     Preoperative Diagnosis:   1. OSTEOMYELITIS, LEFT 5TH METATARSAL  2. OSTEOMYELITIS, LEFT 5TH TOE PROXIMAL PHALANX BASE     Postoperative Diagnosis:   1. OSTEOMYELITIS, LEFT 5TH METATARSAL  2. OSTEOMYELITIS, LEFT 5TH TOE PROXIMAL PHALANX BASE      Surgeon(s) and Role:     * Darryl Sumner DPM - Primary    Anesthesia: MAC     Procedure: Procedure(s):  1. PARTIAL EXCISION OF BONE LEFT 5TH METATARSAL  2. PARTIAL EXCISION OF BONE LEFT 5TH TOE PROXIMAL PHALANX    Justification of Procedure: Patient is a 81 y/o male diabetic patient admitted for infection and cellulitis of the left foot. Yesterday I performed a bedside I&D. This is a staged intervention for limb salvage. Radiographic and MRI imaging shows osteomyelitis of the 5th metatarsal and base of proximal phalanx of the left 5th toe secondary to long standing ulceration to the lateral left foot. I recommended surgical resection of the infection which may require staged intervention and further intervention. I discussed the procedure at length including the expected post operative course, risks, alternatives, and possible complications. No guarantees were given and the patient elected to proceed. Procedure in Detail: This 28-year-old male was brought to the operating room and placed supine on the operating room table. After adequate IV sedation a local infiltrated block was performed to the left foot using 0.5% Marcaine plane. Next the left foot was prepped and draped in usual aseptic fashion. Attention was then directed to the left foot where there was an ulceration noted to the lateral fifth metatarsal head. This ulceration was excised using sharp dissection and passed from the field.  Next soft tissue attachments were dissected free from the lateral aspect of the fifth metatarsal distal one third as well as the base of the proximal phalanx. Soft tissue is passed from the field. Next the distal 1/3 of the 5th metatarsal was resected using power saw. This bone was then sent for pathology and microbiology. Next attention was directed to the base of the proximal phalanx. Soft tissue attachments were dissected free in the base of the proximal phalanx was resected using power saw and passed from the field. The wound was then irrigated with copious amounts of normal saline and there was no evidence of any further purulence or abscess. Next, the wound was closed using 3-0 nylon and simple fashion. Postoperative dressing applied using Betadine soaked Adaptic, dry gauze, cast padding, coban and ace bandage. Patient tolerated procedure and anesthesia well and was transferred to the PACU for postoperative monitoring. From there he will be transferred to the floor for further medical and surgical management. Estimated Blood Loss:  5mL    Tourniquet Time: * No tourniquets in log *      Implants: * No implants in log *            Specimens:   ID Type Source Tests Collected by Time Destination   1 : left fifth metatarsal Preservative Bone  Karen Sumner DPM 7/6/2019 8713 Pathology           Drains: None                Complications: None    Counts: Sponge and needle counts were correct times two.     Signed By:  Sha Whelan DPM     July 6, 2019

## 2019-07-07 LAB
ALBUMIN SERPL-MCNC: 3.1 G/DL (ref 3.5–5)
ALBUMIN/GLOB SERPL: 0.8 {RATIO} (ref 1.1–2.2)
ALP SERPL-CCNC: 64 U/L (ref 45–117)
ALT SERPL-CCNC: 30 U/L (ref 12–78)
ANION GAP SERPL CALC-SCNC: 6 MMOL/L (ref 5–15)
AST SERPL-CCNC: 23 U/L (ref 15–37)
BACTERIA SPEC CULT: ABNORMAL
BILIRUB SERPL-MCNC: 0.7 MG/DL (ref 0.2–1)
BUN SERPL-MCNC: 15 MG/DL (ref 6–20)
BUN/CREAT SERPL: 13 (ref 12–20)
CALCIUM SERPL-MCNC: 8.5 MG/DL (ref 8.5–10.1)
CHLORIDE SERPL-SCNC: 103 MMOL/L (ref 97–108)
CO2 SERPL-SCNC: 28 MMOL/L (ref 21–32)
CREAT SERPL-MCNC: 1.14 MG/DL (ref 0.7–1.3)
ERYTHROCYTE [DISTWIDTH] IN BLOOD BY AUTOMATED COUNT: 13.2 % (ref 11.5–14.5)
GLOBULIN SER CALC-MCNC: 3.8 G/DL (ref 2–4)
GLUCOSE BLD STRIP.AUTO-MCNC: 124 MG/DL (ref 65–100)
GLUCOSE BLD STRIP.AUTO-MCNC: 138 MG/DL (ref 65–100)
GLUCOSE BLD STRIP.AUTO-MCNC: 158 MG/DL (ref 65–100)
GLUCOSE BLD STRIP.AUTO-MCNC: 182 MG/DL (ref 65–100)
GLUCOSE SERPL-MCNC: 124 MG/DL (ref 65–100)
GRAM STN SPEC: ABNORMAL
GRAM STN SPEC: ABNORMAL
HCT VFR BLD AUTO: 38.9 % (ref 36.6–50.3)
HGB BLD-MCNC: 12.9 G/DL (ref 12.1–17)
MAGNESIUM SERPL-MCNC: 2.4 MG/DL (ref 1.6–2.4)
MCH RBC QN AUTO: 29.1 PG (ref 26–34)
MCHC RBC AUTO-ENTMCNC: 33.2 G/DL (ref 30–36.5)
MCV RBC AUTO: 87.6 FL (ref 80–99)
NRBC # BLD: 0 K/UL (ref 0–0.01)
NRBC BLD-RTO: 0 PER 100 WBC
PLATELET # BLD AUTO: 185 K/UL (ref 150–400)
PMV BLD AUTO: 9.6 FL (ref 8.9–12.9)
POTASSIUM SERPL-SCNC: 4.1 MMOL/L (ref 3.5–5.1)
PROT SERPL-MCNC: 6.9 G/DL (ref 6.4–8.2)
RBC # BLD AUTO: 4.44 M/UL (ref 4.1–5.7)
SERVICE CMNT-IMP: ABNORMAL
SODIUM SERPL-SCNC: 137 MMOL/L (ref 136–145)
WBC # BLD AUTO: 10.8 K/UL (ref 4.1–11.1)

## 2019-07-07 PROCEDURE — 74011000258 HC RX REV CODE- 258: Performed by: PODIATRIST

## 2019-07-07 PROCEDURE — 74011636637 HC RX REV CODE- 636/637: Performed by: PODIATRIST

## 2019-07-07 PROCEDURE — 74011250636 HC RX REV CODE- 250/636: Performed by: PODIATRIST

## 2019-07-07 PROCEDURE — 85027 COMPLETE CBC AUTOMATED: CPT

## 2019-07-07 PROCEDURE — 36415 COLL VENOUS BLD VENIPUNCTURE: CPT

## 2019-07-07 PROCEDURE — 65270000029 HC RM PRIVATE

## 2019-07-07 PROCEDURE — 82962 GLUCOSE BLOOD TEST: CPT

## 2019-07-07 PROCEDURE — 74011250637 HC RX REV CODE- 250/637: Performed by: PODIATRIST

## 2019-07-07 PROCEDURE — 80053 COMPREHEN METABOLIC PANEL: CPT

## 2019-07-07 PROCEDURE — 74011250636 HC RX REV CODE- 250/636: Performed by: INTERNAL MEDICINE

## 2019-07-07 PROCEDURE — 74011250637 HC RX REV CODE- 250/637: Performed by: FAMILY MEDICINE

## 2019-07-07 PROCEDURE — 83735 ASSAY OF MAGNESIUM: CPT

## 2019-07-07 RX ORDER — LISINOPRIL 20 MG/1
20 TABLET ORAL DAILY
Status: DISCONTINUED | OUTPATIENT
Start: 2019-07-07 | End: 2019-07-08 | Stop reason: HOSPADM

## 2019-07-07 RX ADMIN — SODIUM CHLORIDE 100 ML/HR: 900 INJECTION, SOLUTION INTRAVENOUS at 15:49

## 2019-07-07 RX ADMIN — SODIUM CHLORIDE 100 ML/HR: 900 INJECTION, SOLUTION INTRAVENOUS at 00:48

## 2019-07-07 RX ADMIN — MONTELUKAST SODIUM 10 MG: 10 TABLET, FILM COATED ORAL at 23:17

## 2019-07-07 RX ADMIN — ACETAMINOPHEN 650 MG: 325 TABLET ORAL at 19:44

## 2019-07-07 RX ADMIN — ACETAMINOPHEN 650 MG: 325 TABLET ORAL at 14:15

## 2019-07-07 RX ADMIN — MEROPENEM 500 MG: 500 INJECTION, POWDER, FOR SOLUTION INTRAVENOUS at 03:19

## 2019-07-07 RX ADMIN — VANCOMYCIN HYDROCHLORIDE 1500 MG: 10 INJECTION, POWDER, LYOPHILIZED, FOR SOLUTION INTRAVENOUS at 14:08

## 2019-07-07 RX ADMIN — LISINOPRIL 20 MG: 20 TABLET ORAL at 09:16

## 2019-07-07 RX ADMIN — VANCOMYCIN HYDROCHLORIDE 1500 MG: 10 INJECTION, POWDER, LYOPHILIZED, FOR SOLUTION INTRAVENOUS at 03:22

## 2019-07-07 RX ADMIN — INSULIN LISPRO 2 UNITS: 100 INJECTION, SOLUTION INTRAVENOUS; SUBCUTANEOUS at 16:04

## 2019-07-07 RX ADMIN — PANTOPRAZOLE SODIUM 40 MG: 40 TABLET, DELAYED RELEASE ORAL at 06:37

## 2019-07-07 RX ADMIN — MEROPENEM 500 MG: 500 INJECTION, POWDER, FOR SOLUTION INTRAVENOUS at 13:04

## 2019-07-07 RX ADMIN — Medication 10 ML: at 14:09

## 2019-07-07 RX ADMIN — Medication 10 ML: at 23:17

## 2019-07-07 RX ADMIN — ROSUVASTATIN CALCIUM 10 MG: 10 TABLET, FILM COATED ORAL at 23:17

## 2019-07-07 RX ADMIN — MEROPENEM 500 MG: 500 INJECTION, POWDER, FOR SOLUTION INTRAVENOUS at 19:44

## 2019-07-07 RX ADMIN — ACETAMINOPHEN 650 MG: 325 TABLET ORAL at 00:56

## 2019-07-07 RX ADMIN — MEROPENEM 500 MG: 500 INJECTION, POWDER, FOR SOLUTION INTRAVENOUS at 08:04

## 2019-07-07 NOTE — PROGRESS NOTES
Daily Progress Note: 7/7/2019  Yessica Hernandez MD    Assessment/Plan:   Diabetic foot infection [facility]) (7/4/2019)              - continue vancomycin, Merrem - culture with Staph aureus              - procalcitonin neg, patient does not appear septic at this time              - MRI: Acute osteomyelitis of the fifth metatarsal head with surrounding soft tissue  swelling/edema. There is an ulcer noted within the lateral soft tissues of the  foot overlying the fifth metatarsal head. __POD #1   1. PARTIAL EXCISION 5TH METATARSAL  2. PARTIAL EXCISION 5TH TOE PROXIMAL PHALANX       CAD (coronary artery disease) (3/23/2015)              - stable, continue cardiac meds     ARF -- Cr elevated on admission  --improved       HTN (hypertension) (3/23/2015)              - BP high -- re-start acei, holding hctz       MING (obstructive sleep apnea) (3/23/2015)              - uses CPAP at home, will try to have family bring in machine, thinks it is 16 cm H2O,               CPAP ordered for use here       Obesity (3/23/2015), morbid              - counseled on weight loss        DM type 2 with diabetic peripheral neuropathy ([facility] (7/4/2019)              - BS okay here on current meds.    Home control not ideal A1c at 8.5%       Code status:              - patient is FULL CODE, no AMD on file, wife Chaka is surrogate               Problem List:  Problem List as of 7/7/2019 Date Reviewed: 7/5/2019          Codes Class Noted - Resolved    * (Principal) Diabetic foot infection ([facility] ICD-10-CM: E11.628, L08.9  ICD-9-CM: 250.80, 686.9  7/4/2019 - Present        Elevated serum creatinine ICD-10-CM: R79.89  ICD-9-CM: 790.99  7/4/2019 - Present        DM type 2 with diabetic peripheral neuropathy (HCC) (Chronic) ICD-10-CM: E11.42  ICD-9-CM: 250.60, 357.2  7/4/2019 - Present        S/P CABG x 4 (Chronic) ICD-10-CM: Z95.1  ICD-9-CM: V45.81  3/25/2015 - Present        CAD (coronary artery disease) (Chronic) ICD-10-CM: I25.10  ICD-9-CM: 414.00  3/23/2015 - Present    Overview Signed 3/23/2015 12:05 PM by Karina Guadalupe     ECHO: 3/21/2015: EF 55-60%, no WMA, mild LVH, MAC, no MR   CATH: 3/23/2015: Obstructive 3VD:dLM:80%, mLAD:80%, dLAD:70%, D1:90% mLCX: 90%, mRCA:50%, PDA: 80%, EF 55%, Focal mid inferior AK. HTN (hypertension) (Chronic) ICD-10-CM: I10  ICD-9-CM: 401.9  3/23/2015 - Present        MING (obstructive sleep apnea) (Chronic) ICD-10-CM: G47.33  ICD-9-CM: 327.23  3/23/2015 - Present        Obesity (Chronic) ICD-10-CM: E66.9  ICD-9-CM: 278.00  3/23/2015 - Present        RESOLVED: Paronychia of great toe of right foot ICD-10-CM: L03.031  ICD-9-CM: 681.11  3/23/2015 - 7/4/2019        RESOLVED: Unstable angina (HCC) ICD-10-CM: I20.0  ICD-9-CM: 411.1  3/21/2015 - 7/4/2019        RESOLVED: NSTEMI (non-ST elevated myocardial infarction) ([facility] ICD-10-CM: I21.4  ICD-9-CM: 410.70  3/21/2015 - 7/4/2019              Subjective:    80 y.o.  male who is admitted with diabetic foot infection. Mr. Hugo Nick presented to the Emergency Department this PM complaining of swollen foot: left side, constant, for the past day or so, associated with redness and fever; has had foot sores for the past 4+ weeks and has been self-treating at home without improvement  History obtained from chart review and the patient. Previous records reviewed: admit here in 2015 with NSTEMI that led to transfer for [facility] and eventual CABG for CAD, outpatient follow up with Cardiology (Dr Patricia Saenz)    7/5:  Little pain in foot. He thinks there is less redness. No other complaints. Reports glucoses have been under good control outpt. Discussed meds and which ones should be held with elevated Creat. Creat improved from yesterday - cont to monitor. Podiatry to see today - further imaging per podiatry. 7/6: No acute events overnight. He feels well. No pain in the foot. Denies cp, sob, dizziness, HA.    7/7: OR yesterday.  No acute events overnight. He feels well. Tolerating po. Bowels working. No pain.     Review of Systems:   A comprehensive review of systems was negative except for that written in the HPI. Objective:   Physical Exam:   Visit Vitals  /84 (BP 1 Location: Right arm, BP Patient Position: At rest)   Pulse 79   Temp 98 °F (36.7 °C)   Resp 20   Ht 5' 5\" (1.651 m)   Wt 112.1 kg (247 lb 1.6 oz)   SpO2 95%   BMI 41.12 kg/m²    O2 Flow Rate (L/min): 4 l/min O2 Device: CPAP mask    Temp (24hrs), Av.2 °F (36.8 °C), Min:96.7 °F (35.9 °C), Max:99.3 °F (37.4 °C)    No intake/output data recorded.  1901 -  0700  In: 2300 [I.V.:2300]  Out: -   General:  Alert, morbidly obese, cooperative, no distress, appears stated age. Head:  Normocephalic, without obvious abnormality, atraumatic. Eyes:  Conjunctivae/corneas clear. PERRL, EOMs intact. Lungs:   Clear to auscultation bilaterally. Heart:  Regular rate and rhythm, S1, S2 normal, no murmur, click, rub or gallop. Abdomen:   Soft, non-tender. Bowel sounds normal. No masses,  No organomegaly. Extremities: no cyanosis. Some edema/swelling present in left foot/ankle. No calf tenderness or cords. Pulses: 2+ and symmetric all extremities. Skin: Left foot wrapped   Neurologic: CNII-XII intact. Alert and oriented X 3. Data Review:       Recent Days:  Recent Labs     19  01019  0203 19  0248   WBC 10.8 10.2 12.4*   HGB 12.9 13.2 13.7   HCT 38.9 40.2 42.5    169 187     Recent Labs     19  01019  0203 19  0248 19  1509    138 139 136   K 4.1 3.9 3.6 5.0    106 105 101   CO2 28 26 27 30   * 120* 73 100   BUN 15 17 24* 26*   CREA 1.14 1.28 1.47* 1.70*   CA 8.5 8.5 8.7 8.9   MG 2.4 2.6* 2.7*  --    PHOS  --   --  3.2  --    ALB 3.1* 3.2*  --  3.9   TBILI 0.7 0.7  --  0.9   SGOT 23 22  --  43*   ALT 30 32  --  46     No results for input(s): PH, PCO2, PO2, HCO3, FIO2 in the last 72 hours.     24 Hour Results:  Recent Results (from the past 24 hour(s))   GLUCOSE, POC    Collection Time: 07/06/19  9:34 AM   Result Value Ref Range    Glucose (POC) 107 (H) 65 - 100 mg/dL    Performed by Malathi Adam, POC    Collection Time: 07/06/19 11:21 AM   Result Value Ref Range    Glucose (POC) 90 65 - 100 mg/dL    Performed by Paulino Remy (PCT)    Erika Rival, TROUGH    Collection Time: 07/06/19  3:31 PM   Result Value Ref Range    Vancomycin,trough 6.5 5.0 - 10.0 ug/mL    Reported dose date: NOT PROVIDED      Reported dose time: NOT PROVIDED      Reported dose: NOT PROVIDED UNITS   GLUCOSE, POC    Collection Time: 07/06/19  4:03 PM   Result Value Ref Range    Glucose (POC) 133 (H) 65 - 100 mg/dL    Performed by Paulino Remy (PCT)    GLUCOSE, POC    Collection Time: 07/06/19  9:26 PM   Result Value Ref Range    Glucose (POC) 151 (H) 65 - 100 mg/dL    Performed by REYES DARIUS (PCT)    METABOLIC PANEL, COMPREHENSIVE    Collection Time: 07/07/19  1:01 AM   Result Value Ref Range    Sodium 137 136 - 145 mmol/L    Potassium 4.1 3.5 - 5.1 mmol/L    Chloride 103 97 - 108 mmol/L    CO2 28 21 - 32 mmol/L    Anion gap 6 5 - 15 mmol/L    Glucose 124 (H) 65 - 100 mg/dL    BUN 15 6 - 20 MG/DL    Creatinine 1.14 0.70 - 1.30 MG/DL    BUN/Creatinine ratio 13 12 - 20      GFR est AA >60 >60 ml/min/1.73m2    GFR est non-AA >60 >60 ml/min/1.73m2    Calcium 8.5 8.5 - 10.1 MG/DL    Bilirubin, total 0.7 0.2 - 1.0 MG/DL    ALT (SGPT) 30 12 - 78 U/L    AST (SGOT) 23 15 - 37 U/L    Alk.  phosphatase 64 45 - 117 U/L    Protein, total 6.9 6.4 - 8.2 g/dL    Albumin 3.1 (L) 3.5 - 5.0 g/dL    Globulin 3.8 2.0 - 4.0 g/dL    A-G Ratio 0.8 (L) 1.1 - 2.2     CBC W/O DIFF    Collection Time: 07/07/19  1:01 AM   Result Value Ref Range    WBC 10.8 4.1 - 11.1 K/uL    RBC 4.44 4.10 - 5.70 M/uL    HGB 12.9 12.1 - 17.0 g/dL    HCT 38.9 36.6 - 50.3 %    MCV 87.6 80.0 - 99.0 FL    MCH 29.1 26.0 - 34.0 PG    MCHC 33.2 30.0 - 36.5 g/dL    RDW 13.2 11.5 - 14.5 %    PLATELET 130 313 - 782 K/uL    MPV 9.6 8.9 - 12.9 FL    NRBC 0.0 0  WBC    ABSOLUTE NRBC 0.00 0.00 - 0.01 K/uL   MAGNESIUM    Collection Time: 07/07/19  1:01 AM   Result Value Ref Range    Magnesium 2.4 1.6 - 2.4 mg/dL   GLUCOSE, POC    Collection Time: 07/07/19  6:59 AM   Result Value Ref Range    Glucose (POC) 124 (H) 65 - 100 mg/dL    Performed by Jersey Dasilva        Medications reviewed  Current Facility-Administered Medications   Medication Dose Route Frequency    sodium chloride (NS) flush 5-40 mL  5-40 mL IntraVENous Q8H    vancomycin (VANCOCIN) 1,500 mg in 0.9% sodium chloride 500 mL IVPB  1,500 mg IntraVENous Q12H    rosuvastatin (CRESTOR) tablet 10 mg  10 mg Oral QHS    montelukast (SINGULAIR) tablet 10 mg  10 mg Oral QHS    dapagliflozin (FARXIGA) tablet 10 mg (Patient Supplied)  10 mg Oral DAILY    liraglutide (VICTOZA) 0.6 mg/0.1 mL (18 mg/3 mL) sub-q pen 1.8 mg (Patient Supplied)  1.8 mg SubCUTAneous QHS    pantoprazole (PROTONIX) tablet 40 mg  40 mg Oral ACB    sodium chloride (NS) flush 5-10 mL  5-10 mL IntraVENous PRN    meropenem (MERREM) 500 mg in 0.9% sodium chloride (MBP/ADV) 50 mL  0.5 g IntraVENous Q6H    0.9% sodium chloride infusion  100 mL/hr IntraVENous CONTINUOUS    sodium chloride (NS) flush 5-40 mL  5-40 mL IntraVENous PRN    acetaminophen (TYLENOL) tablet 650 mg  650 mg Oral Q4H PRN    oxyCODONE-acetaminophen (PERCOCET) 5-325 mg per tablet 1 Tab  1 Tab Oral Q4H PRN    HYDROmorphone (DILAUDID) syringe 0.5 mg  0.5 mg IntraVENous Q4H PRN    prochlorperazine (COMPAZINE) injection 10 mg  10 mg IntraVENous Q6H PRN    insulin lispro (HUMALOG) injection   SubCUTAneous AC&HS    glucose chewable tablet 16 g  4 Tab Oral PRN    dextrose (D50W) injection syrg 12.5-25 g  25-50 mL IntraVENous PRN    glucagon (GLUCAGEN) injection 1 mg  1 mg IntraMUSCular PRN    melatonin tablet 3 mg  3 mg Oral QHS PRN             Romario Reid MD

## 2019-07-07 NOTE — PROGRESS NOTES
Bedside and Verbal shift change report given to Naveen ALAN RN (oncoming nurse) by Moni Gonzales (offgoing nurse). Report included the following information SBAR and Kardex.

## 2019-07-08 VITALS
BODY MASS INDEX: 38.09 KG/M2 | RESPIRATION RATE: 18 BRPM | HEIGHT: 65 IN | HEART RATE: 71 BPM | DIASTOLIC BLOOD PRESSURE: 76 MMHG | SYSTOLIC BLOOD PRESSURE: 166 MMHG | TEMPERATURE: 97.5 F | OXYGEN SATURATION: 96 % | WEIGHT: 228.62 LBS

## 2019-07-08 LAB
ALBUMIN SERPL-MCNC: 3 G/DL (ref 3.5–5)
ALBUMIN/GLOB SERPL: 0.8 {RATIO} (ref 1.1–2.2)
ALP SERPL-CCNC: 65 U/L (ref 45–117)
ALT SERPL-CCNC: 32 U/L (ref 12–78)
ANION GAP SERPL CALC-SCNC: 7 MMOL/L (ref 5–15)
AST SERPL-CCNC: 26 U/L (ref 15–37)
BILIRUB SERPL-MCNC: 0.6 MG/DL (ref 0.2–1)
BUN SERPL-MCNC: 16 MG/DL (ref 6–20)
BUN/CREAT SERPL: 16 (ref 12–20)
CALCIUM SERPL-MCNC: 8.6 MG/DL (ref 8.5–10.1)
CHLORIDE SERPL-SCNC: 106 MMOL/L (ref 97–108)
CO2 SERPL-SCNC: 26 MMOL/L (ref 21–32)
CREAT SERPL-MCNC: 0.97 MG/DL (ref 0.7–1.3)
ERYTHROCYTE [DISTWIDTH] IN BLOOD BY AUTOMATED COUNT: 13.3 % (ref 11.5–14.5)
GLOBULIN SER CALC-MCNC: 3.9 G/DL (ref 2–4)
GLUCOSE BLD STRIP.AUTO-MCNC: 124 MG/DL (ref 65–100)
GLUCOSE SERPL-MCNC: 130 MG/DL (ref 65–100)
HCT VFR BLD AUTO: 40.5 % (ref 36.6–50.3)
HGB BLD-MCNC: 13.2 G/DL (ref 12.1–17)
MAGNESIUM SERPL-MCNC: 2.6 MG/DL (ref 1.6–2.4)
MCH RBC QN AUTO: 29 PG (ref 26–34)
MCHC RBC AUTO-ENTMCNC: 32.6 G/DL (ref 30–36.5)
MCV RBC AUTO: 89 FL (ref 80–99)
NRBC # BLD: 0 K/UL (ref 0–0.01)
NRBC BLD-RTO: 0 PER 100 WBC
PLATELET # BLD AUTO: 176 K/UL (ref 150–400)
PMV BLD AUTO: 9.4 FL (ref 8.9–12.9)
POTASSIUM SERPL-SCNC: 4.2 MMOL/L (ref 3.5–5.1)
PROT SERPL-MCNC: 6.9 G/DL (ref 6.4–8.2)
RBC # BLD AUTO: 4.55 M/UL (ref 4.1–5.7)
SERVICE CMNT-IMP: ABNORMAL
SODIUM SERPL-SCNC: 139 MMOL/L (ref 136–145)
WBC # BLD AUTO: 9 K/UL (ref 4.1–11.1)

## 2019-07-08 PROCEDURE — 83735 ASSAY OF MAGNESIUM: CPT

## 2019-07-08 PROCEDURE — 74011250636 HC RX REV CODE- 250/636: Performed by: INTERNAL MEDICINE

## 2019-07-08 PROCEDURE — 74011250637 HC RX REV CODE- 250/637: Performed by: FAMILY MEDICINE

## 2019-07-08 PROCEDURE — 85027 COMPLETE CBC AUTOMATED: CPT

## 2019-07-08 PROCEDURE — 97161 PT EVAL LOW COMPLEX 20 MIN: CPT

## 2019-07-08 PROCEDURE — 97116 GAIT TRAINING THERAPY: CPT

## 2019-07-08 PROCEDURE — 77030020138

## 2019-07-08 PROCEDURE — 36415 COLL VENOUS BLD VENIPUNCTURE: CPT

## 2019-07-08 PROCEDURE — 82962 GLUCOSE BLOOD TEST: CPT

## 2019-07-08 PROCEDURE — 80053 COMPREHEN METABOLIC PANEL: CPT

## 2019-07-08 PROCEDURE — 74011250637 HC RX REV CODE- 250/637: Performed by: PODIATRIST

## 2019-07-08 PROCEDURE — 74011250636 HC RX REV CODE- 250/636: Performed by: PODIATRIST

## 2019-07-08 PROCEDURE — 74011000258 HC RX REV CODE- 258: Performed by: PODIATRIST

## 2019-07-08 RX ORDER — CLINDAMYCIN HYDROCHLORIDE 300 MG/1
300 CAPSULE ORAL 3 TIMES DAILY
Qty: 42 CAP | Refills: 0 | Status: SHIPPED | OUTPATIENT
Start: 2019-07-08 | End: 2020-05-11

## 2019-07-08 RX ADMIN — MEROPENEM 500 MG: 500 INJECTION, POWDER, FOR SOLUTION INTRAVENOUS at 02:04

## 2019-07-08 RX ADMIN — Medication 10 ML: at 06:29

## 2019-07-08 RX ADMIN — MEROPENEM 500 MG: 500 INJECTION, POWDER, FOR SOLUTION INTRAVENOUS at 09:09

## 2019-07-08 RX ADMIN — LISINOPRIL 20 MG: 20 TABLET ORAL at 09:09

## 2019-07-08 RX ADMIN — VANCOMYCIN HYDROCHLORIDE 1500 MG: 10 INJECTION, POWDER, LYOPHILIZED, FOR SOLUTION INTRAVENOUS at 02:38

## 2019-07-08 RX ADMIN — PANTOPRAZOLE SODIUM 40 MG: 40 TABLET, DELAYED RELEASE ORAL at 06:30

## 2019-07-08 NOTE — DISCHARGE SUMMARY
Physician Discharge Summary     Patient ID:    Bobo Santana  [id number]  80 y.o.  1950  Norm Ho MD    Admit date: 7/4/2019  Discharge date and time: 7/8/2019  Admission Diagnoses: Diabetic foot infection ([facility] [U54.448, L08.9]; osteomyelitis left 5th toe    Chronic Diagnoses:    Problem List as of 7/8/2019 Date Reviewed: 7/5/2019          Codes Class Noted - Resolved    * (Principal) Diabetic foot infection ([facility] ICD-10-CM: E11.628, L08.9  ICD-9-CM: 250.80, 686.9  7/4/2019 - Present        Elevated serum creatinine ICD-10-CM: R79.89  ICD-9-CM: 790.99  7/4/2019 - Present        DM type 2 with diabetic peripheral neuropathy (HCC) (Chronic) ICD-10-CM: E11.42  ICD-9-CM: 250.60, 357.2  7/4/2019 - Present        S/P CABG x 4 (Chronic) ICD-10-CM: Z95.1  ICD-9-CM: V45.81  3/25/2015 - Present        CAD (coronary artery disease) (Chronic) ICD-10-CM: I25.10  ICD-9-CM: 414.00  3/23/2015 - Present    Overview Signed 3/23/2015 12:05 PM by Savannah Boast     ECHO: 3/21/2015: EF 55-60%, no WMA, mild LVH, MAC, no MR   CATH: 3/23/2015: Obstructive 3VD:dLM:80%, mLAD:80%, dLAD:70%, D1:90% mLCX: 90%, mRCA:50%, PDA: 80%, EF 55%, Focal mid inferior AK.                HTN (hypertension) (Chronic) ICD-10-CM: I10  ICD-9-CM: 401.9  3/23/2015 - Present        MING (obstructive sleep apnea) (Chronic) ICD-10-CM: G47.33  ICD-9-CM: 327.23  3/23/2015 - Present        Obesity (Chronic) ICD-10-CM: E66.9  ICD-9-CM: 278.00  3/23/2015 - Present        RESOLVED: Paronychia of great toe of right foot ICD-10-CM: L03.031  ICD-9-CM: 681.11  3/23/2015 - 7/4/2019        RESOLVED: Unstable angina (HCC) ICD-10-CM: I20.0  ICD-9-CM: 411.1  3/21/2015 - 7/4/2019        RESOLVED: NSTEMI (non-ST elevated myocardial infarction) [facility]) ICD-10-CM: I21.4  ICD-9-CM: 410.70  3/21/2015 - 7/4/2019            Discharge Medications:   Current Discharge Medication List      START taking these medications    Details   clindamycin (CLEOCIN) 300 mg capsule Take 1 Cap by mouth three (3) times daily. Qty: 42 Cap, Refills: 0         CONTINUE these medications which have NOT CHANGED    Details   insulin glargine U-300 conc (TOUJEO MAX U-300 SOLOSTAR) 300 unit/mL (3 mL) inpn 220 Units by SubCUTAneous route nightly. lisinopril-hydroCHLOROthiazide (PRINZIDE, ZESTORETIC) 20-12.5 mg per tablet Take 2 Tabs by mouth daily. acetaminophen (TYLENOL) 500 mg tablet Take 1,000 mg by mouth every six (6) hours as needed for Pain. rosuvastatin (CRESTOR) 10 mg tablet Take 10 mg by mouth nightly. carvedilol (COREG) 25 mg tablet TAKE 1 & 1/2 (ONE & ONE-HALF) TABLETS BY MOUTH TWICE DAILY WITH MEALS  Qty: 270 Tab, Refills: 3      eplerenone (INSPRA) 25 mg tablet Take 1 Tab by mouth daily. Qty: 90 Tab, Refills: 1    Associated Diagnoses: Essential hypertension; Type 2 diabetes mellitus without complication, with long-term current use of insulin (HCC)      dilTIAZem CD (CARDIZEM CD) 300 mg ER capsule Take 1 Cap by mouth daily. Qty: 90 Cap, Refills: 3    Associated Diagnoses: Coronary artery disease involving native coronary artery of native heart without angina pectoris; Essential hypertension; Dyslipidemia      Liraglutide (VICTOZA) 0.6 mg/0.1 mL (18 mg/3 mL) pnij 1.8 mg by SubCUTAneous route nightly. Associated Diagnoses: Essential hypertension; Type 2 diabetes mellitus without complication, with long-term current use of insulin (HCC)      omeprazole (PRILOSEC) 20 mg capsule Take 20 mg by mouth daily. Associated Diagnoses: Coronary artery disease involving native coronary artery of native heart without angina pectoris; Essential hypertension; Dyslipidemia      Azelastine (ASTEPRO) 0.15 % (205.5 mcg) nasal spray 1 Spray daily. Associated Diagnoses: Coronary artery disease involving native coronary artery of native heart without angina pectoris; Essential hypertension; Dyslipidemia      montelukast (SINGULAIR) 10 mg tablet Take 10 mg by mouth every evening.       dapagliflozin (FARXIGA) 10 mg tab Take 10 mg by mouth daily. cetirizine (ZYRTEC) 10 mg tablet Take 10 mg by mouth daily as needed for Allergies. aspirin delayed-release 81 mg tablet Take 81 mg by mouth daily. linagliptin (TRADJENTA) 5 mg tablet Take 5 mg by mouth nightly. Follow up Care:    1. Eliel Negron MD with in 1 weeks  2.  specialists as directed. Diet:  Diabetic Diet  Disposition:  Home. Advanced Directive:  Discharge Exam:  [See today's progress note.]  CONSULTATIONS: Podiatry    Significant Diagnostic Studies:   Recent Labs     07/08/19 0204 07/07/19 0101   WBC 9.0 10.8   HGB 13.2 12.9   HCT 40.5 38.9    185     Recent Labs     07/08/19 0204 07/07/19 0101 07/06/19 0203    137 138   K 4.2 4.1 3.9    103 106   CO2 26 28 26   BUN 16 15 17   CREA 0.97 1.14 1.28   * 124* 120*   CA 8.6 8.5 8.5   MG 2.6* 2.4 2.6*     Recent Labs     07/08/19 0204 07/07/19 0101 07/06/19  0203   SGOT 26 23 22   ALT 32 30 32   AP 65 64 59   TBILI 0.6 0.7 0.7   TP 6.9 6.9 6.9   ALB 3.0* 3.1* 3.2*   GLOB 3.9 3.8 3.7     Lab Results   Component Value Date/Time    Glucose (POC) 158 (H) 07/07/2019 09:26 PM    Glucose (POC) 182 (H) 07/07/2019 03:57 PM    Glucose (POC) 138 (H) 07/07/2019 11:25 AM    Glucose (POC) 124 (H) 07/07/2019 06:59 AM    Glucose (POC) 151 (H) 07/06/2019 09:26 PM     Lab Results   Component Value Date/Time    TSH 1.74 03/23/2015 10:02 PM     HOSPITAL COURSE & TREATMENT RENDERED:   1. See today's progress note:    Daily Progress Note and Discharge Note: 7/8/2019  Yessica Ramey MD         Assessment/Plan:   Diabetic foot infection ([address] (7/4/2019)              - vancomycin, Merrem inpt- culture with Staph aureus sens to [facility]              - Clindamycin outpt x 2 wks  7/5/19  1. PARTIAL EXCISION 5TH METATARSAL  2.  PARTIAL EXCISION 5TH TOE PROXIMAL PHALANX     CAD (coronary artery disease) (3/23/2015)              - stable, continue cardiac meds     ARF -- Cr elevated on admission - recheck outpt  --improved     HTN (hypertension) (3/23/2015)              -  Resume home meds and f/u later this wk     MING (obstructive sleep apnea) (3/23/2015)              - cont CPAP at home     Obesity (3/23/2015), morbid              - counseled on weight loss      DM type 2 with diabetic peripheral neuropathy ([facility] (7/4/2019)              - BS okay with current meds. Home control not ideal A1c at 8.5%     Code status:              - patient is FULL CODE, no AMD on file, wife [facility]el[facility] is surrogate            Subjective:    80 y.o.   male who is admitted with diabetic foot infection.  Mr. Santana presented to the Emergency Department this PM complaining of swollen foot: left side, constant, for the past day or so, associated with redness and fever; has had foot sores for the past 4+ weeks and has been self-treating at home without improvement  History obtained from chart review and the patient. Previous records reviewed: admit here in 2015 with NSTEMI that led to transfer for [facility] and eventual CABG for CAD, outpatient follow up with Cardiology (Dr Sandrita Falcon)     7/5:  Little pain in foot. He thinks there is less redness. No other complaints. Reports glucoses have been under good control outpt. Discussed meds and which ones should be held with elevated Creat. Creat improved from yesterday - cont to monitor. Podiatry to see today - further imaging per podiatry.       7/6: No acute events overnight. He feels well. No pain in the foot. Denies cp, sob, dizziness, HA.     7/7: OR yesterday. No acute events overnight. He feels well. Tolerating po. Bowels working. No pain.     7/8:  Feeling better. Little pain. Podiatry has cleared him for discharge on Clindamycin po. Resume home meds. Diet/wt loss recommended. Follow up with Dr Zeeshan Simons later this wk. Podiatry gave wound care instructions.   F/U with Podiatry as directed.       Review of Systems:   A comprehensive review of systems was negative except for that written in the HPI.     Objective:   Physical Exam:   Visit Vitals  /72 (BP 1 Location: Right arm, BP Patient Position: At rest)   Pulse 74   Temp 97.9 °F (36.6 °C)   Resp 18   Ht 5' 5\" (1.651 m)   Wt 112.1 kg (247 lb 1.6 oz)   SpO2 97%   BMI 41.12 kg/m²    O2 Flow Rate (L/min): 4 l/min O2 Device: Room air  Temp (24hrs), Av.3 °F (36.8 °C), Min:97.4 °F (36.3 °C), Max:99.3 °F (37.4 °C)    No intake/output data recorded.  1901 -  0700  In: 80 [I.V.:4810]  Out: -   General:  Alert, morbidly obese, cooperative, no distress, appears stated age. Head:  Normocephalic, without obvious abnormality, atraumatic. Eyes:  Conjunctivae/corneas clear. PERRL, EOMs intact. Lungs:   Clear to auscultation bilaterally. Heart:  Regular rate and rhythm, S1, S2 normal, no murmur, click, rub or gallop. Abdomen:   Soft, non-tender. Bowel sounds normal. No masses,  No organomegaly. Extremities: no cyanosis. left foot/ankle dressed. No calf tenderness or cords. Pulses: 2+ and symmetric all extremities. Skin: Left foot wrapped, dressing dry. Neurologic: CNII-XII intact. Alert and oriented X 3.       Data Review:     MRI Left Foot 19  FINDINGS: Bone marrow: Edema is seen in the fifth metatarsal head and proximal  fifth phalanx. There is mild decreased T1 signal and a portion of the inferior  segment tarsal head. Degenerative changes are seen in the midfoot with small  areas of edema. Joint fluid:  Small fifth MTP joint effusion. Tendons: Intact. Muscles: Diffuse edema in the musculature may be reactive or related to diabetic  neuropathy. Neurovascular bundles: Within normal limits. Articular cartilage:Degenerative changes in the midfoot. Soft tissue mass: No mass. There is a small ulceration lateral to the fifth MTP  joint. There is extensive soft tissue edema over the dorsum of the foot.   IMPRESSION:   Soft tissue ulcer lateral to the fifth MTP joint. Edema and decreased T1 signal  in the fifth metatarsal head likely related to acute osteomyelitis. Edema is  present in the fifth proximal phalanx which may be reactive or related to early  osteomyelitis. There is a small fifth MTP joint effusion may represent septic  arthritis. Significant subcutaneous changes edema seen over the dorsum of the  foot which could be related to cellulitis or patient volume fluid status.     Doppler LEs 7/5/19  Interpretation Summary   Left lower extremity venous duplex negative for deep venous thrombosis or thrombophlebitis. Right common femoral vein is thrombus free. Lower Extremity Venous Findings   Right Lower Venous   For comparison purposes, the right common femoral vein was briefly interrogated. The vein demonstrates normal color filing and compressibility. Doppler flow was phasic and spontaneous. Left Lower Venous   No evidence of deep vein thrombosis in the common femoral, profunda femoral, superficial femoral, popliteal, posterior tibial, and peroneal veins. The veins were imaged in the transverse and longitudinal planes. The vessels showed normal color filling and compressibility.  Doppler interrogation showed phasic and spontaneous flow.            Lab Results   Component Value Date/Time     Hemoglobin A1c 8.5 (H) 07/04/2019 03:09 PM      Signed:  Ivette Pereira MD  7/8/2019  7:57 AM

## 2019-07-08 NOTE — PROGRESS NOTES
Pharmacist Discharge Medication Reconciliation    Discharge Provider:  Dr. Chiqui Rojo       Discharge Medications:      My Medications        START taking these medications        Instructions Each Dose to Equal Morning Noon Evening Bedtime   clindamycin 300 mg capsule  Commonly known as:  CLEOCIN      Take 1 Cap by mouth three (3) times daily. 300 mg                CONTINUE taking these medications        Instructions Each Dose to Equal Morning Noon Evening Bedtime   acetaminophen 500 mg tablet  Commonly known as:  TYLENOL      Take 1,000 mg by mouth every six (6) hours as needed for Pain. 1000 mg         aspirin delayed-release 81 mg tablet      Take 81 mg by mouth daily. 81 mg         azelastine 0.15 % (205.5 mcg)  Commonly known as:  ASTEPRO      1 Spray daily. 1 Spray         carvedilol 25 mg tablet  Commonly known as:  COREG      TAKE 1 & 1/2 (ONE & ONE-HALF) TABLETS BY MOUTH TWICE DAILY WITH MEALS          cetirizine 10 mg tablet  Commonly known as:  ZYRTEC      Take 10 mg by mouth daily as needed for Allergies. 10 mg         CRESTOR 10 mg tablet  Generic drug:  rosuvastatin      Take 10 mg by mouth nightly. 10 mg         dilTIAZem  mg ER capsule  Commonly known as:  CARDIZEM CD      Take 1 Cap by mouth daily. 300 mg         eplerenone 25 mg tablet  Commonly known as:  INSPRA      Take 1 Tab by mouth daily. 25 mg         FARXIGA 10 mg Tab tablet  Generic drug:  dapagliflozin      Take 10 mg by mouth daily. 10 mg         liraglutide 0.6 mg/0.1 mL (18 mg/3 mL) Pnij  Commonly known as:  VICTOZA      1.8 mg by SubCUTAneous route nightly. 1.8 mg         lisinopril-hydroCHLOROthiazide 20-12.5 mg per tablet  Commonly known as:  PRINZIDE, ZESTORETIC      Take 2 Tabs by mouth daily. 2 Tab         montelukast 10 mg tablet  Commonly known as:  SINGULAIR      Take 10 mg by mouth every evening.    10 mg         omeprazole 20 mg capsule  Commonly known as:  PRILOSEC      Take 20 mg by mouth daily. 20 mg         TOUJEO MAX U-300 SOLOSTAR 300 unit/mL (3 mL) Inpn  Generic drug:  insulin glargine U-300 conc      220 Units by SubCUTAneous route nightly. 220 Units         TRADJENTA 5 mg tablet  Generic drug:  linaGLIPtin      Take 5 mg by mouth nightly.    5 mg                   Where to Get Your Medications        These medications were sent to [address] [place] [address], [address] [place] [address]      Phone:  [phone]   clindamycin 300 mg capsule          The patient's chart, MAR, and AVS were reviewed by   KRISTA Garcia,   Contact: [phone]

## 2019-07-08 NOTE — PROGRESS NOTES
Daily Progress Note and Discharge Note: 7/8/2019  Yessica Brandt MD    Assessment/Plan:   Diabetic foot infection [facility]) (7/4/2019)              - vancomycin, Merrem inpt- culture with Staph aureus sens to Clinda              - Clindamycin outpt x 2 wks  7/5/19  1. PARTIAL EXCISION 5TH METATARSAL  2. PARTIAL EXCISION 5TH TOE PROXIMAL PHALANX     CAD (coronary artery disease) (3/23/2015)              - stable, continue cardiac meds     ARF -- Cr elevated on admission - recheck outpt  --improved     HTN (hypertension) (3/23/2015)              -  Resume home meds and f/u later this wk     MING (obstructive sleep apnea) (3/23/2015)              - cont CPAP at home     Obesity (3/23/2015), morbid              - counseled on weight loss      DM type 2 with diabetic peripheral neuropathy ([facility] (7/4/2019)              - BS okay with current meds. Home control not ideal A1c at 8.5%     Code status:              - patient is FULL CODE, no AMD on file, wife Chaka is surrogate          Subjective:    80 y.o.  male who is admitted with diabetic foot infection. Mr. Samson Jain presented to the Emergency Department this PM complaining of swollen foot: left side, constant, for the past day or so, associated with redness and fever; has had foot sores for the past 4+ weeks and has been self-treating at home without improvement  History obtained from chart review and the patient. Previous records reviewed: admit here in 2015 with NSTEMI that led to transfer for [facility] and eventual CABG for CAD, outpatient follow up with Cardiology (Dr Carola Nagy)    7/5:  Little pain in foot. He thinks there is less redness. No other complaints. Reports glucoses have been under good control outpt. Discussed meds and which ones should be held with elevated Creat. Creat improved from yesterday - cont to monitor. Podiatry to see today - further imaging per podiatry. 7/6: No acute events overnight. He feels well. No pain in the foot. Denies cp, sob, dizziness, HA.    : OR yesterday. No acute events overnight. He feels well. Tolerating po. Bowels working. No pain. :  Feeling better. Little pain. Podiatry has cleared him for discharge on Clindamycin po. Resume home meds. Diet/wt loss recommended. Follow up with Dr Lisy Lee later this wk. Podiatry gave wound care instructions. F/U with Podiatry as directed.       Review of Systems:   A comprehensive review of systems was negative except for that written in the HPI. Objective:   Physical Exam:   Visit Vitals  /72 (BP 1 Location: Right arm, BP Patient Position: At rest)   Pulse 74   Temp 97.9 °F (36.6 °C)   Resp 18   Ht 5' 5\" (1.651 m)   Wt 112.1 kg (247 lb 1.6 oz)   SpO2 97%   BMI 41.12 kg/m²    O2 Flow Rate (L/min): 4 l/min O2 Device: Room air  Temp (24hrs), Av.3 °F (36.8 °C), Min:97.4 °F (36.3 °C), Max:99.3 °F (37.4 °C)    No intake/output data recorded.  1901 -  0700  In: 80 [I.V.:4810]  Out: -   General:  Alert, morbidly obese, cooperative, no distress, appears stated age. Head:  Normocephalic, without obvious abnormality, atraumatic. Eyes:  Conjunctivae/corneas clear. PERRL, EOMs intact. Lungs:   Clear to auscultation bilaterally. Heart:  Regular rate and rhythm, S1, S2 normal, no murmur, click, rub or gallop. Abdomen:   Soft, non-tender. Bowel sounds normal. No masses,  No organomegaly. Extremities: no cyanosis. left foot/ankle dressed. No calf tenderness or cords. Pulses: 2+ and symmetric all extremities. Skin: Left foot wrapped, dressing dry. Neurologic: CNII-XII intact. Alert and oriented X 3. Data Review:     MRI Left Foot 19  FINDINGS: Bone marrow: Edema is seen in the fifth metatarsal head and proximal  fifth phalanx. There is mild decreased T1 signal and a portion of the inferior  segment tarsal head. Degenerative changes are seen in the midfoot with small  areas of edema.   Joint fluid: Small fifth MTP joint effusion. Tendons: Intact. Muscles: Diffuse edema in the musculature may be reactive or related to diabetic  neuropathy. Neurovascular bundles: Within normal limits. Articular cartilage:Degenerative changes in the midfoot. Soft tissue mass: No mass. There is a small ulceration lateral to the fifth MTP  joint. There is extensive soft tissue edema over the dorsum of the foot. IMPRESSION:   Soft tissue ulcer lateral to the fifth MTP joint. Edema and decreased T1 signal  in the fifth metatarsal head likely related to acute osteomyelitis. Edema is  present in the fifth proximal phalanx which may be reactive or related to early  osteomyelitis. There is a small fifth MTP joint effusion may represent septic  arthritis. Significant subcutaneous changes edema seen over the dorsum of the  foot which could be related to cellulitis or patient volume fluid status.     Doppler LEs 7/5/19  Interpretation Summary   Left lower extremity venous duplex negative for deep venous thrombosis or thrombophlebitis. Right common femoral vein is thrombus free. Lower Extremity Venous Findings   Right Lower Venous   For comparison purposes, the right common femoral vein was briefly interrogated. The vein demonstrates normal color filing and compressibility. Doppler flow was phasic and spontaneous. Left Lower Venous   No evidence of deep vein thrombosis in the common femoral, profunda femoral, superficial femoral, popliteal, posterior tibial, and peroneal veins. The veins were imaged in the transverse and longitudinal planes. The vessels showed normal color filling and compressibility. Doppler interrogation showed phasic and spontaneous flow.      Lab Results   Component Value Date/Time    Hemoglobin A1c 8.5 (H) 07/04/2019 03:09 PM     Recent Days:  Recent Labs     07/08/19  0204 07/07/19  0101 07/06/19  0203   WBC 9.0 10.8 10.2   HGB 13.2 12.9 13.2   HCT 40.5 38.9 40.2    185 169     Recent Labs 07/08/19  0204 07/07/19  0101 07/06/19  0203    137 138   K 4.2 4.1 3.9    103 106   CO2 26 28 26   * 124* 120*   BUN 16 15 17   CREA 0.97 1.14 1.28   CA 8.6 8.5 8.5   MG 2.6* 2.4 2.6*   ALB 3.0* 3.1* 3.2*   TBILI 0.6 0.7 0.7   SGOT 26 23 22   ALT 32 30 32     No results for input(s): PH, PCO2, PO2, HCO3, FIO2 in the last 72 hours. 24 Hour Results:  Recent Results (from the past 24 hour(s))   GLUCOSE, POC    Collection Time: 07/07/19 11:25 AM   Result Value Ref Range    Glucose (POC) 138 (H) 65 - 100 mg/dL    Performed by [address] [facility] [address], POC    Collection Time: 07/07/19  3:57 PM   Result Value Ref Range    Glucose (POC) 182 (H) 65 - 100 mg/dL    Performed by [address] [facility] [address], POC    Collection Time: 07/07/19  9:26 PM   Result Value Ref Range    Glucose (POC) 158 (H) 65 - 100 mg/dL    Performed by Eliza Billings (PCT)    CBC W/O DIFF    Collection Time: 07/08/19  2:04 AM   Result Value Ref Range    WBC 9.0 4.1 - 11.1 K/uL    RBC 4.55 4.10 - 5.70 M/uL    HGB 13.2 12.1 - 17.0 g/dL    HCT 40.5 36.6 - 50.3 %    MCV 89.0 80.0 - 99.0 FL    MCH 29.0 26.0 - 34.0 PG    MCHC 32.6 30.0 - 36.5 g/dL    RDW 13.3 11.5 - 14.5 %    PLATELET 399 283 - 230 K/uL    MPV 9.4 8.9 - 12.9 FL    NRBC 0.0 0  WBC    ABSOLUTE NRBC 0.00 0.00 - 1.28 K/uL   METABOLIC PANEL, COMPREHENSIVE    Collection Time: 07/08/19  2:04 AM   Result Value Ref Range    Sodium 139 136 - 145 mmol/L    Potassium 4.2 3.5 - 5.1 mmol/L    Chloride 106 97 - 108 mmol/L    CO2 26 21 - 32 mmol/L    Anion gap 7 5 - 15 mmol/L    Glucose 130 (H) 65 - 100 mg/dL    BUN 16 6 - 20 MG/DL    Creatinine 0.97 0.70 - 1.30 MG/DL    BUN/Creatinine ratio 16 12 - 20      GFR est AA >60 >60 ml/min/1.73m2    GFR est non-AA >60 >60 ml/min/1.73m2    Calcium 8.6 8.5 - 10.1 MG/DL    Bilirubin, total 0.6 0.2 - 1.0 MG/DL    ALT (SGPT) 32 12 - 78 U/L    AST (SGOT) 26 15 - 37 U/L    Alk.  phosphatase 65 45 - 117 U/L    Protein, total 6.9 6.4 - 8.2 g/dL Albumin 3.0 (L) 3.5 - 5.0 g/dL    Globulin 3.9 2.0 - 4.0 g/dL    A-G Ratio 0.8 (L) 1.1 - 2.2     MAGNESIUM    Collection Time: 07/08/19  2:04 AM   Result Value Ref Range    Magnesium 2.6 (H) 1.6 - 2.4 mg/dL       Medications reviewed  Current Facility-Administered Medications   Medication Dose Route Frequency    lisinopril (PRINIVIL, ZESTRIL) tablet 20 mg  20 mg Oral DAILY    sodium chloride (NS) flush 5-40 mL  5-40 mL IntraVENous Q8H    vancomycin (VANCOCIN) 1,500 mg in 0.9% sodium chloride 500 mL IVPB  1,500 mg IntraVENous Q12H    rosuvastatin (CRESTOR) tablet 10 mg  10 mg Oral QHS    montelukast (SINGULAIR) tablet 10 mg  10 mg Oral QHS    dapagliflozin (FARXIGA) tablet 10 mg (Patient Supplied)  10 mg Oral DAILY    liraglutide (VICTOZA) 0.6 mg/0.1 mL (18 mg/3 mL) sub-q pen 1.8 mg (Patient Supplied)  1.8 mg SubCUTAneous QHS    pantoprazole (PROTONIX) tablet 40 mg  40 mg Oral ACB    sodium chloride (NS) flush 5-10 mL  5-10 mL IntraVENous PRN    meropenem (MERREM) 500 mg in 0.9% sodium chloride (MBP/ADV) 50 mL  0.5 g IntraVENous Q6H    0.9% sodium chloride infusion  100 mL/hr IntraVENous CONTINUOUS    sodium chloride (NS) flush 5-40 mL  5-40 mL IntraVENous PRN    acetaminophen (TYLENOL) tablet 650 mg  650 mg Oral Q4H PRN    oxyCODONE-acetaminophen (PERCOCET) 5-325 mg per tablet 1 Tab  1 Tab Oral Q4H PRN    HYDROmorphone (DILAUDID) syringe 0.5 mg  0.5 mg IntraVENous Q4H PRN    prochlorperazine (COMPAZINE) injection 10 mg  10 mg IntraVENous Q6H PRN    insulin lispro (HUMALOG) injection   SubCUTAneous AC&HS    glucose chewable tablet 16 g  4 Tab Oral PRN    dextrose (D50W) injection syrg 12.5-25 g  25-50 mL IntraVENous PRN    glucagon (GLUCAGEN) injection 1 mg  1 mg IntraMUSCular PRN    melatonin tablet 3 mg  3 mg Oral QHS PRN       Discussed with Pt/nurse/podiatry      Michael Sahu MD

## 2019-07-08 NOTE — PROGRESS NOTES
physical Therapy EVALUATION/DISCHARGE  Patient: Bo Santana (89 y.o. male)  Date: 7/8/2019  Primary Diagnosis: Diabetic foot infection ([facility] [E11.628, L08.9]  Procedure(s) (LRB):  INCISION AND DRAINAGE LEFT FOOT AND POSSIBLE BONE RESECTION (Left) 2 Days Post-Op   Precautions: left toe off loading wedge shoe      ASSESSMENT :  Based on the objective data described above, the patient presents with independent with ambulation without assistive device as well as up and down stairs and independent with all functional mobility. Provided with left toe off loading wedge shoe. Reviewed all safety precaution and home exercise program with the patient, verbalized understanding, clear to go home per Physical Therapy perspective. Skilled physical therapy is not indicated at this time. PLAN :  Discharge Recommendations: None  Further Equipment Recommendations for Discharge: none     SUBJECTIVE:   Patient stated Ok.     OBJECTIVE DATA SUMMARY:   HISTORY:    Past Medical History:   Diagnosis Date    CAD (coronary artery disease)     NSTEMI 3/21/15; CABG 3/24/15;  LVEF 55%    Diabetes ([facility]     Dyslipidemia     FH ischemic heart disease     HTN (hypertension)     Hypertension     NSTEMI (non-ST elevated myocardial infarction) ([facility]     NSTEMI 3/21/15    Obesity     MING (obstructive sleep apnea)     surgery in past    MING (obstructive sleep apnea)     on CPAP    Paronychia of great toe of right foot     S/P CABG x 4     CABG 3/24/15 - LIMA TO LAD, SEQUENTIAL VEIN GRAFT TO OM1, OM2; SVG TO DISTAL RCA     Past Surgical History:   Procedure Laterality Date    HX APPENDECTOMY      HX HEENT      eyes     Prior Level of Function/Home Situation: Independent community ambulator without assistive device.   Personal factors and/or comorbidities impacting plan of care:     Home Situation  Home Environment: Private residence  # Steps to Enter: 5  One/Two Story Residence: Split level  # of Interior Steps: 7  Lift Chair Available: No  Living Alone: No  Support Systems: Spouse/Significant Other/Partner  Patient Expects to be Discharged to[de-identified] Private residence  Current DME Used/Available at Home: CPAP    EXAMINATION/PRESENTATION/DECISION MAKING:   Critical Behavior:  Neurologic State: Alert  Orientation Level: Oriented X4  Cognition: Follows commands     Hearing: Auditory  Auditory Impairment: None  Hearing Aids/Status: Does not own    Range Of Motion:  AROM: Within functional limits           PROM: Within functional limits           Strength:    Strength: Within functional limits                    Tone & Sensation:                                  Coordination:  Coordination: Within functional limits  Vision:      Functional Mobility:  Bed Mobility:  Rolling: Independent  Supine to Sit: Independent  Sit to Supine: Independent  Scooting: Independent  Transfers:  Sit to Stand: Independent  Stand to Sit: Independent  Stand Pivot Transfers: Independent     Bed to Chair: Independent              Balance:   Sitting: Intact  Standing: Intact; Without support  Ambulation/Gait Training:  Distance (ft): 200 Feet (ft)  Assistive Device: Orthotic device;Gait belt  Ambulation - Level of Assistance: Independent     Gait Description (WDL): Within defined limits                                              Stairs:  Number of Stairs Trained: 4  Stairs - Level of Assistance: Independent   Rail Use: Both     Therapeutic Exercises:    Instructed patient to continue active range of motion exercise on both legs while up on chair or on bed. Functional Measure:  Barthel Index:    Bathin  Bladder: 10  Bowels: 10  Groomin  Dressing: 10  Feeding: 10  Mobility: 15  Stairs: 10  Toilet Use: 10  Transfer (Bed to Chair and Back): 15  Total: 100/100       Percentage of impairment   0%   1-19%   20-39%   40-59%   60-79%   80-99%   100%   Barthel Score 0-100 100 99-80 79-60 59-40 20-39 1-19   0     The Barthel ADL Index: Guidelines  1.  The index should be used as a record of what a patient does, not as a record of what a patient could do. 2. The main aim is to establish degree of independence from any help, physical or verbal, however minor and for whatever reason. 3. The need for supervision renders the patient not independent. 4. A patient's performance should be established using the best available evidence. Asking the patient, friends/relatives and nurses are the usual sources, but direct observation and common sense are also important. However direct testing is not needed. 5. Usually the patient's performance over the preceding 24-48 hours is important, but occasionally longer periods will be relevant. 6. Middle categories imply that the patient supplies over 50 per cent of the effort. 7. Use of aids to be independent is allowed. Ana Reeves., Barthel, D.W. (8480). Functional evaluation: the Barthel Index. [address] [facility] (14)2. Anish  kaylene RUI Alaniz, Tata Mcnulty., Brenden Booth., [place], [address] [place] [address] (1999). Measuring the change indisability after inpatient rehabilitation; comparison of the responsiveness of the Barthel Index and Functional Upson Measure. Journal of Neurology, Neurosurgery, and Psychiatry, 66(4), 834-496. De Will, N.J.A, EFRAIN Acuña, & Daryn Tim, M.A. (2004.) Assessment of post-stroke quality of life in cost-effectiveness studies: The usefulness of the Barthel Index and the EuroQoL-5D.  Quality of Life Research, 15, 286-27          Physical Therapy Evaluation Charge Determination   History Examination Presentation Decision-Making   LOW Complexity : Zero comorbidities / personal factors that will impact the outcome / POC LOW Complexity : 1-2 Standardized tests and measures addressing body structure, function, activity limitation and / or participation in recreation  LOW Complexity : Stable, uncomplicated  Other outcome measures barthel  LOW       Based on the above components, the patient evaluation is determined to be of the following complexity level: LOW     Pain:  Pain Scale 1: Numeric (0 - 10)  Pain Intensity 1: 0     Activity Tolerance:   Good. Please refer to the flowsheet for vital signs taken during this treatment. After treatment:   [x]   Patient left in no apparent distress sitting up in chair  []   Patient left in no apparent distress in bed  [x]   Call bell left within reach  [x]   Nursing notified  [x]   Caregiver present  []   Bed alarm activated    COMMUNICATION/EDUCATION:   Communication/Collaboration:  [x]   Fall prevention education was provided and the patient/caregiver indicated understanding. [x]   Patient/family have participated as able and agree with findings and recommendations. []   Patient is unable to participate in plan of care at this time. Findings and recommendations were discussed with: Registered Nurse and patient    Thank you for this referral.  Mayra Neely PT,WCC.    Time Calculation: 27 mins

## 2019-07-08 NOTE — PROGRESS NOTES
Discharge order noted. Patient states his wife is on her way to  patient. Patient states he is to get a boot from PT prior to being discharged. PT called, boot requested for patient as soon as possible so he can proceed with discharge. Awaiting boot. 7715: Patient given discharge instructions. Patient verbalizes understanding of discharge instructions, follow up, foot care and medication. Patient attempted to e-sign but CC not cooperating. Patient signed paper copy. Paper copy on chart. Patient awaiting boot from PT.    1026: Patient received boot from PT. Patient out of unit in wheelchair, family with patient. Patient driving patient to home.

## 2019-07-08 NOTE — PROGRESS NOTES
The [address] [place] [address] Podiatric Surgery  Post-Op Note    Subjective:   Admit Date: 7/4/2019  OR Date: 7/6/2019  2 Days Post-Op  Status Post: Left foot resection of bone 5th metatarsal and proximal phalanx 5th toe    [place] Moo Santana  appears; alert, well appearing, and in no distress  Pain control is: excellent    Objective[de-identified]    height is 5' 5\" (1.651 m) and weight is 103.7 kg (228 lb 9.9 oz). His temperature is 97.5 °F (36.4 °C). His blood pressure is 166/76 and his pulse is 71. His respiration is 18 and oxygen saturation is 96%. Intake/Output Summary (Last 24 hours) at 7/8/2019 0848  Last data filed at 7/8/2019 0342  Gross per 24 hour   Intake 3160 ml   Output --   Net 3160 ml       Physical Exam:  Incision: : incision well coapted with sutures intact. Mild edema and erythema dorsal foot. Minimal drainage.   Abdomen: soft, nontender, nondistended, no masses or organomegaly  DVT Exam:No evidence of DVT seen on physical exam    Labs:  Lab Results   Component Value Date/Time    WBC 9.0 07/08/2019 02:04 AM    HGB 13.2 07/08/2019 02:04 AM    HCT 40.5 07/08/2019 02:04 AM    MCV 89.0 07/08/2019 02:04 AM    PLATELET 944 85/92/6749 02:04 AM     Lab Results   Component Value Date/Time    Sodium 139 07/08/2019 02:04 AM    Potassium 4.2 07/08/2019 02:04 AM    Chloride 106 07/08/2019 02:04 AM    CO2 26 07/08/2019 02:04 AM    Phosphorus 3.2 07/05/2019 02:48 AM    BUN 16 07/08/2019 02:04 AM    Calcium 8.6 07/08/2019 02:04 AM      Lab Results   Component Value Date/Time    Glucose 130 (H) 07/08/2019 02:04 AM     Special Requests:      Final   NO SPECIAL REQUESTS    GRAM STAIN RARE WBCS SEEN      Final   GRAM STAIN      Final   OCCASIONAL GRAM POSITIVE COCCI IN PAIRS    Culture result: Abnormal       Final   MODERATE STAPHYLOCOCCUS AUREUS    Susceptibility     Staphylococcus aureus     Antibiotic Interpretation Value Method Comment   Ampicillin ($) Resistant 8 ug/mL VIKASH    Ampicillin/sulbactam ($) Susceptible <=8/4 ug/mL VIKASH Cefazolin ($) Susceptible <=4 ug/mL VIKASH    Clindamycin ($) Susceptible <=0.5 ug/mL VIKASH    Daptomycin ($$$$$) Susceptible <=0.5 ug/mL VIKASH    Erythromycin ($$$$) Resistant >4 ug/mL VIKASH    Gentamicin ($) Susceptible <=4 ug/mL VIKASH    Linezolid ($$$$$) Susceptible 2 ug/mL VIKASH    Oxacillin Susceptible <=0.25 ug/mL VIKASH    Rifampin ($$$$) Susceptible <=1 ug/mL VIKASH Rifampin is not to be used for mono-therapy. Tetracycline Susceptible <=4 ug/mL VIKASH    Trimeth/Sulfa Susceptible <=0.5/9.5 ug/mL VIKASH    Vancomycin ($) Susceptible 2 ug/mL VIKASH    Ciprofloxacin ($) Susceptible <=1 ug/mL VIKASH        Impression/Plan:   Principal Problem:    Diabetic foot infection ([facility] (7/4/2019)    Active Problems:    CAD (coronary artery disease) (3/23/2015)      Overview: ECHO: 3/21/2015: EF 55-60%, no WMA, mild LVH, MAC, no MR       CATH: 3/23/2015: Obstructive 3VD:dLM:80%, mLAD:80%, dLAD:70%, D1:90% mLCX:       90%, mRCA:50%, PDA: 80%, EF 55%, Focal mid inferior AK. HTN (hypertension) (3/23/2015)      MING (obstructive sleep apnea) (3/23/2015)      Obesity (3/23/2015)      Elevated serum creatinine (7/4/2019)      DM type 2 with diabetic peripheral neuropathy ([facility] (7/4/2019)      1. Status post left foot bone resection  Plan:   Dressing changed. Incision healing well. Ordered darco forefoot offloading shoe. Minimal weightbearing at home for next week.  Ok to discharge from podiatric surgery standpoint. Recommend 2 weeks of clindamycin 300mg bid for 2 weeks. Patient will folllow up next week in office. My office will call to schedule. Shazia Ramirez.  Taisha, DPM FACFAS FACCWS [facility]  Triple Board Certified Foot & Ankle Specialist  [phone] cell  July 8, 2019  8:48 AM

## 2019-07-08 NOTE — DISCHARGE INSTRUCTIONS
Patient Discharge Instructions    Ngoc Poole / [id number] : 1950    Admitted 2019 Discharged: 2019 7:56 AM     ACUTE DIAGNOSES:  Diabetic foot infection ([facility] [X49.356, L08.9]    CHRONIC MEDICAL DIAGNOSES:  Problem List as of 2019 Date Reviewed: 2019          Codes Class Noted - Resolved    * (Principal) Diabetic foot infection ([facility] ICD-10-CM: E11.628, L08.9  ICD-9-CM: 250.80, 686.9  2019 - Present        Elevated serum creatinine ICD-10-CM: R79.89  ICD-9-CM: 790.99  2019 - Present        DM type 2 with diabetic peripheral neuropathy (HCC) (Chronic) ICD-10-CM: E11.42  ICD-9-CM: 250.60, 357.2  2019 - Present        S/P CABG x 4 (Chronic) ICD-10-CM: Z95.1  ICD-9-CM: V45.81  3/25/2015 - Present        CAD (coronary artery disease) (Chronic) ICD-10-CM: I25.10  ICD-9-CM: 414.00  3/23/2015 - Present    Overview Signed 3/23/2015 12:05 PM by Che Blanco     ECHO: 3/21/2015: EF 55-60%, no WMA, mild LVH, MAC, no MR   CATH: 3/23/2015: Obstructive 3VD:dLM:80%, mLAD:80%, dLAD:70%, D1:90% mLCX: 90%, mRCA:50%, PDA: 80%, EF 55%, Focal mid inferior AK. HTN (hypertension) (Chronic) ICD-10-CM: I10  ICD-9-CM: 401.9  3/23/2015 - Present        MING (obstructive sleep apnea) (Chronic) ICD-10-CM: G47.33  ICD-9-CM: 327.23  3/23/2015 - Present        Obesity (Chronic) ICD-10-CM: E66.9  ICD-9-CM: 278.00  3/23/2015 - Present        RESOLVED: Paronychia of great toe of right foot ICD-10-CM: L03.031  ICD-9-CM: 681.11  3/23/2015 - 2019        RESOLVED: Unstable angina (HCC) ICD-10-CM: I20.0  ICD-9-CM: 411.1  3/21/2015 - 2019        RESOLVED: NSTEMI (non-ST elevated myocardial infarction) [facility]) ICD-10-CM: I21.4  ICD-9-CM: 410.70  3/21/2015 - 2019              DISCHARGE MEDICATIONS:         · It is important that you take the medication exactly as they are prescribed.    · Keep your medication in the bottles provided by the pharmacist and keep a list of the medication names, dosages, and times to be taken in your wallet. · Do not take other medications without consulting your doctor. DIET:  Diabetic Diet    ACTIVITY: Activity as tolerated    ADDITIONAL INFORMATION: If you experience any of the following symptoms then please call your primary care physician or return to the emergency room if you cannot get hold of your doctor: Fever, chills, nausea, vomiting, diarrhea, change in mentation, falling, bleeding, shortness of breath. FOLLOW UP CARE:  Dr. Kaden Montoya MD  you are to call and set up an appointment to see them with in 1 week. Follow-up with specialists at directed by them      Information obtained by :  I understand that if any problems occur once I am at home I am to contact my physician. I understand and acknowledge receipt of the instructions indicated above.                                                                                                                                            Physician's or R.N.'s Signature                                                                  Date/Time                                                                                                                                              Patient or Representative Signature                                                          Date/Time

## 2019-07-08 NOTE — PROGRESS NOTES
CM Note:  Pt to d/c to home when he gets his boot from PT. His wife will transport him. He will f/u with his providers. No KRIS.   REYES Barkley

## 2019-07-08 NOTE — ROUTINE PROCESS
Bedside shift change report given to Shelbi Matson (oncoming nurse) by Florida Falcon (offgoing nurse). Report included the following information SBAR, Kardex, Procedure Summary, Intake/Output, MAR, Accordion, Recent Results and Med Rec Status.

## 2019-07-09 DIAGNOSIS — E78.5 DYSLIPIDEMIA: ICD-10-CM

## 2019-07-09 DIAGNOSIS — I10 ESSENTIAL HYPERTENSION: Chronic | ICD-10-CM

## 2019-07-09 DIAGNOSIS — I25.10 CORONARY ARTERY DISEASE INVOLVING NATIVE CORONARY ARTERY OF NATIVE HEART WITHOUT ANGINA PECTORIS: Chronic | ICD-10-CM

## 2019-07-10 LAB
BACTERIA SPEC CULT: NORMAL
SERVICE CMNT-IMP: NORMAL

## 2019-07-10 RX ORDER — DILTIAZEM HYDROCHLORIDE 300 MG/1
CAPSULE, EXTENDED RELEASE ORAL
Qty: 90 CAP | Refills: 3 | Status: SHIPPED | OUTPATIENT
Start: 2019-07-10 | End: 2021-08-16 | Stop reason: SDUPTHER

## 2019-07-11 LAB
BACTERIA SPEC CULT: NORMAL
SERVICE CMNT-IMP: NORMAL

## 2019-08-20 ENCOUNTER — HOSPITAL ENCOUNTER (OUTPATIENT)
Dept: DIABETES SERVICES | Age: 69
Discharge: HOME | End: 2019-08-20
Payer: COMMERCIAL

## 2019-08-20 DIAGNOSIS — E11.65 UNCONTROLLED TYPE 2 DIABETES MELLITUS WITH HYPERGLYCEMIA (HCC): ICD-10-CM

## 2019-08-20 PROCEDURE — G0109 DIAB MANAGE TRN IND/GROUP: HCPCS | Performed by: DIETITIAN, REGISTERED

## 2019-09-03 ENCOUNTER — HOSPITAL ENCOUNTER (OUTPATIENT)
Dept: DIABETES SERVICES | Age: 69
Discharge: HOME | End: 2019-09-03
Attending: FAMILY MEDICINE
Payer: MEDICARE

## 2019-09-03 DIAGNOSIS — E11.65 UNCONTROLLED TYPE 2 DIABETES MELLITUS WITH HYPERGLYCEMIA (HCC): ICD-10-CM

## 2019-09-03 PROCEDURE — G0109 DIAB MANAGE TRN IND/GROUP: HCPCS | Performed by: DIETITIAN, REGISTERED

## 2019-10-15 ENCOUNTER — HOSPITAL ENCOUNTER (OUTPATIENT)
Dept: DIABETES SERVICES | Age: 69
Discharge: HOME | End: 2019-10-15
Attending: FAMILY MEDICINE
Payer: COMMERCIAL

## 2019-10-15 DIAGNOSIS — E11.65 UNCONTROLLED TYPE 2 DIABETES MELLITUS WITH HYPERGLYCEMIA (HCC): ICD-10-CM

## 2019-10-15 PROCEDURE — G0109 DIAB MANAGE TRN IND/GROUP: HCPCS | Performed by: DIETITIAN, REGISTERED

## 2019-10-15 NOTE — DIABETES MGMT
10/15/19      Thank you for your kind referral. Your patient, Janice Mcgee has completed his/her personal initial comprehensive education plan. The education plan included the following topics: Healthy Eating, Being Active, Taking Medicines, Monitoring, Reducing Risks, Healthy Coping and Problem Solving.      Data from visit:  Weight: 8/20/2019 240#; 10/15/2019 242.2 #  HgbA1c: 8/20/2019 7.7%, 10/15/2019 7.4 %    Increased risk for diabetes: 5.7-6.4 %, Diabetes: >6.4%  Glycemic control for adults with diabetes: <7% Elderly or multiple medical conditions: <8%    Your patient modified the following goal(s) from their first class: Goal 4: Exercise more often - Go to gym 3 times a week-modified to 1-2 times per week    Education Learning Outcomes for All Education Topics(see above): Demonstrated Competency               If you have any questions, please do not hesitate to call the [facility] at [phone]    Sincerely,    Antonmisbah Marmaduke, [address] ,[address], Jeanine Song  Phone: [phone] Fax [phone]

## 2020-05-06 DIAGNOSIS — Z79.4 TYPE 2 DIABETES MELLITUS WITHOUT COMPLICATION, WITH LONG-TERM CURRENT USE OF INSULIN (HCC): Chronic | ICD-10-CM

## 2020-05-06 DIAGNOSIS — I10 ESSENTIAL HYPERTENSION: Chronic | ICD-10-CM

## 2020-05-06 DIAGNOSIS — E11.9 TYPE 2 DIABETES MELLITUS WITHOUT COMPLICATION, WITH LONG-TERM CURRENT USE OF INSULIN (HCC): Chronic | ICD-10-CM

## 2020-05-06 RX ORDER — EPLERENONE 25 MG/1
TABLET, FILM COATED ORAL
Qty: 90 TAB | Refills: 0 | Status: SHIPPED | OUTPATIENT
Start: 2020-05-06 | End: 2020-08-26

## 2020-05-11 ENCOUNTER — VIRTUAL VISIT (OUTPATIENT)
Dept: CARDIOLOGY CLINIC | Age: 70
End: 2020-05-11

## 2020-05-11 VITALS — WEIGHT: 240 LBS | BODY MASS INDEX: 39.94 KG/M2

## 2020-05-11 DIAGNOSIS — I10 ESSENTIAL HYPERTENSION: Primary | ICD-10-CM

## 2020-05-11 DIAGNOSIS — I25.118 CORONARY ARTERY DISEASE OF NATIVE ARTERY OF NATIVE HEART WITH STABLE ANGINA PECTORIS (HCC): ICD-10-CM

## 2020-05-11 NOTE — PROGRESS NOTES
Momo Grimaldo MD. [facility]  [address] [facility], [address] [place] [address] [phone]; Fax [phone]        Patient: Bharati Santana  : 1950      Today's Date: 2020        CA[facility] Cardiology Telemedicine Encounter                                                         Pursuant to the emergency declaration under the [address] [place] [address], [facility] waiver authority and the [employer] Act, this Virtual  Visit was conducted, with patient's consent, to reduce the patient's risk of exposure to COVID-19 and provide continuity of care for an established patient. Services were provided through a video synchronous discussion virtually to substitute for in-person clinic visit. HISTORY OF PRESENT ILLNESS:     History of Present Illness:    Hawk Morgan is a 77 y.o. male who was seen by synchronous (real-time) audio-video technology on 2020. He has been OK - no cardiac complaints. No CP or SOB. No dizziness. BP has been OK.          PAST MEDICAL HISTORY:     Past Medical History:   Diagnosis Date    CAD (coronary artery disease)     NSTEMI 3/21/15; CABG 3/24/15;  LVEF 55%    Diabetes ([facility]     Dyslipidemia     FH ischemic heart disease     HTN (hypertension)     Hypertension     NSTEMI (non-ST elevated myocardial infarction) ([facility]     NSTEMI 3/21/15    Obesity     MING (obstructive sleep apnea)     surgery in past    MING (obstructive sleep apnea)     on CPAP    Paronychia of great toe of right foot     S/P CABG x 4     CABG 3/24/15 - LIMA TO LAD, SEQUENTIAL VEIN GRAFT TO OM1, OM2; SVG TO DISTAL RCA           Past Surgical History:   Procedure Laterality Date    HX APPENDECTOMY      HX HEENT      eyes           Patient Active Problem List   Diagnosis Code    CAD (coronary artery disease) I25.10    HTN (hypertension) I10    MING (obstructive sleep apnea) G47.33    Obesity E66.9    S/P CABG x 4 Z95.1    Diabetic foot infection ([facility] E11.628, L08.9    Elevated serum creatinine R79.89    DM type 2 with diabetic peripheral neuropathy (HCC) E11.42             MEDICATIONS:     Current Outpatient Medications   Medication Sig Dispense Refill    eplerenone (INSPRA) 25 mg tablet Take 1 tablet by mouth once daily 90 Tab 0    CARTIA  mg ER capsule TAKE 1 CAPSULE BY MOUTH ONCE DAILY 90 Cap 3    insulin glargine U-300 conc (TOUJEO MAX U-300 SOLOSTAR) 300 unit/mL (3 mL) inpn 220 Units by SubCUTAneous route nightly.  lisinopril-hydroCHLOROthiazide (PRINZIDE, ZESTORETIC) 20-12.5 mg per tablet Take 2 Tabs by mouth daily.  acetaminophen (TYLENOL) 500 mg tablet Take 1,000 mg by mouth every six (6) hours as needed for Pain.  rosuvastatin (CRESTOR) 10 mg tablet Take 10 mg by mouth nightly.  carvedilol (COREG) 25 mg tablet TAKE 1 & 1/2 (ONE & ONE-HALF) TABLETS BY MOUTH TWICE DAILY WITH MEALS 270 Tab 3    omeprazole (PRILOSEC) 20 mg capsule Take 20 mg by mouth daily.  Azelastine (ASTEPRO) 0.15 % (205.5 mcg) nasal spray 1 Spray daily.  montelukast (SINGULAIR) 10 mg tablet Take 10 mg by mouth every evening.  dapagliflozin (FARXIGA) 10 mg tab Take 10 mg by mouth daily.  cetirizine (ZYRTEC) 10 mg tablet Take 10 mg by mouth daily as needed for Allergies.  aspirin delayed-release 81 mg tablet Take 81 mg by mouth daily.  linagliptin (TRADJENTA) 5 mg tablet Take 5 mg by mouth nightly. Allergies   Allergen Reactions    Amoxicillin Hives               SOCIAL HISTORY:     Social History     Tobacco Use    Smoking status: Never Smoker    Smokeless tobacco: Never Used   Substance Use Topics    Alcohol use: Yes     Alcohol/week: 0.0 standard drinks     Comment: rarely    Drug use:  No               FAMILY HISTORY:     Family History   Problem Relation Age of Onset    Coronary Artery Disease Mother     Coronary Artery Disease Father REVIEW OF SYMPTOMS:     Review of Symptoms:  Constitutional: Negative for fever, chills  HEENT: Negative for nosebleeds, vision changes. Respiratory: Negative for cough, wheezing  Cardiovascular: Negative for claudication, syncope  Gastrointestinal: Negative for abdominal pain, diarrhea, melena. Genitourinary: Negative for dysuria  Musculoskeletal: Negative for myalgias. Skin: Negative for rash  Heme: No problems bleeding. Neurological: Negative for speech change and focal weakness. PHYSICAL EXAM:       Physical Exam:    Due to this being a TeleHealth evaluation, many elements of the physical examination are unable to be assessed. General: Well developed, in no acute distress, cooperative and alert  HEENT: Hearing intact, non-icteric, normocephalic, atraumatic. Pupils equal/round. Moist mucous membranes. Lungs / Respiratory: No audible wheezing, no signs of respiratory distress, lips non cyanotic  Cardiac: No marked JVD visible on video. Extremities:  No edema  Neuro: A&Ox3, speech clear, no facial droop, answering questions appropriately  Skin: Skin color is normal. No rashes or lesions.  Non diaphoretic on visible skin during exam  Psych: Appropriate affect         LABS / OTHER STUDIES:         Lab Results   Component Value Date/Time    Cholesterol, total 121 03/22/2015 02:45 AM    HDL Cholesterol 26 03/22/2015 02:45 AM    LDL, calculated 41 03/22/2015 02:45 AM    Triglyceride 270 (H) 03/22/2015 02:45 AM    CHOL/HDL Ratio 4.7 03/22/2015 02:45 AM       Lab Results   Component Value Date/Time    Sodium 139 07/08/2019 02:04 AM    Potassium 4.2 07/08/2019 02:04 AM    Chloride 106 07/08/2019 02:04 AM    CO2 26 07/08/2019 02:04 AM    Anion gap 7 07/08/2019 02:04 AM    Glucose 130 (H) 07/08/2019 02:04 AM    BUN 16 07/08/2019 02:04 AM    Creatinine 0.97 07/08/2019 02:04 AM    BUN/Creatinine ratio 16 07/08/2019 02:04 AM    GFR est AA >60 07/08/2019 02:04 AM    GFR est non-AA >60 07/08/2019 02:04 AM    Calcium 8.6 07/08/2019 02:04 AM    Bilirubin, total 0.6 07/08/2019 02:04 AM    AST (SGOT) 26 07/08/2019 02:04 AM    Alk. phosphatase 65 07/08/2019 02:04 AM    Protein, total 6.9 07/08/2019 02:04 AM    Albumin 3.0 (L) 07/08/2019 02:04 AM    Globulin 3.9 07/08/2019 02:04 AM    A-G Ratio 0.8 (L) 07/08/2019 02:04 AM    ALT (SGPT) 32 07/08/2019 02:04 AM        Lab Results   Component Value Date/Time    WBC 9.0 07/08/2019 02:04 AM    Hemoglobin (POC) 15.6 03/21/2015 09:39 AM    HGB 13.2 07/08/2019 02:04 AM    Hematocrit (POC) 46 03/21/2015 09:39 AM    HCT 40.5 07/08/2019 02:04 AM    PLATELET 670 70/37/8136 02:04 AM    MCV 89.0 07/08/2019 02:04 AM       Lab Results   Component Value Date/Time    TSH 1.74 03/23/2015 10:02 PM       Lab Results   Component Value Date/Time    Hemoglobin A1c 8.5 (H) 07/04/2019 03:09 PM             CARDIAC DIAGNOSTICS:     Cardiac Evaluation Includes:      ECHO: 3/21/2015: EF 55-60%, no WMA, mild LVH, MAC, no MR    CATH: 3/23/2015: Obstructive 3VD:dLM:80%, mLAD:80%, dLAD:70%, D1:90% mLCX: 90%, mRCA:50%, PDA: 80%, EF 55%, Focal mid inferior AK. Intra-op WILEY 3/24/15 - postop LVEF 55%, mild MR  CABG 3/24/15 - LIMA TO LAD, SEQUENTIAL VEIN GRAFT TO OM1, OM2; SVG TO DISTAL RCA      EKG 3/21/15 - NSR, diffuse ST depression   EKG 5/16/16 - NSR, NSST changes   EKG 5/16/16 - NSR, NSST changes, PVC's   EKG 5/8/19 - NSR, non-specific T wave abn          ASSESSMENT AND PLAN:         Assessment and Plan:  1) CAD  - NSTEMI 3/21/15; CABG 3/24/15; LVEF 55%  - Mr. Santana is doing well s/p CABG - no anginal complaints   - Continue BB, statin, ACE-I, and ASA  - I encouraged he exercise and eat healthy       2) HTN    - on multiple meds his BP  - On 5/11/20 - BP a little high - 147/94 at home today --- have him work on diet and exercise and will see him in the office in a month   - continue meds   - BP goal < 130/80 ideally        3) Dyslipidemia    - continue Crestor - prior LDL OK.     - Lipids per PCP       4) Diabetes Mellitus  - working with PCP       5) See me in 12 months.   Patient expressed understanding of the plan - questions were answered.      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 Has 2 young grand kids. 82 Michelle Garcia MD, 24 [facility]  [address], [address] [address],[address]                [address] [facility] [address], [address] [place] [address], [address] [place] [address]  Ph: [phone]                                                              [phone]           We discussed the expected course, resolution and complications of the diagnosis(es) in detail. Medication risks, benefits, costs, interactions, and alternatives were discussed as indicated. I advised him to contact the office if his condition worsens, changes or fails to improve as anticipated. He expressed understanding with the diagnosis(es) and plan    Gina Gonzalez MD    Greater than 20 minutes was spent in direct video patient care, planning and chart review. This visit was conducted using [facility]. Me telemedicine services.

## 2020-05-28 ENCOUNTER — HOSPITAL ENCOUNTER (OUTPATIENT)
Dept: MRI IMAGING | Age: 70
Discharge: HOME | End: 2020-05-28
Attending: FAMILY MEDICINE
Payer: COMMERCIAL

## 2020-05-28 DIAGNOSIS — M25.562 KNEE PAIN, LEFT: ICD-10-CM

## 2020-05-28 PROCEDURE — 73721 MRI JNT OF LWR EXTRE W/O DYE: CPT

## 2020-07-02 ENCOUNTER — OFFICE VISIT (OUTPATIENT)
Dept: CARDIOLOGY CLINIC | Age: 70
End: 2020-07-02

## 2020-07-02 VITALS
HEART RATE: 76 BPM | WEIGHT: 242 LBS | DIASTOLIC BLOOD PRESSURE: 78 MMHG | OXYGEN SATURATION: 99 % | BODY MASS INDEX: 40.32 KG/M2 | SYSTOLIC BLOOD PRESSURE: 128 MMHG | HEIGHT: 65 IN

## 2020-07-02 DIAGNOSIS — I10 ESSENTIAL HYPERTENSION: Primary | ICD-10-CM

## 2020-07-02 DIAGNOSIS — I25.118 CORONARY ARTERY DISEASE OF NATIVE ARTERY OF NATIVE HEART WITH STABLE ANGINA PECTORIS (HCC): ICD-10-CM

## 2020-07-02 DIAGNOSIS — E78.5 DYSLIPIDEMIA: ICD-10-CM

## 2020-07-02 RX ORDER — SEMAGLUTIDE 1.34 MG/ML
1 INJECTION, SOLUTION SUBCUTANEOUS
COMMUNITY
End: 2022-05-25

## 2020-07-02 RX ORDER — ROSUVASTATIN CALCIUM 20 MG/1
20 TABLET, COATED ORAL
Qty: 90 TAB | Refills: 3 | Status: SHIPPED | OUTPATIENT
Start: 2020-07-02 | End: 2021-08-16 | Stop reason: SDUPTHER

## 2020-07-02 NOTE — PROGRESS NOTES
Makayla Silverman MD. [facility]              Patient: Anson Santana  : 1950      Today's Date: 2020          HISTORY OF PRESENT ILLNESS:     History of Present Illness:  Doing well. No complaints. No CP or SOB. No dizziness. BP OK. PAST MEDICAL HISTORY:     Past Medical History:   Diagnosis Date    CAD (coronary artery disease)     NSTEMI 3/21/15; CABG 3/24/15;  LVEF 55%    [facility]     Dyslipidemia     FH ischemic heart disease     HTN (hypertension)     Hypertension     NSTEMI (non-ST elevated myocardial infarction) ([facility]     NSTEMI 3/21/15    Obesity     MING (obstructive sleep apnea)     surgery in past    MING (obstructive sleep apnea)     on CPAP    Paronychia of great toe of right foot     S/P CABG x 4     CABG 3/24/15 - LIMA TO LAD, SEQUENTIAL VEIN GRAFT TO OM1, OM2; SVG TO DISTAL RCA         Past Surgical History:   Procedure Laterality Date    HX APPENDECTOMY      HX HEENT      eyes           MEDICATIONS:     Current Outpatient Medications   Medication Sig Dispense Refill    semaglutide (Ozempic) 1 mg/dose (2 mg/1.5 mL) sub-q pen 1 mg by SubCUTAneous route every seven (7) days.  carvediloL (COREG) 25 mg tablet TAKE 1 & 1/2 (ONE & ONE-HALF) TABLETS BY MOUTH TWICE DAILY WITH MEALS 270 Tab 1    eplerenone (INSPRA) 25 mg tablet Take 1 tablet by mouth once daily 90 Tab 0    CARTIA  mg ER capsule TAKE 1 CAPSULE BY MOUTH ONCE DAILY 90 Cap 3    insulin glargine U-300 conc (TOUJEO MAX U-300 SOLOSTAR) 300 unit/mL (3 mL) inpn 220 Units by SubCUTAneous route nightly.  lisinopril-hydroCHLOROthiazide (PRINZIDE, ZESTORETIC) 20-12.5 mg per tablet Take 2 Tabs by mouth daily.  acetaminophen (TYLENOL) 500 mg tablet Take 1,000 mg by mouth every six (6) hours as needed for Pain.  rosuvastatin (CRESTOR) 10 mg tablet Take 10 mg by mouth nightly.  omeprazole (PRILOSEC) 20 mg capsule Take 20 mg by mouth daily.       Azelastine (ASTEPRO) 0.15 % (205.5 mcg) nasal spray 1 Spray daily.  montelukast (SINGULAIR) 10 mg tablet Take 10 mg by mouth every evening.  dapagliflozin (FARXIGA) 10 mg tab Take 10 mg by mouth daily.  cetirizine (ZYRTEC) 10 mg tablet Take 10 mg by mouth daily as needed for Allergies.  aspirin delayed-release 81 mg tablet Take 81 mg by mouth daily.  linagliptin (TRADJENTA) 5 mg tablet Take 5 mg by mouth nightly. Allergies   Allergen Reactions    Amoxicillin Hives           SOCIAL HISTORY:     Social History     Tobacco Use    Smoking status: Never Smoker    Smokeless tobacco: Never Used   Substance Use Topics    Alcohol use: Yes     Alcohol/week: 0.0 standard drinks     Comment: rarely    Drug use: No         FAMILY HISTORY:     Family History   Problem Relation Age of Onset    Coronary Artery Disease Mother     Coronary Artery Disease Father            REVIEW OF SYMPTOMS:     Review of Symptoms:  Constitutional: Negative for fever, chills  HEENT: Negative for nosebleeds, tinnitus, and vision changes. Respiratory: Negative for cough, wheezing  Cardiovascular: Negative for orthopnea, claudication, syncope, and PND. Gastrointestinal: Negative for abdominal pain, diarrhea, melena. Genitourinary: Negative for dysuria  Musculoskeletal: Negative for myalgias. Skin: Negative for rash  Heme: No problems bleeding. Neurological: Negative for speech change and focal weakness. PHYSICAL EXAM:     Physical Exam:  Visit Vitals  /78 (BP 1 Location: Left arm, BP Patient Position: Sitting)   Pulse 76   Ht 5' 5\" (1.651 m)   Wt 242 lb (109.8 kg)   SpO2 99%   BMI 40.27 kg/m²     Patient appears generally well, mood and affect are appropriate and pleasant. HEENT:  Hearing intact, non-icteric, normocephalic, atraumatic. Neck Exam: Supple, No JVD or carotid bruits. Lung Exam: Clear to auscultation, even breath sounds.    Cardiac Exam: Regular rate and rhythm with no murmur or rub  Abdomen: Soft, non-tender, normal bowel sounds. No bruits or masses. Extremities: Moves all ext well. No lower extremity edema. Vascular: 2+ dorsalis pedis pulses bilaterally. Psych: Appropriate affect  Neuro - Grossly intact        LABS / OTHER STUDIES:     Lab Results   Component Value Date/Time    Sodium 139 07/08/2019 02:04 AM    Potassium 4.2 07/08/2019 02:04 AM    Chloride 106 07/08/2019 02:04 AM    CO2 26 07/08/2019 02:04 AM    Anion gap 7 07/08/2019 02:04 AM    Glucose 130 (H) 07/08/2019 02:04 AM    BUN 16 07/08/2019 02:04 AM    Creatinine 0.97 07/08/2019 02:04 AM    BUN/Creatinine ratio 16 07/08/2019 02:04 AM    GFR est AA >60 07/08/2019 02:04 AM    GFR est non-AA >60 07/08/2019 02:04 AM    Calcium 8.6 07/08/2019 02:04 AM    Bilirubin, total 0.6 07/08/2019 02:04 AM    Alk.  phosphatase 65 07/08/2019 02:04 AM    Protein, total 6.9 07/08/2019 02:04 AM    Albumin 3.0 (L) 07/08/2019 02:04 AM    Globulin 3.9 07/08/2019 02:04 AM    A-G Ratio 0.8 (L) 07/08/2019 02:04 AM    ALT (SGPT) 32 07/08/2019 02:04 AM    AST (SGOT) 26 07/08/2019 02:04 AM     Lab Results   Component Value Date/Time    WBC 9.0 07/08/2019 02:04 AM    Hemoglobin (POC) 15.6 03/21/2015 09:39 AM    HGB 13.2 07/08/2019 02:04 AM    Hematocrit (POC) 46 03/21/2015 09:39 AM    HCT 40.5 07/08/2019 02:04 AM    PLATELET 140 15/53/8700 02:04 AM    MCV 89.0 07/08/2019 02:04 AM       Lab Results   Component Value Date/Time    Cholesterol, total 121 03/22/2015 02:45 AM    HDL Cholesterol 26 03/22/2015 02:45 AM    LDL, calculated 41 03/22/2015 02:45 AM    VLDL, calculated 54 03/22/2015 02:45 AM    Triglyceride 270 (H) 03/22/2015 02:45 AM    CHOL/HDL Ratio 4.7 03/22/2015 02:45 AM       Lab Results   Component Value Date/Time    TSH 1.74 03/23/2015 10:02 PM            CARDIAC DIAGNOSTICS:      Cardiac Evaluation Includes:        ECHO: 3/21/2015: EF 55-60%, no WMA, mild LVH, MAC, no MR    CATH: 3/23/2015: Obstructive 3VD:dLM:80%, mLAD:80%, dLAD:70%, D1:90% mLCX: 90%, mRCA:50%, PDA: 80%, EF 55%, Focal mid inferior AK. Intra-op WILEY 3/24/15 - postop LVEF 55%, mild MR  CABG 3/24/15 - LIMA TO LAD, SEQUENTIAL VEIN GRAFT TO OM1, OM2; SVG TO DISTAL RCA      EKG 3/21/15 - NSR, diffuse ST depression   EKG 5/16/16 - NSR, NSST changes   EKG 5/16/16 - NSR, NSST changes, PVC's   EKG 5/8/19 - NSR, non-specific T wave abn   EKG 7/2/20 - NSR, non-specific T wave abn          ASSESSMENT AND PLAN:         Assessment and Plan:  1) CAD  - NSTEMI 3/21/15; CABG 3/24/15; LVEF 55%  - Mr. Santana is doing well s/p CABG - no anginal complaints   - Continue BB, statin, ACE-I, and ASA  - I encouraged he exercise and eat healthy       2) HTN    - on multiple meds for his BP  - On 5/11/20 - BP a little high - 147/94 at home today --- have him work on diet and exercise and will see him in the office in a month   - On 7/2/20 - BP looks good - BP at home has looked fine past 2 months. Will continue regimen as is   - BP goal < 130/80 ideally         3) Dyslipidemia    - continue Crestor - will increase Crrestor to 20 mg daily (more potent dose)   - Lipids checked per PCP       4) Diabetes Mellitus  - working with PCP       5) See me in 12 months.   Patient expressed understanding of the plan - questions were answered.      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 Rayray Gonzalez MD, 24 [facility]  [address], [address] [address]                [address] [facility]  [address], [address] [place] [address], [address] [place] [address]  Ph: [phone]                                                             [phone]

## 2020-07-02 NOTE — PROGRESS NOTES
Olamide Hills is a 77 y.o. male    Visit Vitals  /78 (BP 1 Location: Left arm, BP Patient Position: Sitting)   Pulse 76   Ht 5' 5\" (1.651 m)   Wt 242 lb (109.8 kg)   SpO2 99%   BMI 40.27 kg/m²       Chief Complaint   Patient presents with    Hypertension    Coronary Artery Disease       Chest pain no  SOB no  Dizziness no  Swelling no  Recent hospital visit no  Refills no

## 2020-08-26 DIAGNOSIS — E11.9 TYPE 2 DIABETES MELLITUS WITHOUT COMPLICATION, WITH LONG-TERM CURRENT USE OF INSULIN (HCC): Chronic | ICD-10-CM

## 2020-08-26 DIAGNOSIS — I10 ESSENTIAL HYPERTENSION: Chronic | ICD-10-CM

## 2020-08-26 DIAGNOSIS — Z79.4 TYPE 2 DIABETES MELLITUS WITHOUT COMPLICATION, WITH LONG-TERM CURRENT USE OF INSULIN (HCC): Chronic | ICD-10-CM

## 2020-08-26 RX ORDER — EPLERENONE 25 MG/1
TABLET, FILM COATED ORAL
Qty: 90 TAB | Refills: 0 | Status: SHIPPED | OUTPATIENT
Start: 2020-08-26 | End: 2020-11-25

## 2020-11-19 RX ORDER — HYDROMORPHONE HYDROCHLORIDE 1 MG/ML
.5-1 INJECTION, SOLUTION INTRAMUSCULAR; INTRAVENOUS; SUBCUTANEOUS
Status: CANCELLED | OUTPATIENT
Start: 2020-11-19

## 2020-11-19 RX ORDER — SODIUM CHLORIDE, SODIUM LACTATE, POTASSIUM CHLORIDE, CALCIUM CHLORIDE 600; 310; 30; 20 MG/100ML; MG/100ML; MG/100ML; MG/100ML
125 INJECTION, SOLUTION INTRAVENOUS CONTINUOUS
Status: CANCELLED | OUTPATIENT
Start: 2020-11-19

## 2020-11-19 RX ORDER — ONDANSETRON 2 MG/ML
4 INJECTION INTRAMUSCULAR; INTRAVENOUS AS NEEDED
Status: CANCELLED | OUTPATIENT
Start: 2020-11-19

## 2020-11-19 NOTE — PERIOP NOTES
Left a PANKAJ Waller at Dr. Bhandari Market office requesting that orders for surgery be faxed to the Main pre-op.   DOS: 11/20/2020

## 2020-11-20 ENCOUNTER — HOSPITAL ENCOUNTER (OUTPATIENT)
Age: 70
Setting detail: OUTPATIENT SURGERY
Discharge: HOME | End: 2020-11-20
Attending: PODIATRIST | Admitting: PODIATRIST
Payer: COMMERCIAL

## 2020-11-20 ENCOUNTER — APPOINTMENT (OUTPATIENT)
Dept: GENERAL RADIOLOGY | Age: 70
End: 2020-11-20
Attending: PODIATRIST
Payer: COMMERCIAL

## 2020-11-20 VITALS
SYSTOLIC BLOOD PRESSURE: 113 MMHG | WEIGHT: 236 LBS | OXYGEN SATURATION: 98 % | HEART RATE: 77 BPM | BODY MASS INDEX: 39.32 KG/M2 | TEMPERATURE: 98.4 F | DIASTOLIC BLOOD PRESSURE: 54 MMHG | RESPIRATION RATE: 18 BRPM | HEIGHT: 65 IN

## 2020-11-20 PROBLEM — M86.9 OSTEOMYELITIS OF THIRD TOE OF LEFT FOOT (HCC): Status: ACTIVE | Noted: 2020-11-20

## 2020-11-20 LAB
GLUCOSE BLD STRIP.AUTO-MCNC: 126 MG/DL (ref 65–100)
SERVICE CMNT-IMP: ABNORMAL

## 2020-11-20 PROCEDURE — 76210000020 HC REC RM PH II FIRST 0.5 HR: Performed by: PODIATRIST

## 2020-11-20 PROCEDURE — 2709999900 HC NON-CHARGEABLE SUPPLY: Performed by: PODIATRIST

## 2020-11-20 PROCEDURE — 88305 TISSUE EXAM BY PATHOLOGIST: CPT

## 2020-11-20 PROCEDURE — 73620 X-RAY EXAM OF FOOT: CPT

## 2020-11-20 PROCEDURE — 88311 DECALCIFY TISSUE: CPT

## 2020-11-20 PROCEDURE — 77030002916 HC SUT ETHLN J&J -A: Performed by: PODIATRIST

## 2020-11-20 PROCEDURE — 74011000250 HC RX REV CODE- 250: Performed by: PODIATRIST

## 2020-11-20 PROCEDURE — 76010000138 HC OR TIME 0.5 TO 1 HR: Performed by: PODIATRIST

## 2020-11-20 PROCEDURE — 77030040922 HC BLNKT HYPOTHRM STRY -A

## 2020-11-20 PROCEDURE — 77030018836 HC SOL IRR NACL ICUM -A: Performed by: PODIATRIST

## 2020-11-20 PROCEDURE — 77030039497 HC CST PAD STERILE CHCS -A: Performed by: PODIATRIST

## 2020-11-20 PROCEDURE — 77030002933 HC SUT MCRYL J&J -A: Performed by: PODIATRIST

## 2020-11-20 PROCEDURE — 77030040361 HC SLV COMPR DVT MDII -B

## 2020-11-20 PROCEDURE — 77030000032 HC CUF TRNQT ZIMM -B: Performed by: PODIATRIST

## 2020-11-20 PROCEDURE — 82962 GLUCOSE BLOOD TEST: CPT

## 2020-11-20 PROCEDURE — 77030006773 HC BLD SAW OSC BRSM -A: Performed by: PODIATRIST

## 2020-11-20 RX ORDER — BUPIVACAINE HYDROCHLORIDE 5 MG/ML
INJECTION, SOLUTION EPIDURAL; INTRACAUDAL AS NEEDED
Status: DISCONTINUED | OUTPATIENT
Start: 2020-11-20 | End: 2020-11-20 | Stop reason: HOSPADM

## 2020-11-20 RX ORDER — SODIUM CHLORIDE, SODIUM LACTATE, POTASSIUM CHLORIDE, CALCIUM CHLORIDE 600; 310; 30; 20 MG/100ML; MG/100ML; MG/100ML; MG/100ML
125 INJECTION, SOLUTION INTRAVENOUS CONTINUOUS
Status: DISCONTINUED | OUTPATIENT
Start: 2020-11-20 | End: 2020-11-20 | Stop reason: HOSPADM

## 2020-11-20 RX ORDER — LIDOCAINE HYDROCHLORIDE 10 MG/ML
0.1 INJECTION, SOLUTION EPIDURAL; INFILTRATION; INTRACAUDAL; PERINEURAL AS NEEDED
Status: DISCONTINUED | OUTPATIENT
Start: 2020-11-20 | End: 2020-11-20 | Stop reason: HOSPADM

## 2020-11-20 NOTE — DISCHARGE INSTRUCTIONS
Dr. Bryson Hilliard. ASTRID Sumner FACFAS FACFAOM FACCWS    The [facility]  Post Operative Instructions: You have had a surgical procedure on your left foot. Fluids and Diet:  Begin with clear liquids, broth, dry toast, and crackers. If not nauseated then resume your regular pre-operative diet when you are ready    Medications: Take your prescriptions as directed  If your pain is not severe then you may take the non-prescription medication that you normally take for aches and pains  You may resume your regularly scheduled medications (unless otherwise directed)  If any side effects or adverse reactions occur, discontinue the medication and contact your doctor. Review the patient drug information that is provided before you take any medication    Ambulation and Activity:  You are advised to go directly home from the hospital  You may put weight on the operated foot. You should wear the surgical shoe at all times when awake. Avoid stairs if possible. Do not lift or move heavy objects  Do not drive until cleared by Dr. Abelardo Bonilla and Wound Care Instructions:  Keep bandage clean and dry  Do not shower or bathe the operative extremity  Do not remove the bandage (unless otherwise directed)  Do not attempt to put anything between the cast or dressing and your skin, some itching is normal.    Ice and Elevation:  Elevate operative extremity as much as possible to reduce swelling and discomfort. Elevate with 2 pillows at or above the level of the heart for the first 72 hours. Special Instructions: Call your doctor immediately if you develop any of the following. Fever over 100 degrees by mouth - take your temperature daily until your first follow up visit. Pain not relieved by medication ordered  Swelling, increased redness, warmth, or hardness around operative area. Numb, tingling or cold toes.   Toe(s) become white or bluish  Bandage becomes wet, soiled, or blood soaked (small amount of bleeding may be normal)  Increased or progressive drainage from surgical area. Follow up instructions: Your first post operative appointment is scheduled for Tuesday    Call Dr. Remberto Landin office if you have any questions or concerns.

## 2020-11-20 NOTE — BRIEF OP NOTE
Brief Postoperative Note    Patient: Roxana Santana  YOB: 1950  MRN: [id number]    Date of Procedure: 11/20/2020     Pre-Op Diagnosis: OSTEOMYLITSIS LEFT 3RD TOE    Post-Op Diagnosis: Same as preoperative diagnosis. Procedure(s):  AMPUTATION LEFT 3RD TOE (URGENT)    Surgeon(s):  Prabha Sumner DPM    Surgical Assistant: Surg Asst-1: Saad Montano    Anesthesia: Local     Estimated Blood Loss (mL): Minimal    Complications: None    Specimens:   ID Type Source Tests Collected by Time Destination   1 : Left Foot, 3rd Toe Amputation Preservative Toe  Prabha Sumner DPM 11/20/2020 4252 Pathology        Implants: * No implants in log *    Drains: * No LDAs found *    Findings: crumbling bone distal phalanx.     Electronically Signed by Claudia Medina DPM on 11/20/2020 at 8:09 AM

## 2020-11-20 NOTE — H&P
The [address] [place] [address] Podiatric Surgery  H & P Note    Date: November 20, 2020  Patient: Kevin Santana  MR #: [id number]  [facility] #: [id number]  Attending/Admitting Physician: Dr Elden Gottron, ASTRID        History of Present Illness:  Patient is a 72 y.o. male presents today for management of left 3rd toe infection. Clinical exam and imaging shows osteomyelitis. ROS:   Constitutional: Negative for fever, chills, weight loss and malaise/fatigue. HENT: Negative for nosebleeds and congestion. Eyes: Negative for blurred vision and double vision. Respiratory: Negative for cough, shortness of breath and wheezing. Cardiovascular: Negative for chest pain, palpitations, claudication and positive for leg swelling. Gastrointestinal: Negative for heartburn, nausea, vomiting, abdominal pain and diarrhea. Genitourinary: Negative for dysuria and urgency. Musculoskeletal: Negative for myalgias and joint pain. Skin: Negative for itching and rash. Positive for ulceration. Neurological: Negative for dizziness, focal weakness and headaches. Positive for numbness  Endo/Heme/Allergies: Negative for environmental allergies. Does not bruise/bleed easily. Psychiatric/Behavioral: Negative for depression and suicidal ideas. The patient is not nervous/anxious. Blood pressure (!) 117/50, pulse 77, temperature 98.2 °F (36.8 °C), resp. rate 16, height 5' 5\" (1.651 m), weight 107 kg (236 lb), SpO2 99 %.   No intake or output data in the 24 hours ending 11/20/20 0708      Medical History:  Past Medical History:   Diagnosis Date    CAD (coronary artery disease)     NSTEMI 3/21/15; CABG 3/24/15;  LVEF 55%    Diabetes [facility]     Dyslipidemia     FH ischemic heart disease     HTN (hypertension)     Hypertension     NSTEMI (non-ST elevated myocardial infarction) ([facility]     NSTEMI 3/21/15    Obesity     MING (obstructive sleep apnea)     surgery in past    MING (obstructive sleep apnea)     on CPAP    [facility] toe of right foot     S/P CABG x 4     CABG 3/24/15 - LIMA TO LAD, SEQUENTIAL VEIN GRAFT TO OM1, OM2; SVG TO DISTAL RCA     Past Surgical History:   Procedure Laterality Date    HX APPENDECTOMY      HX HEENT      eyes     [unfilled]  Allergies   Allergen Reactions    Amoxicillin Hives     Family History   Problem Relation Age of Onset    Coronary Artery Disease Mother     Coronary Artery Disease Father      Social History     Tobacco Use   Smoking Status Never Smoker   Smokeless Tobacco Never Used     Social History     Substance and Sexual Activity   Drug Use No     Social History     Substance and Sexual Activity   Alcohol Use Yes    Alcohol/week: 0.0 standard drinks    Comment: rarely       Labs:  Lab Results   Component Value Date/Time    WBC 9.0 07/08/2019 02:04 AM    HGB 13.2 07/08/2019 02:04 AM    HCT 40.5 07/08/2019 02:04 AM    MCV 89.0 07/08/2019 02:04 AM    PLATELET 691 46/95/5340 02:04 AM     Lab Results   Component Value Date/Time    Sodium 139 07/08/2019 02:04 AM    Potassium 4.2 07/08/2019 02:04 AM    Chloride 106 07/08/2019 02:04 AM    CO2 26 07/08/2019 02:04 AM    Phosphorus 3.2 07/05/2019 02:48 AM    BUN 16 07/08/2019 02:04 AM    Calcium 8.6 07/08/2019 02:04 AM      Lab Results   Component Value Date/Time    Glucose 130 (H) 07/08/2019 02:04 AM     Lab Results   Component Value Date/Time    INR 1.2 (H) 03/26/2015 04:20 AM    No components found for: PTT  No results for input(s): GLU in the last 72 hours. No lab exists for component: POCGLU, POCGMD    Physical Exam:  General appearance: alert, cooperative, no distress, appears stated age    Lungs: clear to auscultation bilaterally  Heart: regular rate and rhythm, S1, S2 normal, no murmur, click, rub or gallop  Abdomen: soft, non-tender.  Bowel sounds normal. No masses,  no organomegaly  Pulses: 2+ and symmetric      Lower Extremity Exam:  Vascular:    Dorsalis Pedis Pulse: present  Posterior Tibialis Pulse: present  Popliteal and Femoral Pulses: present  Skin Temp: warm to warm left and right  Extremity Edema: non pitting  Varicosities: present    Neurological:  Deep Tendon Reflexes of Achilles and Patellar: intact right and left  Epicritic and Protective Sensations: absent right and left  Deep Pain Response: absent right and left    Dermatological:  Toenails: mycotic toes 1-5 right and left  Ulceration: left 3rd toe probes to bone      Musculoskeletal:  Charcot foot left. Prior resection left 5th metatarsal head      Impression:  1. Osteomyelitis left 3rd toe. Plan:  1. Discussed and recommended left 3rd toe amputation. The nature of the finding, probable diagnosis and likely treatment was thoroughly discussed with the patient. The options, risks, complications, alternative treatment as well as some of the differential diagnosis was discussed. The patient was thoroughly informed and all questions were answered. the patient indicated understanding and satisfaction with the discussion. The patient was counseled at length about the risks of surya Covid-19 during their perioperative period and any recovery window from their procedure. The patient was made aware that surya Covid-19  may worsen their prognosis for recovering from their procedure and lend to a higher morbidity and/or mortality risk. All material risks, benefits, and reasonable alternatives including postponing the procedure were discussed. The patient does  wish to proceed with the procedure at this time. Pastora Bonilla.  ASTRID Sumner FACFAS [facility] Board Certified Foot & Ankle Specialist  [phone] cell  11/20/2020  7:08 AM

## 2020-11-20 NOTE — OP NOTES
Operative Report    Patient: Chetan Sparrow MRN: [id number]  SSN: [id number]    YOB: 1950  Age: 72 y.o. Sex: male       Date of Surgery: 11/20/2020     Preoperative Diagnosis: OSTEOMYELITSIS LEFT 3RD TOE     Postoperative Diagnosis: OSTEOMYELITSIS LEFT 3RD TOE     Surgeon(s) and Role:     * Yeni Sumner DPM - Primary    Anesthesia: Local     Procedure: Procedure(s):  AMPUTATION LEFT 3RD TOE (URGENT)     Justification of Procedure: Patient is a 80-year-old male diabetic with previous infections and ulceration who presented to my office with a new wound to the left third toe. We attempted nonsurgical management with antibiotics but the wound worsened with exposed and fragmented bone to the distal phalanx. Due to these findings I recommended surgical resection of the infection with a partial amputation of the toe. I discussed the procedures at length including the expected postoperative course, risks, alternatives, and possible complications. No guarantees were given and the patient elected to proceed. Procedure in Detail: This 80-year-old male was brought the operating room and placed supine on the operating room table. Next, the left lower extremity was prepped and draped in usual aseptic fashion. A local infiltrated block was performed using 0.5% Marcaine plane. Next attention was directed to the left third toe which was edematous and erythematous with a distal ulceration extending the bone. Two elliptical incisions were made to the medial and lateral aspects of the left third toe which converge dorsally and plantarly. The incisions were made down to bone and the toe was disarticulated at the proximal interphalangeal joint. Soft tissue attachments were resected from the proximal phalanx distal half. Next the proximal phalanx distal half was resected using power saw. The wound was then irrigated with copious amounts of normal saline. Wound was then closed in layers.  Patient tolerated the procedure and anesthesia well. He was then transferred to the PACU after application of a postoperative dressing. He will be discharged home and follow up in my office next week. Estimated Blood Loss:  2mL    Tourniquet Time: * No tourniquets in log *      Implants: * No implants in log *            Specimens:   ID Type Source Tests Collected by Time Destination   1 : Left Foot, 3rd Toe Amputation Preservative Toe  Lissette Sumner DPM 11/20/2020 5918 Pathology           Drains: None                Complications: None    Counts: Sponge and needle counts were correct times two.     Signed By:  Cassidy Ochoa DPM     November 20, 2020

## 2020-11-25 DIAGNOSIS — I10 ESSENTIAL HYPERTENSION: Chronic | ICD-10-CM

## 2020-11-25 DIAGNOSIS — E11.9 TYPE 2 DIABETES MELLITUS WITHOUT COMPLICATION, WITH LONG-TERM CURRENT USE OF INSULIN (HCC): Chronic | ICD-10-CM

## 2020-11-25 DIAGNOSIS — Z79.4 TYPE 2 DIABETES MELLITUS WITHOUT COMPLICATION, WITH LONG-TERM CURRENT USE OF INSULIN (HCC): Chronic | ICD-10-CM

## 2020-11-25 RX ORDER — EPLERENONE 25 MG/1
TABLET, FILM COATED ORAL
Qty: 90 TAB | Refills: 0 | Status: SHIPPED | OUTPATIENT
Start: 2020-11-25 | End: 2021-03-15

## 2021-03-15 DIAGNOSIS — I10 ESSENTIAL HYPERTENSION: Chronic | ICD-10-CM

## 2021-03-15 DIAGNOSIS — E11.9 TYPE 2 DIABETES MELLITUS WITHOUT COMPLICATION, WITH LONG-TERM CURRENT USE OF INSULIN (HCC): Chronic | ICD-10-CM

## 2021-03-15 DIAGNOSIS — Z79.4 TYPE 2 DIABETES MELLITUS WITHOUT COMPLICATION, WITH LONG-TERM CURRENT USE OF INSULIN (HCC): Chronic | ICD-10-CM

## 2021-03-15 RX ORDER — EPLERENONE 25 MG/1
TABLET, FILM COATED ORAL
Qty: 90 TAB | Refills: 0 | Status: SHIPPED | OUTPATIENT
Start: 2021-03-15 | End: 2021-07-20

## 2021-07-20 DIAGNOSIS — E11.9 TYPE 2 DIABETES MELLITUS WITHOUT COMPLICATION, WITH LONG-TERM CURRENT USE OF INSULIN (HCC): Chronic | ICD-10-CM

## 2021-07-20 DIAGNOSIS — I10 ESSENTIAL HYPERTENSION: Chronic | ICD-10-CM

## 2021-07-20 DIAGNOSIS — Z79.4 TYPE 2 DIABETES MELLITUS WITHOUT COMPLICATION, WITH LONG-TERM CURRENT USE OF INSULIN (HCC): Chronic | ICD-10-CM

## 2021-07-20 RX ORDER — EPLERENONE 25 MG/1
TABLET, FILM COATED ORAL
Qty: 90 TABLET | Refills: 0 | Status: SHIPPED | OUTPATIENT
Start: 2021-07-20 | End: 2021-08-16 | Stop reason: SDUPTHER

## 2021-08-16 ENCOUNTER — OFFICE VISIT (OUTPATIENT)
Dept: CARDIOLOGY CLINIC | Age: 71
End: 2021-08-16
Payer: COMMERCIAL

## 2021-08-16 VITALS
HEART RATE: 64 BPM | WEIGHT: 246 LBS | HEIGHT: 65 IN | OXYGEN SATURATION: 94 % | SYSTOLIC BLOOD PRESSURE: 138 MMHG | BODY MASS INDEX: 40.98 KG/M2 | DIASTOLIC BLOOD PRESSURE: 72 MMHG

## 2021-08-16 DIAGNOSIS — E11.9 TYPE 2 DIABETES MELLITUS WITHOUT COMPLICATION, WITH LONG-TERM CURRENT USE OF INSULIN (HCC): Chronic | ICD-10-CM

## 2021-08-16 DIAGNOSIS — Z79.4 TYPE 2 DIABETES MELLITUS WITHOUT COMPLICATION, WITH LONG-TERM CURRENT USE OF INSULIN (HCC): Chronic | ICD-10-CM

## 2021-08-16 DIAGNOSIS — I10 ESSENTIAL HYPERTENSION: ICD-10-CM

## 2021-08-16 DIAGNOSIS — I25.10 CORONARY ARTERY DISEASE INVOLVING NATIVE CORONARY ARTERY OF NATIVE HEART WITHOUT ANGINA PECTORIS: Primary | Chronic | ICD-10-CM

## 2021-08-16 DIAGNOSIS — E78.5 DYSLIPIDEMIA: ICD-10-CM

## 2021-08-16 PROCEDURE — 3017F COLORECTAL CA SCREEN DOC REV: CPT | Performed by: SPECIALIST

## 2021-08-16 PROCEDURE — G8536 NO DOC ELDER MAL SCRN: HCPCS | Performed by: SPECIALIST

## 2021-08-16 PROCEDURE — 3046F HEMOGLOBIN A1C LEVEL >9.0%: CPT | Performed by: SPECIALIST

## 2021-08-16 PROCEDURE — G8510 SCR DEP NEG, NO PLAN REQD: HCPCS | Performed by: SPECIALIST

## 2021-08-16 PROCEDURE — 2022F DILAT RTA XM EVC RTNOPTHY: CPT | Performed by: SPECIALIST

## 2021-08-16 PROCEDURE — G8754 DIAS BP LESS 90: HCPCS | Performed by: SPECIALIST

## 2021-08-16 PROCEDURE — G8417 CALC BMI ABV UP PARAM F/U: HCPCS | Performed by: SPECIALIST

## 2021-08-16 PROCEDURE — 99214 OFFICE O/P EST MOD 30 MIN: CPT | Performed by: SPECIALIST

## 2021-08-16 PROCEDURE — 93000 ELECTROCARDIOGRAM COMPLETE: CPT | Performed by: SPECIALIST

## 2021-08-16 PROCEDURE — G8427 DOCREV CUR MEDS BY ELIG CLIN: HCPCS | Performed by: SPECIALIST

## 2021-08-16 PROCEDURE — G8752 SYS BP LESS 140: HCPCS | Performed by: SPECIALIST

## 2021-08-16 PROCEDURE — 1101F PT FALLS ASSESS-DOCD LE1/YR: CPT | Performed by: SPECIALIST

## 2021-08-16 RX ORDER — CARVEDILOL 25 MG/1
37.5 TABLET ORAL 2 TIMES DAILY
Qty: 270 TABLET | Refills: 3 | Status: SHIPPED | OUTPATIENT
Start: 2021-08-16 | End: 2022-08-17 | Stop reason: SDUPTHER

## 2021-08-16 RX ORDER — ROSUVASTATIN CALCIUM 20 MG/1
20 TABLET, COATED ORAL
Qty: 90 TABLET | Refills: 3 | Status: SHIPPED | OUTPATIENT
Start: 2021-08-16

## 2021-08-16 RX ORDER — EPLERENONE 25 MG/1
25 TABLET, FILM COATED ORAL DAILY
Qty: 90 TABLET | Refills: 3 | Status: SHIPPED | OUTPATIENT
Start: 2021-08-16 | End: 2022-08-22 | Stop reason: SDUPTHER

## 2021-08-16 RX ORDER — LISINOPRIL AND HYDROCHLOROTHIAZIDE 12.5; 2 MG/1; MG/1
2 TABLET ORAL DAILY
Qty: 180 TABLET | Refills: 3 | Status: SHIPPED | OUTPATIENT
Start: 2021-08-16

## 2021-08-16 RX ORDER — DILTIAZEM HYDROCHLORIDE 240 MG/1
240 CAPSULE, COATED, EXTENDED RELEASE ORAL DAILY
Qty: 90 CAPSULE | Refills: 3 | Status: SHIPPED | OUTPATIENT
Start: 2021-08-16 | End: 2022-08-22 | Stop reason: SDUPTHER

## 2021-08-16 NOTE — PROGRESS NOTES
Chief Complaint   Patient presents with    Annual Exam    Hypertension    Coronary Artery Disease    Cholesterol Problem     Visit Vitals  /72 (BP 1 Location: Right upper arm, BP Patient Position: Sitting, BP Cuff Size: Large adult)   Pulse 64   Ht 5' 5\" (1.651 m)   Wt 246 lb (111.6 kg)   SpO2 94%   BMI 40.94 kg/m²     Chest pain denied   SOB denied   Palpitations denied   Swelling in hands/feet denied   Dizziness denied   Recent hospital stays denied   Refills all cards

## 2021-08-16 NOTE — PROGRESS NOTES
Matteo Mays MD. [facility]              Patient: Kolby Santana  : 1950      Today's Date: 2021          HISTORY OF PRESENT ILLNESS:     History of Present Illness:  Doing well. No complaints. No CP or SOB. No dizziness. BP OK. PAST MEDICAL HISTORY:     Past Medical History:   Diagnosis Date    CAD (coronary artery disease)     NSTEMI 3/21/15; CABG 3/24/15;  LVEF 55%    Diabetes ([facility]     Dyslipidemia     FH ischemic heart disease     HTN (hypertension)     Hypertension     NSTEMI (non-ST elevated myocardial infarction) ([facility]     NSTEMI 3/21/15    Obesity     MING (obstructive sleep apnea)     surgery in past    MING (obstructive sleep apnea)     on CPAP    Paronychia of great toe of right foot     S/P CABG x 4     CABG 3/24/15 - LIMA TO LAD, SEQUENTIAL VEIN GRAFT TO OM1, OM2; SVG TO DISTAL RCA         Past Surgical History:   Procedure Laterality Date    HX APPENDECTOMY      HX HEENT      eyes           MEDICATIONS:     Current Outpatient Medications   Medication Sig Dispense Refill    sitagliptin phosphate (JANUVIA PO) Take 100 mg by mouth daily.  eplerenone (INSPRA) 25 mg tablet Take 1 tablet by mouth once daily 90 Tablet 0    semaglutide (Ozempic) 1 mg/dose (2 mg/1.5 mL) sub-q pen 1 mg by SubCUTAneous route every seven (7) days.  rosuvastatin (CRESTOR) 20 mg tablet Take 1 Tab by mouth nightly. 90 Tab 3    carvediloL (COREG) 25 mg tablet TAKE 1 & 1/2 (ONE & ONE-HALF) TABLETS BY MOUTH TWICE DAILY WITH MEALS 270 Tab 1    CARTIA  mg ER capsule TAKE 1 CAPSULE BY MOUTH ONCE DAILY (Patient taking differently: 250 mg daily.) 90 Cap 3    insulin glargine U-300 conc (TOUJEO MAX U-300 SOLOSTAR) 300 unit/mL (3 mL) inpn 220 Units by SubCUTAneous route nightly.  lisinopril-hydroCHLOROthiazide (PRINZIDE, ZESTORETIC) 20-12.5 mg per tablet Take 2 Tabs by mouth daily.       acetaminophen (TYLENOL) 500 mg tablet Take 1,000 mg by mouth every six (6) hours as needed for Pain.  omeprazole (PRILOSEC) 20 mg capsule Take 20 mg by mouth daily.  Azelastine (ASTEPRO) 0.15 % (205.5 mcg) nasal spray 1 Spray daily.  montelukast (SINGULAIR) 10 mg tablet Take 10 mg by mouth every evening.  dapagliflozin (FARXIGA) 10 mg tab Take 10 mg by mouth daily.  cetirizine (ZYRTEC) 10 mg tablet Take 10 mg by mouth daily as needed for Allergies.  aspirin delayed-release 81 mg tablet Take 81 mg by mouth daily. Allergies   Allergen Reactions    Amoxicillin Hives           SOCIAL HISTORY:     Social History     Tobacco Use    Smoking status: Former Smoker     Types: Pipe     Quit date:      Years since quittin.6    Smokeless tobacco: Never Used   Substance Use Topics    Alcohol use: Yes     Alcohol/week: 0.0 standard drinks     Comment: rarely    Drug use: No         FAMILY HISTORY:     Family History   Problem Relation Age of Onset    Coronary Artery Disease Mother     Coronary Artery Disease Father            REVIEW OF SYMPTOMS:     Review of Symptoms:  Constitutional: Negative for fever, chills  HEENT: Negative for nosebleeds, tinnitus, and vision changes. Respiratory: Negative for cough, wheezing  Cardiovascular: Negative for orthopnea, claudication, syncope, and PND. Gastrointestinal: Negative for abdominal pain, diarrhea, melena. Genitourinary: Negative for dysuria  Musculoskeletal: Negative for myalgias. Skin: Negative for rash  Heme: No problems bleeding. Neurological: Negative for speech change and focal weakness. PHYSICAL EXAM:     Physical Exam:  Visit Vitals  /72 (BP 1 Location: Right upper arm, BP Patient Position: Sitting, BP Cuff Size: Large adult)   Pulse 64   Ht 5' 5\" (1.651 m)   Wt 246 lb (111.6 kg)   SpO2 94%   BMI 40.94 kg/m²     Patient appears generally well, mood and affect are appropriate and pleasant. HEENT:  Hearing intact, non-icteric, normocephalic, atraumatic.    Neck Exam: Supple, No JVD or carotid bruits. Lung Exam: Clear to auscultation, even breath sounds. Cardiac Exam: Regular rate and rhythm with no murmur or rub  Abdomen: Soft, non-tender, normal bowel sounds. Obese   Extremities: Moves all ext well. No lower extremity edema. Vascular: 2+ dorsalis pedis pulses bilaterally. Psych: Appropriate affect  Neuro - Grossly intact        LABS / OTHER STUDIES:     Lab Results   Component Value Date/Time    Sodium 139 07/08/2019 02:04 AM    Potassium 4.2 07/08/2019 02:04 AM    Chloride 106 07/08/2019 02:04 AM    CO2 26 07/08/2019 02:04 AM    Anion gap 7 07/08/2019 02:04 AM    Glucose 130 (H) 07/08/2019 02:04 AM    BUN 16 07/08/2019 02:04 AM    Creatinine 0.97 07/08/2019 02:04 AM    BUN/Creatinine ratio 16 07/08/2019 02:04 AM    GFR est AA >60 07/08/2019 02:04 AM    GFR est non-AA >60 07/08/2019 02:04 AM    Calcium 8.6 07/08/2019 02:04 AM    Bilirubin, total 0.6 07/08/2019 02:04 AM    Alk.  phosphatase 65 07/08/2019 02:04 AM    Protein, total 6.9 07/08/2019 02:04 AM    Albumin 3.0 (L) 07/08/2019 02:04 AM    Globulin 3.9 07/08/2019 02:04 AM    A-G Ratio 0.8 (L) 07/08/2019 02:04 AM    ALT (SGPT) 32 07/08/2019 02:04 AM    AST (SGOT) 26 07/08/2019 02:04 AM     Lab Results   Component Value Date/Time    WBC 9.0 07/08/2019 02:04 AM    Hemoglobin (POC) 15.6 03/21/2015 09:39 AM    HGB 13.2 07/08/2019 02:04 AM    Hematocrit (POC) 46 03/21/2015 09:39 AM    HCT 40.5 07/08/2019 02:04 AM    PLATELET 757 65/04/5952 02:04 AM    MCV 89.0 07/08/2019 02:04 AM       Lab Results   Component Value Date/Time    Cholesterol, total 121 03/22/2015 02:45 AM    HDL Cholesterol 26 03/22/2015 02:45 AM    LDL, calculated 41 03/22/2015 02:45 AM    VLDL, calculated 54 03/22/2015 02:45 AM    Triglyceride 270 (H) 03/22/2015 02:45 AM    CHOL/HDL Ratio 4.7 03/22/2015 02:45 AM       Lab Results   Component Value Date/Time    TSH 1.74 03/23/2015 10:02 PM            CARDIAC DIAGNOSTICS:      Cardiac Evaluation Includes:        ECHO: 3/21/2015: EF 55-60%, no WMA, mild LVH, MAC, no MR    CATH: 3/23/2015: Obstructive 3VD:dLM:80%, mLAD:80%, dLAD:70%, D1:90% mLCX: 90%, mRCA:50%, PDA: 80%, EF 55%, Focal mid inferior AK. Intra-op WILEY 3/24/15 - postop LVEF 55%, mild MR  CABG 3/24/15 - LIMA TO LAD, SEQUENTIAL VEIN GRAFT TO OM1, OM2; SVG TO DISTAL RCA      EKG 3/21/15 - NSR, diffuse ST depression   EKG 5/16/16 - NSR, NSST changes   EKG 5/16/16 - NSR, NSST changes, PVC's   EKG 5/8/19 - NSR, non-specific T wave abn   EKG 7/2/20 - NSR, non-specific T wave abn   EKG 8/16/21 - NSR, lateral TWI (similar to prior EKG)          ASSESSMENT AND PLAN:         Assessment and Plan:  1) CAD  - NSTEMI 3/21/15; CABG 3/24/15; LVEF 55%  - Mr. Santana is doing well s/p CABG - no anginal complaints   - Continue BB, statin, ACE-I, and ASA  - I encouraged he exercise and eat healthy   - recheck an echo next visit       2) HTN    - on multiple meds for his BP  - BP looks OK - continue meds       3) Dyslipidemia    - continue Crestor   - Lipids checked per PCP       4) Diabetes Mellitus  - working with PCP       5) See me in 12 months.   Patient expressed understanding of the plan - questions were answered.      He works maintenance for [employer]. Lives with wife. Has 2 young grand kids. Sharon Lui MD, [address] [facility]  [address] [facility], [address] [place] [address],[address]                [address] [facility]  [address] [facility], [address] [place] [address], [address] [place] [address]  Ph: [phone]                                                              [phone]

## 2021-08-17 NOTE — ADDENDUM NOTE
Addended by: Bryan Velazquez on: 8/17/2021 10:58 AM     Modules accepted: Orders NP Note discussed with Primary Attending.      Patient is a 89y old  Female who presents with a chief complaint of Shortness of breath (06 May 2021 19:35)      INTERVAL HPI/OVERNIGHT EVENTS: no new complaints    MEDICATIONS  (STANDING):  aMIOdarone    Tablet 200 milliGRAM(s) Oral daily  apixaban 2.5 milliGRAM(s) Oral two times a day  chlorhexidine 2% Cloths 1 Application(s) Topical daily  epoetin maria d-epbx (RETACRIT) Injectable 8000 Unit(s) IV Push <User Schedule>  insulin glargine Injectable (LANTUS) 5 Unit(s) SubCutaneous at bedtime  insulin lispro (ADMELOG) corrective regimen sliding scale   SubCutaneous Before meals and at bedtime  midodrine 10 milliGRAM(s) Oral <User Schedule>  polyethylene glycol 3350 17 Gram(s) Oral daily  senna 2 Tablet(s) Oral at bedtime    MEDICATIONS  (PRN):  bisacodyl Suppository 10 milliGRAM(s) Rectal daily PRN Constipation      __________________________________________________  REVIEW OF SYSTEMS:    CONSTITUTIONAL: No fever,   EYES: no acute visual disturbances  NECK: No pain or stiffness  RESPIRATORY: No cough; shortness of breath  CARDIOVASCULAR: No chest pain, no palpitations  GASTROINTESTINAL: No pain. No nausea or vomiting; No diarrhea   NEUROLOGICAL: No headache or numbness, no tremors  MUSCULOSKELETAL: No joint pain, no muscle pain  GENITOURINARY: ESRD on HD,   PSYCHIATRY: no depression , no anxiety  ALL OTHER  ROS negative        Vital Signs Last 24 Hrs  T(C): 36.8 (07 May 2021 05:10), Max: 36.8 (06 May 2021 17:05)  T(F): 98.2 (07 May 2021 05:10), Max: 98.2 (06 May 2021 17:05)  HR: 69 (07 May 2021 11:54) (66 - 106)  BP: 139/60 (07 May 2021 11:54) (127/59 - 182/65)  BP(mean): 80 (06 May 2021 17:00) (77 - 98)  RR: 18 (07 May 2021 05:10) (18 - 29)  SpO2: 99% (07 May 2021 11:54) (95% - 100%)    ________________________________________________  PHYSICAL EXAM:  GENERAL: NAD  HEENT: Normocephalic;  conjunctivae and sclerae clear; moist mucous membranes;   NECK : supple  CHEST/LUNG: Clear to auscultation bilaterally with good air entry   HEART: Afib,  irregular; no murmurs, gallops or rubs  ABDOMEN: Soft, Nontender, Nondistended; Bowel sounds present  EXTREMITIES: no cyanosis; no edema; no calf tenderness  SKIN: warm and dry; no rash  NERVOUS SYSTEM:  Awake and alert; Oriented  to place, person and time ; no new deficits    _________________________________________________  LABS:                        8.6    7.93  )-----------( 220      ( 07 May 2021 08:20 )             26.4     05-07    137  |  101  |  23<H>  ----------------------------<  197<H>  3.8   |  27  |  3.75<H>    Ca    8.4      07 May 2021 08:20  Phos  3.0     05-  Mg     2.3     -    TPro  6.1  /  Alb  2.6<L>  /  TBili  0.4  /  DBili  x   /  AST  13  /  ALT  15  /  AlkPhos  157<H>  05-07      Urinalysis Basic - ( 06 May 2021 04:15 )    Color: Yellow / Appearance: very cloudy / S.015 / pH: x  Gluc: x / Ketone: Negative  / Bili: Negative / Urobili: Negative   Blood: x / Protein: 500 mg/dL / Nitrite: Negative   Leuk Esterase: Moderate / RBC: >50 /HPF / WBC >50 /HPF   Sq Epi: x / Non Sq Epi: Occasional /HPF / Bacteria: Moderate /HPF      CAPILLARY BLOOD GLUCOSE      POCT Blood Glucose.: 372 mg/dL (07 May 2021 11:14)  POCT Blood Glucose.: 199 mg/dL (07 May 2021 07:25)  POCT Blood Glucose.: 214 mg/dL (06 May 2021 21:24)  POCT Blood Glucose.: 356 mg/dL (06 May 2021 17:21)        RADIOLOGY & ADDITIONAL TESTS:      EXAM:  XR CHEST PORTABLE URGENT 1V                            PROCEDURE DATE:  2021          INTERPRETATION:  INDICATION: Chest Pain    PRIORS: None    VIEWS: Portable AP radiography of the chest performed. Evaluation limited secondary to shallow inspiration.    FINDINGS: Heart size appears within normal limits. A right-sided dual-lumen central venous catheter is identified, the distal tips overlying the expected region of the SVC/RA confluence. Note is made of an indwelling Micra pacemaker. No hilar or superior mediastinal abnormalities are identified. Interstitial prominence bilaterally may reflect shallow inspiration. Mild interstitial edema cannot be excluded. No pleural effusion or pneumothorax is demonstrated. No mediastinal shift is noted. Degenerative changes thoracic spine.    IMPRESSION: Interstitial prominence may reflect shallow inspiration. Mild interstitial edema cannot be excluded.              NYASIA VAIL MD; Attending Radiologist  This document has been electronically signed. May  4 2021  9:12AM    Imaging  Reviewed:  YES/NO    Consultant(s) Notes Reviewed:   YES/ No      Plan of care was discussed with patient and /or primary care giver; all questions and concerns were addressed

## 2022-01-17 RX ORDER — CARVEDILOL 25 MG/1
37.5 TABLET ORAL 2 TIMES DAILY
Qty: 270 TABLET | Refills: 3 | OUTPATIENT
Start: 2022-01-17

## 2022-01-17 NOTE — TELEPHONE ENCOUNTER
Refill per VO of Dr. Lyudmila Cronin  Last appt: 8/16/2021  No future appointments. Requested Prescriptions     Refused Prescriptions Disp Refills    carvediloL (COREG) 25 mg tablet 270 Tablet 3     Sig: Take 1.5 Tablets by mouth two (2) times a day.      Refused By: Malik Bowers     Reason for Refusal: Patient has requested refill too soon

## 2022-02-18 LAB — LDL-C, EXTERNAL: 33

## 2022-02-21 LAB — HBA1C MFR BLD HPLC: 8.8 %

## 2022-03-18 PROBLEM — L08.9 DIABETIC FOOT INFECTION (HCC): Status: ACTIVE | Noted: 2019-07-04

## 2022-03-18 PROBLEM — E11.42 DM TYPE 2 WITH DIABETIC PERIPHERAL NEUROPATHY (HCC): Status: ACTIVE | Noted: 2019-07-04

## 2022-03-18 PROBLEM — E11.628 DIABETIC FOOT INFECTION (HCC): Status: ACTIVE | Noted: 2019-07-04

## 2022-03-19 PROBLEM — E78.5 DYSLIPIDEMIA: Status: ACTIVE | Noted: 2020-07-02

## 2022-03-19 PROBLEM — M86.9 OSTEOMYELITIS OF THIRD TOE OF LEFT FOOT (HCC): Status: ACTIVE | Noted: 2020-11-20

## 2022-03-19 PROBLEM — R79.89 ELEVATED SERUM CREATININE: Status: ACTIVE | Noted: 2019-07-04

## 2022-05-25 ENCOUNTER — OFFICE VISIT (OUTPATIENT)
Dept: CARDIOLOGY CLINIC | Age: 72
End: 2022-05-25
Payer: COMMERCIAL

## 2022-05-25 VITALS
WEIGHT: 256 LBS | DIASTOLIC BLOOD PRESSURE: 60 MMHG | HEIGHT: 65 IN | HEART RATE: 56 BPM | OXYGEN SATURATION: 96 % | SYSTOLIC BLOOD PRESSURE: 130 MMHG | BODY MASS INDEX: 42.65 KG/M2

## 2022-05-25 DIAGNOSIS — I25.118 CORONARY ARTERY DISEASE OF NATIVE ARTERY OF NATIVE HEART WITH STABLE ANGINA PECTORIS (HCC): ICD-10-CM

## 2022-05-25 DIAGNOSIS — R53.83 FATIGUE, UNSPECIFIED TYPE: ICD-10-CM

## 2022-05-25 DIAGNOSIS — R06.02 SOB (SHORTNESS OF BREATH): Primary | ICD-10-CM

## 2022-05-25 PROCEDURE — 3017F COLORECTAL CA SCREEN DOC REV: CPT | Performed by: SPECIALIST

## 2022-05-25 PROCEDURE — G8754 DIAS BP LESS 90: HCPCS | Performed by: SPECIALIST

## 2022-05-25 PROCEDURE — G8417 CALC BMI ABV UP PARAM F/U: HCPCS | Performed by: SPECIALIST

## 2022-05-25 PROCEDURE — G8427 DOCREV CUR MEDS BY ELIG CLIN: HCPCS | Performed by: SPECIALIST

## 2022-05-25 PROCEDURE — 99214 OFFICE O/P EST MOD 30 MIN: CPT | Performed by: SPECIALIST

## 2022-05-25 PROCEDURE — 1123F ACP DISCUSS/DSCN MKR DOCD: CPT | Performed by: SPECIALIST

## 2022-05-25 PROCEDURE — G8536 NO DOC ELDER MAL SCRN: HCPCS | Performed by: SPECIALIST

## 2022-05-25 PROCEDURE — 1101F PT FALLS ASSESS-DOCD LE1/YR: CPT | Performed by: SPECIALIST

## 2022-05-25 PROCEDURE — G8432 DEP SCR NOT DOC, RNG: HCPCS | Performed by: SPECIALIST

## 2022-05-25 PROCEDURE — G8752 SYS BP LESS 140: HCPCS | Performed by: SPECIALIST

## 2022-05-25 NOTE — PROGRESS NOTES
Griselda Mowers, MD. [facility] [place]              Patient: Nimo Santana  : 1950      Today's Date: 2022          HISTORY OF PRESENT ILLNESS:     History of Present Illness:  Has been having some SOB / ROQUE - PCP did CXR and that looked OK. No CP. He hs ROQUE going up and down stops or a long distance. PAST MEDICAL HISTORY:     Past Medical History:   Diagnosis Date    CAD (coronary artery disease)     NSTEMI 3/21/15; CABG 3/24/15;  LVEF 55%    Diabetes ([facility] [address]     Dyslipidemia     FH ischemic heart disease     HTN (hypertension)     Hypertension     NSTEMI (non-ST elevated myocardial infarction) ([facility] [address]     NSTEMI 3/21/15    Obesity     MING (obstructive sleep apnea)     surgery in past    MING (obstructive sleep apnea)     on CPAP    Paronychia of great toe of right foot     S/P CABG x 4     CABG 3/24/15 - LIMA TO LAD, SEQUENTIAL VEIN GRAFT TO OM1, OM2; SVG TO DISTAL RCA         Past Surgical History:   Procedure Laterality Date    HX APPENDECTOMY      HX HEENT      eyes           MEDICATIONS:     Current Outpatient Medications   Medication Sig Dispense Refill    sitagliptin phosphate (JANUVIA PO) Take 100 mg by mouth daily.  dilTIAZem ER (Cartia XT) 240 mg capsule Take 1 Capsule by mouth daily. TAKE 1 CAPSULE BY MOUTH ONCE DAILY 90 Capsule 3    eplerenone (INSPRA) 25 mg tablet Take 1 Tablet by mouth daily. 90 Tablet 3    carvediloL (COREG) 25 mg tablet Take 1.5 Tablets by mouth two (2) times a day. 270 Tablet 3    rosuvastatin (CRESTOR) 20 mg tablet Take 1 Tablet by mouth nightly. 90 Tablet 3    lisinopril-hydroCHLOROthiazide (PRINZIDE, ZESTORETIC) 20-12.5 mg per tablet Take 2 Tablets by mouth daily. 180 Tablet 3    insulin glargine U-300 conc (TOUJEO MAX U-300 SOLOSTAR) 300 unit/mL (3 mL) inpn 220 Units by SubCUTAneous route nightly.  acetaminophen (TYLENOL) 500 mg tablet Take 1,000 mg by mouth every six (6) hours as needed for Pain.       omeprazole (PRILOSEC) 20 mg capsule Take 20 mg by mouth daily.  Azelastine (ASTEPRO) 0.15 % (205.5 mcg) nasal spray 1 Spray daily.  montelukast (SINGULAIR) 10 mg tablet Take 10 mg by mouth every evening.  dapagliflozin (FARXIGA) 10 mg tab Take 10 mg by mouth daily.  cetirizine (ZYRTEC) 10 mg tablet Take 10 mg by mouth daily as needed for Allergies.  aspirin delayed-release 81 mg tablet Take 81 mg by mouth daily. Allergies   Allergen Reactions    Amoxicillin Hives    Metformin Diarrhea    Ozempic [Semaglutide] Other (comments)     GI problem            SOCIAL HISTORY:     Social History     Tobacco Use    Smoking status: Former Smoker     Types: Pipe     Quit date:      Years since quittin.4    Smokeless tobacco: Never Used   Substance Use Topics    Alcohol use: Yes     Alcohol/week: 0.0 standard drinks     Comment: rarely    Drug use: No         FAMILY HISTORY:     Family History   Problem Relation Age of Onset    Coronary Art Dis Mother     Coronary Art Dis Father            REVIEW OF SYMPTOMS:     Review of Symptoms:  Constitutional: Negative for fever, chills  HEENT: Negative for nosebleeds, tinnitus, and vision changes. Respiratory: Negative for cough, wheezing  Cardiovascular: Negative for orthopnea, claudication, syncope, and PND. Gastrointestinal: Negative for abdominal pain, diarrhea, melena. Genitourinary: Negative for dysuria  Musculoskeletal: Negative for myalgias. Skin: Negative for rash  Heme: No problems bleeding. Neurological: Negative for speech change and focal weakness. PHYSICAL EXAM:     Physical Exam:  Visit Vitals  /60 (BP 1 Location: Left upper arm, BP Patient Position: Sitting)   Pulse (!) 56   Ht 5' 5\" (1.651 m)   Wt 256 lb (116.1 kg)   SpO2 96%   BMI 42.60 kg/m²     Patient appears generally well, mood and affect are appropriate and pleasant. HEENT:  Hearing intact, non-icteric, normocephalic, atraumatic.    Neck Exam: Supple, Hard to assess JVD  Lung Exam: Clear to auscultation, even breath sounds. Cardiac Exam: Regular rate and rhythm with no murmur or rub  Abdomen: Soft, non-tender, normal bowel sounds. Obese   Extremities: Moves all ext well. Trace lower extremity edema. Psych: Appropriate affect  Neuro - Grossly intact        LABS / OTHER STUDIES:     Lab Results   Component Value Date/Time    Sodium 139 07/08/2019 02:04 AM    Potassium 4.2 07/08/2019 02:04 AM    Chloride 106 07/08/2019 02:04 AM    CO2 26 07/08/2019 02:04 AM    Anion gap 7 07/08/2019 02:04 AM    Glucose 130 (H) 07/08/2019 02:04 AM    BUN 16 07/08/2019 02:04 AM    Creatinine 0.97 07/08/2019 02:04 AM    BUN/Creatinine ratio 16 07/08/2019 02:04 AM    GFR est AA >60 07/08/2019 02:04 AM    GFR est non-AA >60 07/08/2019 02:04 AM    Calcium 8.6 07/08/2019 02:04 AM    Bilirubin, total 0.6 07/08/2019 02:04 AM    Alk.  phosphatase 65 07/08/2019 02:04 AM    Protein, total 6.9 07/08/2019 02:04 AM    Albumin 3.0 (L) 07/08/2019 02:04 AM    Globulin 3.9 07/08/2019 02:04 AM    A-G Ratio 0.8 (L) 07/08/2019 02:04 AM    ALT (SGPT) 32 07/08/2019 02:04 AM    AST (SGOT) 26 07/08/2019 02:04 AM     Lab Results   Component Value Date/Time    WBC 9.0 07/08/2019 02:04 AM    Hemoglobin (POC) 15.6 03/21/2015 09:39 AM    HGB 13.2 07/08/2019 02:04 AM    Hematocrit (POC) 46 03/21/2015 09:39 AM    HCT 40.5 07/08/2019 02:04 AM    PLATELET 792 05/12/6043 02:04 AM    MCV 89.0 07/08/2019 02:04 AM       Lab Results   Component Value Date/Time    Cholesterol, total 121 03/22/2015 02:45 AM    HDL Cholesterol 26 03/22/2015 02:45 AM    LDL, calculated 41 03/22/2015 02:45 AM    VLDL, calculated 54 03/22/2015 02:45 AM    Triglyceride 270 (H) 03/22/2015 02:45 AM    CHOL/HDL Ratio 4.7 03/22/2015 02:45 AM       Lab Results   Component Value Date/Time    TSH 1.74 03/23/2015 10:02 PM            CARDIAC DIAGNOSTICS:      Cardiac Evaluation Includes:        ECHO: 3/21/2015: EF 55-60%, no WMA, mild LVH, MAC, no MR    CATH: 3/23/2015: Obstructive 3VD:dLM:80%, mLAD:80%, dLAD:70%, D1:90% mLCX: 90%, mRCA:50%, PDA: 80%, EF 55%, Focal mid inferior AK. Intra-op WILEY 3/24/15 - postop LVEF 55%, mild MR  CABG 3/24/15 - LIMA TO LAD, SEQUENTIAL VEIN GRAFT TO OM1, OM2; SVG TO DISTAL RCA      EKG 3/21/15 - NSR, diffuse ST depression   EKG 5/16/16 - NSR, NSST changes   EKG 5/16/16 - NSR, NSST changes, PVC's   EKG 5/8/19 - NSR, non-specific T wave abn   EKG 7/2/20 - NSR, non-specific T wave abn   EKG 8/16/21 - NSR, lateral TWI (similar to prior EKG)          ASSESSMENT AND PLAN:         Assessment and Plan:  1) CAD  - NSTEMI 3/21/15; CABG 3/24/15; LVEF 55%  - On 5/25/22 - he notes new class 2 ROQUE (no orthopnea or CP) ---> Will get recent labs and CXR from PCP. He has gained some weight lately (10 lbs). Will check an echo and 2 day exercise cardiolite when possible. Will also check proBNP (and basic labs). - Continue BB, statin, ACE-I, and ASA  - I encouraged he exercise and eat healthy     2) HTN    - on multiple meds for his BP  - BP looks OK - continue meds       3) Dyslipidemia    - continue Crestor   - Lipids checked per PCP       4) Diabetes Mellitus  - working with PCP       5) See me in 6 weeks. He works maintenance for [employer]. Lives with wife. Has 2 young grand kids. Tosin Gallardo MD, [facility]  [address] [facility], [address] [address]                [id number] [place] [address] [address], [facility], [address] [place] [address]  Ph: [phone]                                                             [phone]        ADDENDUM   6/21/2022    Lexiscan Cardiolite 6/16/22 - Attempted to walk paitent on treadmill reaching a max HR of 97, mets 5.7 stopping secondary to hip pain. Protocol switched to [facility]. No CP.    Stress ECG: Ischemic ST changes with 1 mm downsloping ST depression in the inferior leads after Lexiscan injection. Perfusion Comments: LV perfusion is abnormal.  There is a moderate sized, moderate grade, partially reversible defect with minimal reversibility. Imaging improves just minimally with prone imaging. SSS = 11, SRS = 6, SDS = 4. Findings consistent with a small to moderate sized inferior wall infarct with mild reversibility. A small degree of diaphragmatic attenuation also likely present. LVEF 63%. Echo 6/17/22 - TDS. LVEF 58%. Grade 2 diastology. Mild LAE. Consider a cath for symptoms and abnormal stress test.   Will call       ADDENDUM   6/23/2022  I called and spoke to patient. He has ROQUE with light to moderate exertion despite a good cardiac regimen. Reviewed options and he agrees to proceed with a cardiac cath (reviewed risks of MI, CVA, TIMUR, death, etc). Will have nurse set up cath.

## 2022-05-25 NOTE — PROGRESS NOTES
Chief Complaint   Patient presents with    Coronary Artery Disease    Cholesterol Problem    Hypertension    Follow-up     Visit Vitals  /60 (BP 1 Location: Left upper arm, BP Patient Position: Sitting)   Pulse (!) 56   Ht 5' 5\" (1.651 m)   Wt 256 lb (116.1 kg)   SpO2 96%   BMI 42.60 kg/m²     Chest pain denied   SOB Yes   Palpitations denied   Swelling in hands/feet denied   Dizziness sometime when stand up after sitting for long time.    Recent hospital stays denied   Refills denied

## 2022-05-25 NOTE — PROGRESS NOTES
Orders for Echo and 2 day exercise cardilite when possible. See me in 6 weeks    per Dr. Josie Brady VO.   Dx: cad, mcnair

## 2022-06-14 LAB
ALBUMIN SERPL-MCNC: 4 G/DL (ref 3.8–4.8)
ALBUMIN/GLOB SERPL: 1.7 {RATIO} (ref 1.2–2.2)
ALP SERPL-CCNC: 82 IU/L (ref 44–121)
ALT SERPL-CCNC: 37 IU/L (ref 0–44)
AST SERPL-CCNC: 33 IU/L (ref 0–40)
BILIRUB SERPL-MCNC: 0.5 MG/DL (ref 0–1.2)
BUN SERPL-MCNC: 26 MG/DL (ref 8–27)
BUN/CREAT SERPL: 18 (ref 10–24)
CALCIUM SERPL-MCNC: 9.2 MG/DL (ref 8.6–10.2)
CHLORIDE SERPL-SCNC: 103 MMOL/L (ref 96–106)
CHOLEST SERPL-MCNC: 96 MG/DL (ref 100–199)
CO2 SERPL-SCNC: 23 MMOL/L (ref 20–29)
CREAT SERPL-MCNC: 1.48 MG/DL (ref 0.76–1.27)
EGFR: 51 ML/MIN/1.73
ERYTHROCYTE [DISTWIDTH] IN BLOOD BY AUTOMATED COUNT: 14.1 % (ref 11.6–15.4)
GLOBULIN SER CALC-MCNC: 2.4 G/DL (ref 1.5–4.5)
GLUCOSE SERPL-MCNC: 153 MG/DL (ref 65–99)
HCT VFR BLD AUTO: 43.7 % (ref 37.5–51)
HDLC SERPL-MCNC: 24 MG/DL
HGB BLD-MCNC: 14.3 G/DL (ref 13–17.7)
LDLC SERPL CALC-MCNC: 36 MG/DL (ref 0–99)
MCH RBC QN AUTO: 28 PG (ref 26.6–33)
MCHC RBC AUTO-ENTMCNC: 32.7 G/DL (ref 31.5–35.7)
MCV RBC AUTO: 86 FL (ref 79–97)
NT-PROBNP SERPL-MCNC: 156 PG/ML (ref 0–376)
PLATELET # BLD AUTO: 153 X10E3/UL (ref 150–450)
POTASSIUM SERPL-SCNC: 4.8 MMOL/L (ref 3.5–5.2)
PROT SERPL-MCNC: 6.4 G/DL (ref 6–8.5)
RBC # BLD AUTO: 5.1 X10E6/UL (ref 4.14–5.8)
SODIUM SERPL-SCNC: 137 MMOL/L (ref 134–144)
TRIGL SERPL-MCNC: 230 MG/DL (ref 0–149)
TSH SERPL DL<=0.005 MIU/L-ACNC: 2.38 UIU/ML (ref 0.45–4.5)
VLDLC SERPL CALC-MCNC: 36 MG/DL (ref 5–40)
WBC # BLD AUTO: 7.4 X10E3/UL (ref 3.4–10.8)

## 2022-06-15 ENCOUNTER — TELEPHONE (OUTPATIENT)
Dept: CARDIOLOGY CLINIC | Age: 72
End: 2022-06-15

## 2022-06-15 NOTE — TELEPHONE ENCOUNTER
ID verified per protocol. Confirmed appointment(s) for 6/16/22. Arrive @ [facility] @ [id number]. Patient instructed to be NPO x 4 hrs prior, no caffeine (not even decaf coffee,tea,bubba) x 24 hrs prior, wear comfortable clothes/good walking shoes. Patient also informed the test takes 2 hrs.  Patient verbalized understanding and agrees with prep/test.  Alejo Minor RN

## 2022-06-15 NOTE — TELEPHONE ENCOUNTER
Can you please call patient - labs OK, but he looks a little dehydrated. Please ask him to stay hydrated and cut lisinopril-HCTZ dose in half. Thanks! Spk with pt he verbalized understanding of the above msg.

## 2022-06-15 NOTE — PROGRESS NOTES
Can you please call patient - labs OK, but he looks a little dehydrated. Please ask him to stay hydrated and cut lisinopril-HCTZ dose in half. Thanks!

## 2022-06-16 ENCOUNTER — ANCILLARY PROCEDURE (OUTPATIENT)
Dept: CARDIOLOGY CLINIC | Age: 72
End: 2022-06-16
Payer: COMMERCIAL

## 2022-06-16 VITALS — WEIGHT: 256 LBS | HEIGHT: 65 IN | BODY MASS INDEX: 42.65 KG/M2

## 2022-06-16 DIAGNOSIS — R06.02 SOB (SHORTNESS OF BREATH): ICD-10-CM

## 2022-06-16 DIAGNOSIS — I25.118 CORONARY ARTERY DISEASE OF NATIVE ARTERY OF NATIVE HEART WITH STABLE ANGINA PECTORIS (HCC): ICD-10-CM

## 2022-06-16 DIAGNOSIS — R53.83 FATIGUE, UNSPECIFIED TYPE: ICD-10-CM

## 2022-06-16 PROCEDURE — 93015 CV STRESS TEST SUPVJ I&R: CPT | Performed by: SPECIALIST

## 2022-06-16 PROCEDURE — A9500 TC99M SESTAMIBI: HCPCS | Performed by: SPECIALIST

## 2022-06-16 PROCEDURE — 78452 HT MUSCLE IMAGE SPECT MULT: CPT | Performed by: SPECIALIST

## 2022-06-16 RX ORDER — TETRAKIS(2-METHOXYISOBUTYLISOCYANIDE)COPPER(I) TETRAFLUOROBORATE 1 MG/ML
24.8 INJECTION, POWDER, LYOPHILIZED, FOR SOLUTION INTRAVENOUS ONCE
Status: COMPLETED | OUTPATIENT
Start: 2022-06-16 | End: 2022-06-16

## 2022-06-16 RX ADMIN — TETRAKIS(2-METHOXYISOBUTYLISOCYANIDE)COPPER(I) TETRAFLUOROBORATE 24.8 MILLICURIE: 1 INJECTION, POWDER, LYOPHILIZED, FOR SOLUTION INTRAVENOUS at 08:30

## 2022-06-17 ENCOUNTER — ANCILLARY PROCEDURE (OUTPATIENT)
Dept: CARDIOLOGY CLINIC | Age: 72
End: 2022-06-17
Payer: COMMERCIAL

## 2022-06-17 ENCOUNTER — APPOINTMENT (OUTPATIENT)
Dept: CARDIOLOGY CLINIC | Age: 72
End: 2022-06-17

## 2022-06-17 VITALS
DIASTOLIC BLOOD PRESSURE: 70 MMHG | BODY MASS INDEX: 42.65 KG/M2 | SYSTOLIC BLOOD PRESSURE: 140 MMHG | WEIGHT: 256 LBS | HEIGHT: 65 IN

## 2022-06-17 DIAGNOSIS — R06.02 SOB (SHORTNESS OF BREATH): ICD-10-CM

## 2022-06-17 DIAGNOSIS — R53.83 FATIGUE, UNSPECIFIED TYPE: ICD-10-CM

## 2022-06-17 DIAGNOSIS — I25.118 CORONARY ARTERY DISEASE OF NATIVE ARTERY OF NATIVE HEART WITH STABLE ANGINA PECTORIS (HCC): ICD-10-CM

## 2022-06-17 PROCEDURE — 93306 TTE W/DOPPLER COMPLETE: CPT | Performed by: SPECIALIST

## 2022-06-17 RX ORDER — TETRAKIS(2-METHOXYISOBUTYLISOCYANIDE)COPPER(I) TETRAFLUOROBORATE 1 MG/ML
24.7 INJECTION, POWDER, LYOPHILIZED, FOR SOLUTION INTRAVENOUS ONCE
Status: COMPLETED | OUTPATIENT
Start: 2022-06-17 | End: 2022-06-17

## 2022-06-17 RX ADMIN — TETRAKIS(2-METHOXYISOBUTYLISOCYANIDE)COPPER(I) TETRAFLUOROBORATE 24.7 MILLICURIE: 1 INJECTION, POWDER, LYOPHILIZED, FOR SOLUTION INTRAVENOUS at 13:20

## 2022-06-20 LAB
ECHO AO ROOT DIAM: 3.5 CM
ECHO AO ROOT INDEX: 1.59 CM/M2
ECHO AV MEAN GRADIENT: 4 MMHG
ECHO AV MEAN VELOCITY: 1 M/S
ECHO AV PEAK GRADIENT: 8 MMHG
ECHO AV PEAK VELOCITY: 1.4 M/S
ECHO AV VELOCITY RATIO: 0.93
ECHO AV VTI: 27.7 CM
ECHO EST RA PRESSURE: 3 MMHG
ECHO LA DIAMETER INDEX: 2.14 CM/M2
ECHO LA DIAMETER: 4.7 CM
ECHO LA TO AORTIC ROOT RATIO: 1.34
ECHO LA VOL 2C: 84 ML (ref 18–58)
ECHO LA VOL 4C: 88 ML (ref 18–58)
ECHO LA VOLUME AREA LENGTH: 89 ML
ECHO LA VOLUME INDEX A2C: 38 ML/M2 (ref 16–34)
ECHO LA VOLUME INDEX A4C: 40 ML/M2 (ref 16–34)
ECHO LA VOLUME INDEX AREA LENGTH: 40 ML/M2 (ref 16–34)
ECHO LV E' LATERAL VELOCITY: 8 CM/S
ECHO LV E' SEPTAL VELOCITY: 4 CM/S
ECHO LV EDV A2C: 121 ML
ECHO LV EDV A4C: 148 ML
ECHO LV EDV BP: 135 ML (ref 67–155)
ECHO LV EDV INDEX A4C: 67 ML/M2
ECHO LV EDV INDEX BP: 61 ML/M2
ECHO LV EDV NDEX A2C: 55 ML/M2
ECHO LV EJECTION FRACTION A2C: 57 %
ECHO LV EJECTION FRACTION A4C: 59 %
ECHO LV EJECTION FRACTION BIPLANE: 58 % (ref 55–100)
ECHO LV ESV A2C: 52 ML
ECHO LV ESV A4C: 61 ML
ECHO LV ESV BP: 57 ML (ref 22–58)
ECHO LV ESV INDEX A2C: 24 ML/M2
ECHO LV ESV INDEX A4C: 28 ML/M2
ECHO LV ESV INDEX BP: 26 ML/M2
ECHO LVOT AV VTI INDEX: 0.95
ECHO LVOT MEAN GRADIENT: 4 MMHG
ECHO LVOT PEAK GRADIENT: 7 MMHG
ECHO LVOT PEAK VELOCITY: 1.3 M/S
ECHO LVOT VTI: 26.2 CM
ECHO MV A VELOCITY: 0.72 M/S
ECHO MV AREA PHT: 4.7 CM2
ECHO MV E DECELERATION TIME (DT): 161 MS
ECHO MV E VELOCITY: 1 M/S
ECHO MV E/A RATIO: 1.39
ECHO MV E/E' LATERAL: 12.5
ECHO MV E/E' RATIO (AVERAGED): 18.75
ECHO MV E/E' SEPTAL: 25
ECHO MV LVOT VTI INDEX: 1
ECHO MV MAX VELOCITY: 1 M/S
ECHO MV MEAN GRADIENT: 1 MMHG
ECHO MV MEAN VELOCITY: 0.6 M/S
ECHO MV PEAK GRADIENT: 4 MMHG
ECHO MV PRESSURE HALF TIME (PHT): 46.7 MS
ECHO MV VTI: 26.3 CM
ECHO RV INTERNAL DIMENSION: 4 CM
ECHO RV TAPSE: 1.6 CM (ref 1.7–?)
NUC STRESS EJECTION FRACTION: 63 %
STRESS BASELINE DIAS BP: 70 MMHG
STRESS BASELINE HR: 74 BPM
STRESS BASELINE SYS BP: 140 MMHG
STRESS O2 SAT PEAK: 98 %
STRESS O2 SAT REST: 95 %
STRESS PEAK DIAS BP: 60 MMHG
STRESS PEAK SYS BP: 112 MMHG
STRESS PERCENT HR ACHIEVED: 65 %
STRESS POST PEAK HR: 97 BPM
STRESS RATE PRESSURE PRODUCT: NORMAL BPM*MMHG
STRESS TARGET HR: 150 BPM

## 2022-06-23 ENCOUNTER — TELEPHONE (OUTPATIENT)
Dept: CARDIOLOGY CLINIC | Age: 72
End: 2022-06-23

## 2022-06-23 NOTE — TELEPHONE ENCOUNTER
Per Dr. Sandra English: \"Ludin Salcedo - can you please set up patient for a cardiac cath for ROQUE (despite meds) and abnormal stress test.     JT - Note attached.  His Cr is up a little at 1.48 --> may need some hydration pre-cath. \"    Labs drawn 6/13/22. Pre-cath fluid instruction: TBD    Meds to hold: Brianuvia day of. Toujeo is long acting insulin. Do not hold ASA. Dx: ROQUE (R06.00), abn nuc stress (R94.39)    Carola Shane 6/16/22 \" Perfusion Comments: LV perfusion is abnormal.  There is a moderate sized, moderate grade, partially reversible defect with minimal reversibility. \"    \"ADDENDUM   6/23/2022  I called and spoke to patient. He has ROQUE with light to moderate exertion despite a good cardiac regimen. Reviewed options and he agrees to proceed with a cardiac cath (reviewed risks of MI, CVA, TIMUR, death, etc). Will have nurse set up cath.  \"

## 2022-06-27 DIAGNOSIS — R94.39 ABNORMAL STRESS TEST: Primary | ICD-10-CM

## 2022-06-27 RX ORDER — SODIUM CHLORIDE 0.9 % (FLUSH) 0.9 %
5-40 SYRINGE (ML) INJECTION AS NEEDED
Status: CANCELLED | OUTPATIENT
Start: 2022-06-28

## 2022-06-27 RX ORDER — SODIUM CHLORIDE 9 MG/ML
100 INJECTION, SOLUTION INTRAVENOUS CONTINUOUS
Status: CANCELLED | OUTPATIENT
Start: 2022-06-28

## 2022-06-27 RX ORDER — SODIUM CHLORIDE 0.9 % (FLUSH) 0.9 %
5-40 SYRINGE (ML) INJECTION EVERY 8 HOURS
Status: CANCELLED | OUTPATIENT
Start: 2022-06-28

## 2022-06-28 ENCOUNTER — HOSPITAL ENCOUNTER (OUTPATIENT)
Age: 72
Setting detail: OUTPATIENT SURGERY
Discharge: HOME | End: 2022-06-28
Attending: STUDENT IN AN ORGANIZED HEALTH CARE EDUCATION/TRAINING PROGRAM | Admitting: STUDENT IN AN ORGANIZED HEALTH CARE EDUCATION/TRAINING PROGRAM
Payer: COMMERCIAL

## 2022-06-28 VITALS
WEIGHT: 253.3 LBS | HEIGHT: 65 IN | SYSTOLIC BLOOD PRESSURE: 115 MMHG | HEART RATE: 47 BPM | RESPIRATION RATE: 18 BRPM | OXYGEN SATURATION: 94 % | BODY MASS INDEX: 42.2 KG/M2 | DIASTOLIC BLOOD PRESSURE: 53 MMHG | TEMPERATURE: 97 F

## 2022-06-28 DIAGNOSIS — R94.39 ABNORMAL STRESS TEST: ICD-10-CM

## 2022-06-28 LAB
GLUCOSE BLD STRIP.AUTO-MCNC: 85 MG/DL (ref 65–117)
GLUCOSE BLD STRIP.AUTO-MCNC: 86 MG/DL (ref 65–117)
GLUCOSE BLD STRIP.AUTO-MCNC: 87 MG/DL (ref 65–117)
SERVICE CMNT-IMP: NORMAL

## 2022-06-28 PROCEDURE — 77030008543 HC TBNG MON PRSS MRTM -A: Performed by: STUDENT IN AN ORGANIZED HEALTH CARE EDUCATION/TRAINING PROGRAM

## 2022-06-28 PROCEDURE — 77030040934 HC CATH DIAG DXTERITY MEDT -A: Performed by: STUDENT IN AN ORGANIZED HEALTH CARE EDUCATION/TRAINING PROGRAM

## 2022-06-28 PROCEDURE — 99153 MOD SED SAME PHYS/QHP EA: CPT | Performed by: STUDENT IN AN ORGANIZED HEALTH CARE EDUCATION/TRAINING PROGRAM

## 2022-06-28 PROCEDURE — 77030029065 HC DRSG HEMO QCLOT ZMED -B

## 2022-06-28 PROCEDURE — 93459 L HRT ART/GRFT ANGIO: CPT | Performed by: STUDENT IN AN ORGANIZED HEALTH CARE EDUCATION/TRAINING PROGRAM

## 2022-06-28 PROCEDURE — 99152 MOD SED SAME PHYS/QHP 5/>YRS: CPT | Performed by: STUDENT IN AN ORGANIZED HEALTH CARE EDUCATION/TRAINING PROGRAM

## 2022-06-28 PROCEDURE — 82962 GLUCOSE BLOOD TEST: CPT

## 2022-06-28 PROCEDURE — 2709999900 HC NON-CHARGEABLE SUPPLY: Performed by: STUDENT IN AN ORGANIZED HEALTH CARE EDUCATION/TRAINING PROGRAM

## 2022-06-28 PROCEDURE — C1894 INTRO/SHEATH, NON-LASER: HCPCS | Performed by: STUDENT IN AN ORGANIZED HEALTH CARE EDUCATION/TRAINING PROGRAM

## 2022-06-28 PROCEDURE — 74011250636 HC RX REV CODE- 250/636: Performed by: STUDENT IN AN ORGANIZED HEALTH CARE EDUCATION/TRAINING PROGRAM

## 2022-06-28 PROCEDURE — 74011000250 HC RX REV CODE- 250: Performed by: STUDENT IN AN ORGANIZED HEALTH CARE EDUCATION/TRAINING PROGRAM

## 2022-06-28 PROCEDURE — 77030008591 HC TRAP FNGR HK CNMD -B: Performed by: STUDENT IN AN ORGANIZED HEALTH CARE EDUCATION/TRAINING PROGRAM

## 2022-06-28 PROCEDURE — 77030004532 HC CATH ANGI DX IMP BSC -A: Performed by: STUDENT IN AN ORGANIZED HEALTH CARE EDUCATION/TRAINING PROGRAM

## 2022-06-28 PROCEDURE — 74011000636 HC RX REV CODE- 636: Performed by: STUDENT IN AN ORGANIZED HEALTH CARE EDUCATION/TRAINING PROGRAM

## 2022-06-28 PROCEDURE — 77030019569 HC BND COMPR RAD TERU -B: Performed by: STUDENT IN AN ORGANIZED HEALTH CARE EDUCATION/TRAINING PROGRAM

## 2022-06-28 RX ORDER — SODIUM CHLORIDE 0.9 % (FLUSH) 0.9 %
5-40 SYRINGE (ML) INJECTION EVERY 8 HOURS
Status: DISCONTINUED | OUTPATIENT
Start: 2022-06-28 | End: 2022-06-28 | Stop reason: HOSPADM

## 2022-06-28 RX ORDER — SODIUM CHLORIDE 9 MG/ML
100 INJECTION, SOLUTION INTRAVENOUS CONTINUOUS
Status: DISCONTINUED | OUTPATIENT
Start: 2022-06-28 | End: 2022-06-28 | Stop reason: HOSPADM

## 2022-06-28 RX ORDER — SODIUM CHLORIDE 0.9 % (FLUSH) 0.9 %
5-40 SYRINGE (ML) INJECTION AS NEEDED
Status: DISCONTINUED | OUTPATIENT
Start: 2022-06-28 | End: 2022-06-28 | Stop reason: HOSPADM

## 2022-06-28 RX ORDER — HEPARIN SODIUM 1000 [USP'U]/ML
INJECTION, SOLUTION INTRAVENOUS; SUBCUTANEOUS AS NEEDED
Status: DISCONTINUED | OUTPATIENT
Start: 2022-06-28 | End: 2022-06-28 | Stop reason: HOSPADM

## 2022-06-28 RX ORDER — MIDAZOLAM HYDROCHLORIDE 1 MG/ML
INJECTION, SOLUTION INTRAMUSCULAR; INTRAVENOUS AS NEEDED
Status: DISCONTINUED | OUTPATIENT
Start: 2022-06-28 | End: 2022-06-28 | Stop reason: HOSPADM

## 2022-06-28 RX ORDER — FENTANYL CITRATE 50 UG/ML
INJECTION, SOLUTION INTRAMUSCULAR; INTRAVENOUS AS NEEDED
Status: DISCONTINUED | OUTPATIENT
Start: 2022-06-28 | End: 2022-06-28 | Stop reason: HOSPADM

## 2022-06-28 RX ORDER — LIDOCAINE HYDROCHLORIDE 10 MG/ML
INJECTION INFILTRATION; PERINEURAL AS NEEDED
Status: DISCONTINUED | OUTPATIENT
Start: 2022-06-28 | End: 2022-06-28 | Stop reason: HOSPADM

## 2022-06-28 RX ORDER — VERAPAMIL HYDROCHLORIDE 2.5 MG/ML
INJECTION, SOLUTION INTRAVENOUS AS NEEDED
Status: DISCONTINUED | OUTPATIENT
Start: 2022-06-28 | End: 2022-06-28 | Stop reason: HOSPADM

## 2022-06-28 RX ORDER — HEPARIN SODIUM 200 [USP'U]/100ML
INJECTION, SOLUTION INTRAVENOUS
Status: COMPLETED | OUTPATIENT
Start: 2022-06-28 | End: 2022-06-28

## 2022-06-28 RX ADMIN — SODIUM CHLORIDE 100 ML/HR: 9 INJECTION, SOLUTION INTRAVENOUS at 07:29

## 2022-06-28 NOTE — PROCEDURES
BRIEF PROCEDURE NOTE    Date of Procedure: 6/28/2022   Preoperative Diagnosis: mcnair, abnormal stress test  Postoperative Diagnosis: cad    Procedure: Left heart cath, coronary angiography and bypass angiography, moderate sedation  Interventional Cardiologist: Libby Estrada DO  Assistant: None  Anesthesia: local + IV moderate sedation   I administered moderate sedation throughout this procedure. An independent trained observer pushed medications at my direction, and monitored the patients level of consciousness and physiological status throughout. Estimated Blood Loss: Minimal    Access: left radial artery, 6F  Catheters:  Left coronary: JL 4, 5F  Right coronary: JR4, 5F  LIMA: IM 5F  SVG to rca: MP  SVG to OM: AL1      Findings:   L Main: large caliber, severe calcification, distal left main 99% stenosis  LAD: moderate caliber vessel with severe diffuse disease throughout vessel, fills via left main and LIMA, diagonal fills via left to left collaterals from OM  LCx: ostial occluded, OM1 and OM2 fill via side-side and end-side respectively, severe diffuse disease of native marginals  RCA: small caliber, severe calcification, distal RCA , faint filling of distal vessel from LIMA, native films in 2015 showed severe diffuse disease of small PDA and PL-branch    LVEDP:  17 mmhg      Specimens Removed: None    Implants: n/a    Closure Device: radial TR band    See full cath note. Complications: none      Findings:  1. Severe diffuse native vessel disease  2. Patent LIMA and SVG to OM1/2  3.  of distal RCA  4. Elevated lvedp    Plan:  Cardiac rehab    Distal RCA not amendable to PCI. Distal vessel in 2015 is small caliber, calcified with severe diffuse disease thoruhgout PDA and PL-branch. Limited benefit in  procedure. Recommend medical management of CAD.     DO Marysol Douglas DO  [facility] [address], [address] [place] [address] Office [phone]

## 2022-06-28 NOTE — Clinical Note
Multiple views of the saphenous vein graft to the diagonal, obtuse marginal and right coronary obtained using hand injection.

## 2022-06-28 NOTE — Clinical Note
Sheath #1: Sheath: inserted. Sheath inserted/placed in the left radial  artery. Upon evaluation of the common femoral artery stick using fluoroscopy, the access site puncture was within the safe zone.

## 2022-06-28 NOTE — Clinical Note
TRANSFER - IN REPORT:     Verbal report received from: David Hebert. Report consisted of patient's Situation, Background, Assessment and   Recommendations(SBAR). Opportunity for questions and clarification was provided. Assessment completed upon patient's arrival to unit and care assumed. Patient transported with a Registered Nurse.

## 2022-06-28 NOTE — CARDIO/PULMONARY
[facility] Cardiopulmonary Rehab: 79 yo male admitted for cardiac cath. S/P cath with  of distal RCA; not amenable to PCI (6/27). LVEF 58%. Pt may be eligible for cardiac rehab program with dx: CAD. Will follow up with patient by phone to discuss his interest in participating.

## 2022-06-28 NOTE — H&P
History and physical has been reviewed. The patient has been examined. There have been no significant clinical changes since the completion of the originally dated History and Physical.    Risk and benefit of cardiac catheterization/PCI was described in detail to patient.  (risk of vascular access complication with potential surgical intervention for management, stroke, myocardial infarction, emergent open heart surgery and death). Patient wishes to proceed with coronary angiography with possible intervention. Labs   Lab Results   Component Value Date/Time    WBC 7.4 2022 11:50 AM    Hemoglobin (POC) 15.6 2015 09:39 AM    HGB 14.3 2022 11:50 AM    Hematocrit (POC) 46 2015 09:39 AM    HCT 43.7 2022 11:50 AM    PLATELET 204  11:50 AM    MCV 86 2022 11:50 AM     Lab Results   Component Value Date/Time    Sodium 137 2022 11:50 AM    Potassium 4.8 2022 11:50 AM    Chloride 103 2022 11:50 AM    CO2 23 2022 11:50 AM    Anion gap 7 2019 02:04 AM    Glucose 153 (H) 2022 11:50 AM    BUN 26 2022 11:50 AM    Creatinine 1.48 (H) 2022 11:50 AM    BUN/Creatinine ratio 18 2022 11:50 AM    GFR est AA >60 2019 02:04 AM    GFR est non-AA >60 2019 02:04 AM    Calcium 9.2 2022 11:50 AM         ASA 3  Airway 3                Kai Rodríguez MD. [facility] [place]                    Patient: Yunier Santana  : 1950        Today's Date: 2022              HISTORY OF PRESENT ILLNESS:      History of Present Illness:  Has been having some SOB / ROQUE - PCP did CXR and that looked OK. No CP.   He hs ROQUE going up and down stops or a long distance.                  PAST MEDICAL HISTORY:           Past Medical History:   Diagnosis Date    CAD (coronary artery disease)       NSTEMI 3/21/15; CABG 3/24/15;  LVEF 55%    Diabetes ([facility] [address]      Dyslipidemia      FH ischemic heart disease      HTN (hypertension)      Hypertension    NSTEMI (non-ST elevated myocardial infarction) ([facility] [address]       NSTEMI 3/21/15    Obesity      MING (obstructive sleep apnea)       surgery in past    MING (obstructive sleep apnea)       on CPAP    Paronychia of great toe of right foot      S/P CABG x 4       CABG 3/24/15 - LIMA TO LAD, SEQUENTIAL VEIN GRAFT TO OM1, OM2; SVG TO DISTAL RCA                  Past Surgical History:   Procedure Laterality Date    HX APPENDECTOMY        HX HEENT         eyes               MEDICATIONS:             Current Outpatient Medications   Medication Sig Dispense Refill    sitagliptin phosphate (JANUVIA PO) Take 100 mg by mouth daily.        dilTIAZem ER (Cartia XT) 240 mg capsule Take 1 Capsule by mouth daily. TAKE 1 CAPSULE BY MOUTH ONCE DAILY 90 Capsule 3    eplerenone (INSPRA) 25 mg tablet Take 1 Tablet by mouth daily. 90 Tablet 3    carvediloL (COREG) 25 mg tablet Take 1.5 Tablets by mouth two (2) times a day. 270 Tablet 3    rosuvastatin (CRESTOR) 20 mg tablet Take 1 Tablet by mouth nightly. 90 Tablet 3    lisinopril-hydroCHLOROthiazide (PRINZIDE, ZESTORETIC) 20-12.5 mg per tablet Take 2 Tablets by mouth daily. 180 Tablet 3    insulin glargine U-300 conc (TOUJEO MAX U-300 SOLOSTAR) 300 unit/mL (3 mL) inpn 220 Units by SubCUTAneous route nightly.        acetaminophen (TYLENOL) 500 mg tablet Take 1,000 mg by mouth every six (6) hours as needed for Pain.        omeprazole (PRILOSEC) 20 mg capsule Take 20 mg by mouth daily.        Azelastine (ASTEPRO) 0.15 % (205.5 mcg) nasal spray 1 Spray daily.        montelukast (SINGULAIR) 10 mg tablet Take 10 mg by mouth every evening.        dapagliflozin (FARXIGA) 10 mg tab Take 10 mg by mouth daily.        cetirizine (ZYRTEC) 10 mg tablet Take 10 mg by mouth daily as needed for Allergies.         aspirin delayed-release 81 mg tablet Take 81 mg by mouth daily.                   Allergies   Allergen Reactions    Amoxicillin Hives    Metformin Diarrhea    Ozempic [Semaglutide] Other (comments)       GI problem                SOCIAL HISTORY:      Social History            Tobacco Use    Smoking status: Former Smoker       Types: Pipe       Quit date: 36       Years since quittin.4    Smokeless tobacco: Never Used   Substance Use Topics    Alcohol use: Yes       Alcohol/week: 0.0 standard drinks       Comment: rarely    Drug use: No            FAMILY HISTORY:            Family History   Problem Relation Age of Onset    Coronary Art Dis Mother      Coronary Art Dis Father                 REVIEW OF SYMPTOMS:      Review of Symptoms:  Constitutional: Negative for fever, chills  HEENT: Negative for nosebleeds, tinnitus, and vision changes. Respiratory: Negative for cough, wheezing  Cardiovascular: Negative for orthopnea, claudication, syncope, and PND. Gastrointestinal: Negative for abdominal pain, diarrhea, melena. Genitourinary: Negative for dysuria  Musculoskeletal: Negative for myalgias. Skin: Negative for rash  Heme: No problems bleeding. Neurological: Negative for speech change and focal weakness.            PHYSICAL EXAM:      Physical Exam:  Visit Vitals  /60 (BP 1 Location: Left upper arm, BP Patient Position: Sitting)   Pulse (!) 56   Ht 5' 5\" (1.651 m)   Wt 256 lb (116.1 kg)   SpO2 96%   BMI 42.60 kg/m²      Patient appears generally well, mood and affect are appropriate and pleasant. HEENT:  Hearing intact, non-icteric, normocephalic, atraumatic. Neck Exam: Supple, Hard to assess JVD  Lung Exam: Clear to auscultation, even breath sounds. Cardiac Exam: Regular rate and rhythm with no murmur or rub  Abdomen: Soft, non-tender, normal bowel sounds. Obese   Extremities: Moves all ext well. Trace lower extremity edema.   Psych: Appropriate affect  Neuro - Grossly intact           LABS / OTHER STUDIES:            Lab Results   Component Value Date/Time     Sodium 139 2019 02:04 AM     Potassium 4.2 2019 02:04 AM     Chloride 106 07/08/2019 02:04 AM     CO2 26 07/08/2019 02:04 AM     Anion gap 7 07/08/2019 02:04 AM     Glucose 130 (H) 07/08/2019 02:04 AM     BUN 16 07/08/2019 02:04 AM     Creatinine 0.97 07/08/2019 02:04 AM     BUN/Creatinine ratio 16 07/08/2019 02:04 AM     GFR est AA >60 07/08/2019 02:04 AM     GFR est non-AA >60 07/08/2019 02:04 AM     Calcium 8.6 07/08/2019 02:04 AM     Bilirubin, total 0.6 07/08/2019 02:04 AM     Alk. phosphatase 65 07/08/2019 02:04 AM     Protein, total 6.9 07/08/2019 02:04 AM     Albumin 3.0 (L) 07/08/2019 02:04 AM     Globulin 3.9 07/08/2019 02:04 AM     A-G Ratio 0.8 (L) 07/08/2019 02:04 AM     ALT (SGPT) 32 07/08/2019 02:04 AM     AST (SGOT) 26 07/08/2019 02:04 AM            Lab Results   Component Value Date/Time     WBC 9.0 07/08/2019 02:04 AM     Hemoglobin (POC) 15.6 03/21/2015 09:39 AM     HGB 13.2 07/08/2019 02:04 AM     Hematocrit (POC) 46 03/21/2015 09:39 AM     HCT 40.5 07/08/2019 02:04 AM     PLATELET 776 76/53/2338 02:04 AM     MCV 89.0 07/08/2019 02:04 AM               Lab Results   Component Value Date/Time     Cholesterol, total 121 03/22/2015 02:45 AM     HDL Cholesterol 26 03/22/2015 02:45 AM     LDL, calculated 41 03/22/2015 02:45 AM     VLDL, calculated 54 03/22/2015 02:45 AM     Triglyceride 270 (H) 03/22/2015 02:45 AM     CHOL/HDL Ratio 4.7 03/22/2015 02:45 AM               Lab Results   Component Value Date/Time     TSH 1.74 03/23/2015 10:02 PM               CARDIAC DIAGNOSTICS:      Cardiac Evaluation Includes:        ECHO: 3/21/2015: EF 55-60%, no WMA, mild LVH, MAC, no MR    CATH: 3/23/2015: Obstructive 3VD:dLM:80%, mLAD:80%, dLAD:70%, D1:90% mLCX: 90%, mRCA:50%, PDA: 80%, EF 55%, Focal mid inferior AK.   Intra-op WILEY 3/24/15 - postop LVEF 55%, mild MR  CABG 3/24/15 - LIMA TO LAD, SEQUENTIAL VEIN GRAFT TO OM1, OM2; SVG TO DISTAL RCA      EKG 3/21/15 - NSR, diffuse ST depression   EKG 5/16/16 - NSR, NSST changes   EKG 5/16/16 - NSR, NSST changes, PVC's   EKG 5/8/19 - NSR, non-specific T wave abn   EKG 7/2/20 - NSR, non-specific T wave abn   EKG 8/16/21 - NSR, lateral TWI (similar to prior EKG)          ASSESSMENT AND PLAN:         Assessment and Plan:  1) CAD  - NSTEMI 3/21/15; CABG 3/24/15; LVEF 55%  - On 5/25/22 - he notes new class 2 ROQUE (no orthopnea or CP) ---> Will get recent labs and CXR from PCP. He has gained some weight lately (10 lbs). Will check an echo and 2 day exercise cardiolite when possible. Will also check proBNP (and basic labs). - Continue BB, statin, ACE-I, and ASA  - I encouraged he exercise and eat healthy      2) HTN    - on multiple meds for his BP  - BP looks OK - continue meds       3) Dyslipidemia    - continue Crestor   - Lipids checked per PCP       4) Diabetes Mellitus  - working with PCP       5) See me in 6 weeks. 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 Lives with wife.  Has 2 young grand kids. Deidra Kaplan MD, [address] [place] [address], [address] [place] [address]. [address] [place] [address], [address] [place] [address], [address] [place] [address]  Ph: [phone]                                                              [phone]        ADDENDUM   6/21/2022     Lexiscan Cardiolite 6/16/22 - Attempted to walk paitent on treadmill reaching a max HR of 97, mets 5.7 stopping secondary to hip pain. Protocol switched to [facility]. No CP.    Stress ECG: Ischemic ST changes with 1 mm downsloping ST depression in the inferior leads after Lexiscan injection.    Perfusion Comments: LV perfusion is abnormal.  There is a moderate sized, moderate grade, partially reversible defect with minimal reversibility.  Imaging improves just minimally with prone imaging.  SSS = 11, SRS = 6, SDS = 4.  Findings consistent with a small to moderate sized inferior wall infarct with mild reversibility.  A small degree of diaphragmatic attenuation also likely present. LVEF 63%.     Echo 6/17/22 - TDS. LVEF 58%. Grade 2 diastology. Mild LAE.     Consider a cath for symptoms and abnormal stress test.   Will call         ADDENDUM   6/23/2022  I called and spoke to patient. He has ROQUE with light to moderate exertion despite a good cardiac regimen. Reviewed options and he agrees to proceed with a cardiac cath (reviewed risks of MI, CVA, TIMUR, death, etc).    Will have nurse set up cath.             Electronically signed by Mary Ann Jensen MD at 05/25/22 [id number]   Electronically signed by Mary Ann Jensen MD at 06/21/22 2648   Electronically signed by Mary Ann Jensen MD at 06/23/22 [id number]

## 2022-06-28 NOTE — DISCHARGE INSTRUCTIONS
Transradial Cardiac Catheterization/Angiography Discharge Instructions    It is normal to feel tired the first couple days. Take it easy and follow the physician's instructions. Wear wrist splint for first 24 hours to keep the wrist stable. No repetitive flexing of wrist.       CHECK THE CATHETER INSERTION SITE DAILY:  Remove the wrist dressing 24 hours after the procedure. You may shower 24 hours after the procedure. Wash with soap and water and pat dry. Gentle cleaning of the site with soap and water is sufficient, cover with a dry clean dressing or bandage. Do not apply creams or powders to the area. No soaking the wrist for 5 days. Leave the puncture site open to air after 24 hours post-procedure. CALL THE PHYSICIANS:   If you have signs of infection, such as:            - A fever  If the site becomes red, swollen or feels warm to the touch  If there is drainage or if there is unusual pain at the radial site. MONITOR FOR BLEEDING:    If there is any minor oozing, you may apply a band-aid and remove after 12 hours. If the bleeding continues or if there is a lot of bleeding hold pressure with the middle finger against the puncture site and the thumb against the back of the wrist,call 911 to be transported to the hospital.  DO NOT DRIVE YOURSELF, [place] [address]. ACTIVITY:   For the first 24 hours do not manipulate the wrist.  No lifting, pushing or pulling over 3-5 pounds with the affected wrist for 7 daysand no straining the insertion site. Do not life grocery bags or the garbage can, do not run the vacuum  or  for 7 days. Start with short walks as in the hospital and gradually increase as tolerated each day. It is recommended to walk 30 minutes 5-7 days per week. Follow your physician's instructions on activity. Avoid walking outside in extremes of heat or cold. Walk inside when it is cold and windy or hot and humid.      Things to keep in mind:  No driving for at least 24 hours, or as designated by your physician. Limit the number of times you go up and down the stairs  Take rests and pace yourself with activity. Be careful and do not strain with bowel movements. Diet:  Drink plenty of fluids for the next 24 - 48 hours to help your body flush out the dye. If you have kidney, heart, or liver disease and have to limit fluids, talk with your doctor before you increase the amount of fluids you drink.   Keep eating a heart-healthy, low-fat diet that has lots of fruits, vegetables, and whole grains

## 2022-06-28 NOTE — Clinical Note
TRANSFER - OUT REPORT:     Verbal report given to: Annie Argueta. Report consisted of patient's Situation, Background, Assessment and   Recommendations(SBAR). Opportunity for questions and clarification was provided. Patient transported with a Registered Nurse.

## 2022-06-28 NOTE — ROUTINE PROCESS
6:54 AM  Patient arrived. ID and allergies verified verbally with patient. Pt voices understanding of procedure to be performed. Consent obtained. Pt prepped for procedure. 8:05 AM  TRANSFER - OUT REPORT:    Verbal report given to juan carlos billingsley.(name) on [facility]  being transferred to cath lab(unit) for ordered procedure       Report consisted of patients Situation, Background, Assessment and   Recommendations(SBAR). Information from the following report(s) SBAR was reviewed with the receiving nurse. Lines:   Peripheral IV 06/28/22 Anterior;Proximal;Right Forearm (Active)   Site Assessment Clean, dry, & intact 06/28/22 0728   Phlebitis Assessment 0 06/28/22 0728   Dressing Status Clean, dry, & intact 06/28/22 0728   Dressing Type Transparent 06/28/22 0728   Hub Color/Line Status Pink 06/28/22 0728   Alcohol Cap Used Yes 06/28/22 0728        Opportunity for questions and clarification was provided. Patient transported with:   Registered Nurse    9:05 AM  TRANSFER - IN REPORT:    Verbal report received from juan carlos Bernard.(name) on [facility]  being received from cath lab(unit) for routine progression of care      Report consisted of patients Situation, Background, Assessment and   Recommendations(SBAR). Information from the following report(s) Procedure Summary was reviewed with the receiving nurse. Opportunity for questions and clarification was provided. Assessment completed upon patients arrival to unit and care assumed. 9:23 AM  patient meets criteria for outpatient cardiac rehab. [facility] outpatient cardiac rehab referral will be initiated. Educational handouts provided on: coronary artery disease, coronary artery risk factors, heart healthy eating, and community resources. Reference information on [address] Outpatient Cardiac Rehab Program also provided.  [address] [facility] cardiac rehab staff will contact patent, per telephone call, to assess and schedule program participation    10:25 AM  2 ml air released from TR Band. No bleeding or hematoma noted. Radial and Ulnar pulse on L wrist palpable. Pt tolerated well. Will continue to monitor. 10:30 AM  3 ml air released from TR Band. No bleeding or hematoma noted. Radial and Ulnar pulse on L wrist palpable. Pt tolerated well. Will continue to monitor. 10:43 AM  4 ml air released from TR Band. No bleeding or hematoma noted. Radial and Ulnar pulse on L wrist palpable. Pt tolerated well. Will continue to monitor. Air release completed. TR Band removed from L wrist. No bleeding or  Hematoma. Dressing applied. Wrist immobilizer in place. Radial and ulnar pulse remain palpable on affected extremity. Pt tolerated well. Instructions given to pt regarding movement and activity restrictions. Pt voiced understanding. 1130  Patient ambulates in hallway or on unit. 1140  Discharge instructions reviewed with patient and family. Voiced understanding. Patient given copy of discharge instructions to take home. 1150  Pt discharged via wheelchair with wife. Personal belongings with patient upon discharge.

## 2022-06-30 ENCOUNTER — TELEPHONE (OUTPATIENT)
Dept: CARDIAC REHAB | Age: 72
End: 2022-06-30

## 2022-06-30 NOTE — TELEPHONE ENCOUNTER
[facility] Cardiopulmonary Rehab: Spoke to patient about his eligibility for outpatient cardiac rehab program, s/p cath: Sally with  (6/28). Dr Eula Marshall recommended med mgmt of CAD. Pt reported he is on his wife's health insurance. Pt expressed interest in participating in cardiac rehab at [facility]. Scheduled intake appt on 7/7/22. Explained need to arrive 20 minutes early to complete paperwork. Pt aware he has appt with Dr Amish Serrato on 7/12/22 at [address] [facility] office.

## 2022-07-01 ENCOUNTER — TRANSCRIBE ORDER (OUTPATIENT)
Dept: CARDIAC REHAB | Age: 72
End: 2022-07-01

## 2022-07-01 DIAGNOSIS — Z95.5 STENTED CORONARY ARTERY: Primary | ICD-10-CM

## 2022-07-07 ENCOUNTER — HOSPITAL ENCOUNTER (OUTPATIENT)
Dept: CARDIAC REHAB | Age: 72
Discharge: HOME | End: 2022-07-07
Payer: COMMERCIAL

## 2022-07-07 VITALS — BODY MASS INDEX: 43.02 KG/M2 | WEIGHT: 258.2 LBS | HEIGHT: 65 IN

## 2022-07-07 PROCEDURE — 93798 PHYS/QHP OP CAR RHAB W/ECG: CPT

## 2022-07-07 RX ORDER — INSULIN LISPRO 100 [IU]/ML
INJECTION, SOLUTION INTRAVENOUS; SUBCUTANEOUS
COMMUNITY

## 2022-07-07 NOTE — CARDIO/PULMONARY
[facility]- Intake Note  Laviniapeyton KELLEY 1950 presented for cardiac rehab orientation and exercise tolerance test today with a primary diagnosis of CAD. Pt has history of MI, CABG. LVEF is 58% via ECHO 22 . Cardiac risk factors include: age, gender, HTN, HLD, DM, obesity, edentary lifestyle, family history. CAD risk factors were reviewed with patient. Pt is   and lives with his wife and daughter. He is retired. Depression score PHQ9 is 4 and this is considered normal.   Patient denied chest pain or SOB during 6 minute exercise tolerance test on the [employer]- Level 1  RPM 28-34. RPE was 11. Cardiac rhythm was SR without ectopy. Exercise plan was developed. Pt will attend cardiac rehab 2-3 times per week. Cardiac Rehab book reviewed and given to patient. [employer]

## 2022-07-08 ENCOUNTER — HOSPITAL ENCOUNTER (OUTPATIENT)
Dept: CARDIAC REHAB | Age: 72
Discharge: HOME | End: 2022-07-08
Payer: COMMERCIAL

## 2022-07-08 VITALS — BODY MASS INDEX: 43.47 KG/M2 | WEIGHT: 261.2 LBS

## 2022-07-08 PROCEDURE — 93798 PHYS/QHP OP CAR RHAB W/ECG: CPT

## 2022-07-11 ENCOUNTER — HOSPITAL ENCOUNTER (OUTPATIENT)
Dept: CARDIAC REHAB | Age: 72
Discharge: HOME | End: 2022-07-11
Payer: COMMERCIAL

## 2022-07-11 VITALS — WEIGHT: 260 LBS | BODY MASS INDEX: 43.27 KG/M2

## 2022-07-11 PROCEDURE — 93798 PHYS/QHP OP CAR RHAB W/ECG: CPT

## 2022-07-12 ENCOUNTER — OFFICE VISIT (OUTPATIENT)
Dept: CARDIOLOGY CLINIC | Age: 72
End: 2022-07-12
Payer: COMMERCIAL

## 2022-07-12 VITALS
SYSTOLIC BLOOD PRESSURE: 138 MMHG | DIASTOLIC BLOOD PRESSURE: 70 MMHG | WEIGHT: 259 LBS | OXYGEN SATURATION: 97 % | BODY MASS INDEX: 43.15 KG/M2 | HEART RATE: 77 BPM | HEIGHT: 65 IN

## 2022-07-12 DIAGNOSIS — I10 PRIMARY HYPERTENSION: ICD-10-CM

## 2022-07-12 DIAGNOSIS — E78.5 DYSLIPIDEMIA: ICD-10-CM

## 2022-07-12 DIAGNOSIS — I25.118 CORONARY ARTERY DISEASE OF NATIVE ARTERY OF NATIVE HEART WITH STABLE ANGINA PECTORIS (HCC): Primary | ICD-10-CM

## 2022-07-12 PROCEDURE — 1101F PT FALLS ASSESS-DOCD LE1/YR: CPT | Performed by: SPECIALIST

## 2022-07-12 PROCEDURE — 3017F COLORECTAL CA SCREEN DOC REV: CPT | Performed by: SPECIALIST

## 2022-07-12 PROCEDURE — G8754 DIAS BP LESS 90: HCPCS | Performed by: SPECIALIST

## 2022-07-12 PROCEDURE — G8427 DOCREV CUR MEDS BY ELIG CLIN: HCPCS | Performed by: SPECIALIST

## 2022-07-12 PROCEDURE — G8417 CALC BMI ABV UP PARAM F/U: HCPCS | Performed by: SPECIALIST

## 2022-07-12 PROCEDURE — 93000 ELECTROCARDIOGRAM COMPLETE: CPT | Performed by: SPECIALIST

## 2022-07-12 PROCEDURE — 99214 OFFICE O/P EST MOD 30 MIN: CPT | Performed by: SPECIALIST

## 2022-07-12 PROCEDURE — G8536 NO DOC ELDER MAL SCRN: HCPCS | Performed by: SPECIALIST

## 2022-07-12 PROCEDURE — G8432 DEP SCR NOT DOC, RNG: HCPCS | Performed by: SPECIALIST

## 2022-07-12 PROCEDURE — G8752 SYS BP LESS 140: HCPCS | Performed by: SPECIALIST

## 2022-07-12 PROCEDURE — 1123F ACP DISCUSS/DSCN MKR DOCD: CPT | Performed by: SPECIALIST

## 2022-07-12 NOTE — PATIENT INSTRUCTIONS
DASH Diet: Care Instructions  Your Care Instructions     The DASH diet is an eating plan that can help lower your blood pressure. DASH stands for Dietary Approaches to Stop Hypertension. Hypertension is high blood pressure. The DASH diet focuses on eating foods that are high in calcium, potassium, and magnesium. These nutrients can lower blood pressure. The foods that are highest in these nutrients are fruits, vegetables, low-fat dairy products, nuts, seeds, and legumes. But taking calcium, potassium, and magnesium supplements instead of eating foods that are high in those nutrients does not have the same effect. The DASH diet also includes whole grains, fish, and poultry. The DASH diet is one of several lifestyle changes your doctor may recommend to lower your high blood pressure. Your doctor may also want you to decrease the amount of sodium in your diet. Lowering sodium while following the DASH diet can lower blood pressure even further than just the DASH diet alone. Follow-up care is a key part of your treatment and safety. Be sure to make and go to all appointments, and call your doctor if you are having problems. It's also a good idea to know your test results and keep a list of the medicines you take. How can you care for yourself at home? Following the DASH diet  · Eat 4 to 5 servings of fruit each day. A serving is 1 medium-sized piece of fruit, ½ cup chopped or canned fruit, 1/4 cup dried fruit, or 4 ounces (½ cup) of fruit juice. Choose fruit more often than fruit juice. · Eat 4 to 5 servings of vegetables each day. A serving is 1 cup of lettuce or raw leafy vegetables, ½ cup of chopped or cooked vegetables, or 4 ounces (½ cup) of vegetable juice. Choose vegetables more often than vegetable juice. · Get 2 to 3 servings of low-fat and fat-free dairy each day. A serving is 8 ounces of milk, 1 cup of yogurt, or 1 ½ ounces of cheese. · Eat 6 to 8 servings of grains each day.  A serving is 1 slice of bread, 1 ounce of dry cereal, or ½ cup of cooked rice, pasta, or cooked cereal. Try to choose whole-grain products as much as possible. · Limit lean meat, poultry, and fish to 2 servings each day. A serving is 3 ounces, about the size of a deck of cards. · Eat 4 to 5 servings of nuts, seeds, and legumes (cooked dried beans, lentils, and split peas) each week. A serving is 1/3 cup of nuts, 2 tablespoons of seeds, or ½ cup of cooked beans or peas. · Limit fats and oils to 2 to 3 servings each day. A serving is 1 teaspoon of vegetable oil or 2 tablespoons of salad dressing. · Limit sweets and added sugars to 5 servings or less a week. A serving is 1 tablespoon jelly or jam, ½ cup sorbet, or 1 cup of lemonade. · Eat less than 2,300 milligrams (mg) of sodium a day. If you limit your sodium to 1,500 mg a day, you can lower your blood pressure even more. · Be aware that all of these are the suggested number of servings for people who eat 1,800 to 2,000 calories a day. Your recommended number of servings may be different if you need more or fewer calories. Tips for success  · Start small. Do not try to make dramatic changes to your diet all at once. You might feel that you are missing out on your favorite foods and then be more likely to not follow the plan. Make small changes, and stick with them. Once those changes become habit, add a few more changes. · Try some of the following:  ? Make it a goal to eat a fruit or vegetable at every meal and at snacks. This will make it easy to get the recommended amount of fruits and vegetables each day. ? Try yogurt topped with fruit and nuts for a snack or healthy dessert. ? Add lettuce, tomato, cucumber, and onion to sandwiches. ? Combine a ready-made pizza crust with low-fat mozzarella cheese and lots of vegetable toppings. Try using tomatoes, squash, spinach, broccoli, carrots, cauliflower, and onions. ?  Have a variety of cut-up vegetables with a low-fat dip as an appetizer instead of chips and dip. ? Sprinkle sunflower seeds or chopped almonds over salads. Or try adding chopped walnuts or almonds to cooked vegetables. ? Try some vegetarian meals using beans and peas. Add garbanzo or kidney beans to salads. Make burritos and tacos with mashed rojas beans or black beans. Where can you learn more? Go to http://www.ron.com/  Enter H967 in the search box to learn more about \"DASH Diet: Care Instructions. \"  Current as of: January 10, 2022               Content Version: 13.2  © 7384-4355 [employer]. Care instructions adapted under license by [employer] (which disclaims liability or warranty for this information). If you have questions about a medical condition or this instruction, always ask your healthcare professional. [address] any warranty or liability for your use of this information.

## 2022-07-12 NOTE — PROGRESS NOTES
Chief Complaint   Patient presents with    Follow-up     6 wk     Coronary Artery Disease    Hypertension    Cholesterol Problem     There were no vitals taken for this visit.   Chest pain denied   SOB YES but  somewhat better then before    Palpitations denied   Swelling in hands/feet denied   Dizziness denied   Recent hospital stays denied   Refills denied   Vitals:    07/12/22 0901 07/12/22 0909   BP: (!) 154/80 138/70   BP 1 Location: Right arm Right upper arm   BP Patient Position: Sitting Sitting   Pulse: 77    Height: 5' 5\" (1.651 m)    Weight: 259 lb (117.5 kg)    SpO2: 97%

## 2022-07-12 NOTE — PROGRESS NOTES
Lynne Carey MD. Formerly [facility]              Patient: Hayes Santana  : 1950      Today's Date: 2022          HISTORY OF PRESENT ILLNESS:     History of Present Illness:  Has class 2 ROQUE - exercising in cardiac rehab. No CP. No orthopnea. PAST MEDICAL HISTORY:     Past Medical History:   Diagnosis Date    CAD (coronary artery disease)     NSTEMI 3/21/15; CABG 3/24/15;  LVEF 55%    Diabetes [facility] [address]     Dyslipidemia     FH ischemic heart disease     GERD (gastroesophageal reflux disease)     HTN (hypertension)     Hypertension     NSTEMI (non-ST elevated myocardial infarction) ([facility] [address]     NSTEMI 3/21/15    Obesity     MING (obstructive sleep apnea)     surgery in past    MING (obstructive sleep apnea)     on CPAP    Paronychia of great toe of right foot     S/P CABG x 4     CABG 3/24/15 - LIMA TO LAD, SEQUENTIAL VEIN GRAFT TO OM1, OM2; SVG TO DISTAL RCA         Past Surgical History:   Procedure Laterality Date    HX APPENDECTOMY      HX HEENT      eyes    HX ORTHOPAEDIC      L foot 4th toe amputation (diabetes)    [place] CARDIAC SURG PROCEDURE UNLIST      CABG 3/24/2015           MEDICATIONS:     Current Outpatient Medications   Medication Sig Dispense Refill    insulin lispro (HumaLOG U-100 Insulin) 100 unit/mL injection by SubCUTAneous route three (3) times daily as needed (sliging scale pre meals). Indications: type 2 diabetes mellitus      sitagliptin phosphate (JANUVIA PO) Take 100 mg by mouth daily.  dilTIAZem ER (Cartia XT) 240 mg capsule Take 1 Capsule by mouth daily. TAKE 1 CAPSULE BY MOUTH ONCE DAILY 90 Capsule 3    eplerenone (INSPRA) 25 mg tablet Take 1 Tablet by mouth daily. 90 Tablet 3    carvediloL (COREG) 25 mg tablet Take 1.5 Tablets by mouth two (2) times a day. 270 Tablet 3    rosuvastatin (CRESTOR) 20 mg tablet Take 1 Tablet by mouth nightly.  90 Tablet 3    lisinopril-hydroCHLOROthiazide (PRINZIDE, ZESTORETIC) 20-12.5 mg per tablet Take 2 Tablets by mouth daily. (Patient taking differently: Take 1 Tablet by mouth daily. ) 180 Tablet 3    insulin glargine U-300 conc (TOUJEO MAX U-300 SOLOSTAR) 300 unit/mL (3 mL) inpn 220 Units by SubCUTAneous route nightly.  acetaminophen (TYLENOL) 500 mg tablet Take 1,000 mg by mouth every six (6) hours as needed for Pain.  omeprazole (PRILOSEC) 20 mg capsule Take 20 mg by mouth daily.  Azelastine (ASTEPRO) 0.15 % (205.5 mcg) nasal spray 1 Spray daily.  montelukast (SINGULAIR) 10 mg tablet Take 10 mg by mouth every evening.  dapagliflozin (FARXIGA) 10 mg tab Take 10 mg by mouth daily.  cetirizine (ZYRTEC) 10 mg tablet Take 10 mg by mouth daily as needed for Allergies.  aspirin delayed-release 81 mg tablet Take 81 mg by mouth daily. Allergies   Allergen Reactions    Amoxicillin Hives    Metformin Diarrhea    Ozempic [Semaglutide] Other (comments)     GI problem            SOCIAL HISTORY:     Social History     Tobacco Use    Smoking status: Former Smoker     Types: Pipe     Quit date:      Years since quittin.5    Smokeless tobacco: Never Used   Substance Use Topics    Alcohol use: Yes     Alcohol/week: 0.0 standard drinks     Comment: rarely    Drug use: No         FAMILY HISTORY:     Family History   Problem Relation Age of Onset    Coronary Art Dis Mother     Coronary Art Dis Father     Diabetes Brother     Hypertension Brother     Cancer Sister            REVIEW OF SYMPTOMS:     Review of Symptoms:  Constitutional: Negative for fever, chills  HEENT: Negative for nosebleeds, tinnitus, and vision changes. Respiratory: Negative for cough, wheezing  Cardiovascular: Negative for orthopnea, claudication, syncope, and PND. Gastrointestinal: Negative for abdominal pain, diarrhea, melena. Genitourinary: Negative for dysuria  Musculoskeletal: Negative for myalgias. Skin: Negative for rash  Heme: No problems bleeding.   Neurological: Negative for speech change and focal weakness. PHYSICAL EXAM:     Physical Exam:  Visit Vitals  /70 (BP 1 Location: Right upper arm, BP Patient Position: Sitting)   Pulse 77   Ht 5' 5\" (1.651 m)   Wt 259 lb (117.5 kg)   SpO2 97%   BMI 43.10 kg/m²     Patient appears generally well, mood and affect are appropriate and pleasant. HEENT:  Hearing intact, non-icteric, normocephalic, atraumatic. Neck Exam: Supple, Hard to assess JVD  Lung Exam: Clear to auscultation, even breath sounds. Cardiac Exam: Regular rate and rhythm with no murmur or rub  Abdomen: Soft, non-tender, normal bowel sounds. Obese   Extremities: Moves all ext well. Trace lower extremity edema. Psych: Appropriate affect  Neuro - Grossly intact        LABS / OTHER STUDIES:     Lab Results   Component Value Date/Time    Sodium 137 06/13/2022 11:50 AM    Potassium 4.8 06/13/2022 11:50 AM    Chloride 103 06/13/2022 11:50 AM    CO2 23 06/13/2022 11:50 AM    Anion gap 7 07/08/2019 02:04 AM    Glucose 153 (H) 06/13/2022 11:50 AM    BUN 26 06/13/2022 11:50 AM    Creatinine 1.48 (H) 06/13/2022 11:50 AM    BUN/Creatinine ratio 18 06/13/2022 11:50 AM    GFR est AA >60 07/08/2019 02:04 AM    GFR est non-AA >60 07/08/2019 02:04 AM    Calcium 9.2 06/13/2022 11:50 AM    Bilirubin, total 0.5 06/13/2022 11:50 AM    Alk.  phosphatase 82 06/13/2022 11:50 AM    Protein, total 6.4 06/13/2022 11:50 AM    Albumin 4.0 06/13/2022 11:50 AM    Globulin 3.9 07/08/2019 02:04 AM    A-G Ratio 1.7 06/13/2022 11:50 AM    ALT (SGPT) 37 06/13/2022 11:50 AM    AST (SGOT) 33 06/13/2022 11:50 AM     Lab Results   Component Value Date/Time    WBC 7.4 06/13/2022 11:50 AM    Hemoglobin (POC) 15.6 03/21/2015 09:39 AM    HGB 14.3 06/13/2022 11:50 AM    Hematocrit (POC) 46 03/21/2015 09:39 AM    HCT 43.7 06/13/2022 11:50 AM    PLATELET 482 75/98/9884 11:50 AM    MCV 86 06/13/2022 11:50 AM       Lab Results   Component Value Date/Time    Cholesterol, total 96 (L) 06/13/2022 11:50 AM    HDL Cholesterol 24 (L) 06/13/2022 11:50 AM    LDL, calculated 36 06/13/2022 11:50 AM    LDL, calculated 41 03/22/2015 02:45 AM    VLDL, calculated 36 06/13/2022 11:50 AM    VLDL, calculated 54 03/22/2015 02:45 AM    Triglyceride 230 (H) 06/13/2022 11:50 AM    CHOL/HDL Ratio 4.7 03/22/2015 02:45 AM       Lab Results   Component Value Date/Time    TSH 2.380 06/13/2022 11:50 AM       Lab Results   Component Value Date/Time    Hemoglobin A1c 8.5 (H) 07/04/2019 03:09 PM    Hemoglobin A1c, External 7.8 10/20/2020 12:00 AM          CARDIAC DIAGNOSTICS:      Cardiac Evaluation Includes:        ECHO: 3/21/2015: EF 55-60%, no WMA, mild LVH, MAC, no MR    CATH: 3/23/2015: Obstructive 3VD:dLM:80%, mLAD:80%, dLAD:70%, D1:90% mLCX: 90%, mRCA:50%, PDA: 80%, EF 55%, Focal mid inferior AK. Intra-op WILEY 3/24/15 - postop LVEF 55%, mild MR  CABG 3/24/15 - LIMA TO LAD, SEQUENTIAL VEIN GRAFT TO OM1, OM2; SVG TO DISTAL RCA    Lexiscan Cardiolite 6/16/22 - Attempted to walk paitent on treadmill reaching a max HR of 97, mets 5.7 stopping secondary to hip pain. Protocol switched to [facility]. No CP. Stress ECG: Ischemic ST changes with 1 mm downsloping ST depression in the inferior leads after Lexiscan injection. Perfusion Comments: LV perfusion is abnormal.  There is a moderate sized, moderate grade, partially reversible defect with minimal reversibility. Imaging improves just minimally with prone imaging. SSS = 11, SRS = 6, SDS = 4. Findings consistent with a small to moderate sized inferior wall infarct with mild reversibility. A small degree of diaphragmatic attenuation also likely present. LVEF 63%. Echo 6/17/22 - TDS. LVEF 58%. Grade 2 diastology. Mild LAE. Cardiac cath 6/28/22 -   Severe diffuse native vessel disease. Patent LIMA and SVG to OM1/2.  of distal RCA.   Elevated lvedp.   ---> Plan:  Distal RCA not amendable to PCI.  Distal vessel in 2015 is small caliber, calcified with severe diffuse disease thoruhgout PDA and PL-branch.  Limited benefit in  procedure.  Recommend medical management of CAD.        EKG 3/21/15 - NSR, diffuse ST depression   EKG 5/16/16 - NSR, NSST changes   EKG 5/16/16 - NSR, NSST changes, PVC's   EKG 5/8/19 - NSR, non-specific T wave abn   EKG 7/2/20 - NSR, non-specific T wave abn   EKG 8/16/21 - NSR, lateral TWI (similar to prior EKG)          ASSESSMENT AND PLAN:         Assessment and Plan:  1) CAD  - NSTEMI 3/21/15; CABG 3/24/15; LVEF 55%  - Cardiac cath 6/28/22 -   Severe diffuse native vessel disease. Patent LIMA and SVG to OM1/2.  of distal RCA. Elevated lvedp.   ---> Plan:  Distal RCA not amendable to PCI.  Distal vessel in 2015 is small caliber, calcified with severe diffuse disease thoruhgout PDA and PL-branch.  Limited benefit in  procedure.  Recommend medical management of CAD. - still with class 2 ROQUE.    - He is doing cardiac rehab. - Continue BB, statin, ACE-I, and ASA  - I encouraged he exercise and eat healthy     2) HTN    - on multiple meds for his BP  - BP looks OK - continue meds       3) Dyslipidemia    - continue Crestor - LDL was < 40  - check fasting lipids with NMR lipoprofile       4) Diabetes Mellitus  - working with PCP       5) See me in 6 months. He works maintenance for [employer]. Lives with wife. Has 2 young grand kids.  Renovating Kitchen and bathrooms.          Verta Holstein, MD, [address] [facility]  [address], [address] [address]                [address] [facility]  [address], 6 [facility], [address] [place] [address]  Ph: [phone]                                                              [phone]

## 2022-07-13 ENCOUNTER — HOSPITAL ENCOUNTER (OUTPATIENT)
Dept: CARDIAC REHAB | Age: 72
Discharge: HOME | End: 2022-07-13
Payer: COMMERCIAL

## 2022-07-13 VITALS — WEIGHT: 259.4 LBS | BODY MASS INDEX: 43.17 KG/M2

## 2022-07-13 PROCEDURE — 93797 PHYS/QHP OP CAR RHAB WO ECG: CPT

## 2022-07-13 PROCEDURE — 93798 PHYS/QHP OP CAR RHAB W/ECG: CPT

## 2022-07-14 LAB — HBA1C MFR BLD HPLC: 8 %

## 2022-07-15 ENCOUNTER — HOSPITAL ENCOUNTER (OUTPATIENT)
Dept: CARDIAC REHAB | Age: 72
Discharge: HOME | End: 2022-07-15
Payer: COMMERCIAL

## 2022-07-15 VITALS — WEIGHT: 258 LBS | BODY MASS INDEX: 42.93 KG/M2

## 2022-07-15 PROCEDURE — 93798 PHYS/QHP OP CAR RHAB W/ECG: CPT

## 2022-07-18 ENCOUNTER — HOSPITAL ENCOUNTER (OUTPATIENT)
Dept: CARDIAC REHAB | Age: 72
Discharge: HOME | End: 2022-07-18
Payer: COMMERCIAL

## 2022-07-18 VITALS — BODY MASS INDEX: 43.63 KG/M2 | WEIGHT: 262.2 LBS

## 2022-07-18 PROCEDURE — 93798 PHYS/QHP OP CAR RHAB W/ECG: CPT

## 2022-07-21 ENCOUNTER — HOSPITAL ENCOUNTER (OUTPATIENT)
Dept: CARDIAC REHAB | Age: 72
Discharge: HOME | End: 2022-07-21
Payer: COMMERCIAL

## 2022-07-21 VITALS — BODY MASS INDEX: 43.4 KG/M2 | WEIGHT: 260.8 LBS

## 2022-07-21 PROCEDURE — 93797 PHYS/QHP OP CAR RHAB WO ECG: CPT

## 2022-07-21 PROCEDURE — 93798 PHYS/QHP OP CAR RHAB W/ECG: CPT

## 2022-07-22 ENCOUNTER — APPOINTMENT (OUTPATIENT)
Dept: CARDIAC REHAB | Age: 72
End: 2022-07-22
Payer: COMMERCIAL

## 2022-07-25 ENCOUNTER — HOSPITAL ENCOUNTER (OUTPATIENT)
Dept: CARDIAC REHAB | Age: 72
Discharge: HOME | End: 2022-07-25
Payer: COMMERCIAL

## 2022-07-25 VITALS — BODY MASS INDEX: 43.37 KG/M2 | WEIGHT: 260.6 LBS

## 2022-07-25 PROCEDURE — 93798 PHYS/QHP OP CAR RHAB W/ECG: CPT

## 2022-07-27 ENCOUNTER — HOSPITAL ENCOUNTER (OUTPATIENT)
Dept: CARDIAC REHAB | Age: 72
Discharge: HOME | End: 2022-07-27
Payer: COMMERCIAL

## 2022-07-27 VITALS — BODY MASS INDEX: 43.1 KG/M2 | WEIGHT: 259 LBS

## 2022-07-27 PROCEDURE — 93798 PHYS/QHP OP CAR RHAB W/ECG: CPT

## 2022-07-29 ENCOUNTER — APPOINTMENT (OUTPATIENT)
Dept: CARDIAC REHAB | Age: 72
End: 2022-07-29
Payer: COMMERCIAL

## 2022-08-01 ENCOUNTER — HOSPITAL ENCOUNTER (OUTPATIENT)
Dept: CARDIAC REHAB | Age: 72
Discharge: HOME | End: 2022-08-01
Payer: COMMERCIAL

## 2022-08-01 VITALS — BODY MASS INDEX: 43.3 KG/M2 | WEIGHT: 260.2 LBS

## 2022-08-01 PROCEDURE — 93798 PHYS/QHP OP CAR RHAB W/ECG: CPT

## 2022-08-03 ENCOUNTER — HOSPITAL ENCOUNTER (OUTPATIENT)
Dept: CARDIAC REHAB | Age: 72
Discharge: HOME | End: 2022-08-03
Payer: COMMERCIAL

## 2022-08-03 VITALS — BODY MASS INDEX: 42.9 KG/M2 | WEIGHT: 257.8 LBS

## 2022-08-03 PROCEDURE — 93798 PHYS/QHP OP CAR RHAB W/ECG: CPT

## 2022-08-04 ENCOUNTER — APPOINTMENT (OUTPATIENT)
Dept: CARDIAC REHAB | Age: 72
End: 2022-08-04
Payer: COMMERCIAL

## 2022-08-09 ENCOUNTER — HOSPITAL ENCOUNTER (OUTPATIENT)
Dept: CARDIAC REHAB | Age: 72
Discharge: HOME | End: 2022-08-09
Payer: COMMERCIAL

## 2022-08-09 VITALS — WEIGHT: 258.4 LBS | BODY MASS INDEX: 43 KG/M2

## 2022-08-09 PROCEDURE — 93798 PHYS/QHP OP CAR RHAB W/ECG: CPT

## 2022-08-10 ENCOUNTER — HOSPITAL ENCOUNTER (OUTPATIENT)
Dept: CARDIAC REHAB | Age: 72
Discharge: HOME | End: 2022-08-10
Payer: COMMERCIAL

## 2022-08-10 VITALS — BODY MASS INDEX: 42.97 KG/M2 | WEIGHT: 258.2 LBS

## 2022-08-10 PROCEDURE — 93798 PHYS/QHP OP CAR RHAB W/ECG: CPT

## 2022-08-12 ENCOUNTER — APPOINTMENT (OUTPATIENT)
Dept: CARDIAC REHAB | Age: 72
End: 2022-08-12
Payer: COMMERCIAL

## 2022-08-15 ENCOUNTER — HOSPITAL ENCOUNTER (OUTPATIENT)
Dept: CARDIAC REHAB | Age: 72
Discharge: HOME | End: 2022-08-15
Payer: COMMERCIAL

## 2022-08-15 VITALS — WEIGHT: 259.4 LBS | BODY MASS INDEX: 43.17 KG/M2

## 2022-08-15 PROCEDURE — 93798 PHYS/QHP OP CAR RHAB W/ECG: CPT

## 2022-08-15 PROCEDURE — 93797 PHYS/QHP OP CAR RHAB WO ECG: CPT | Performed by: DIETITIAN, REGISTERED

## 2022-08-15 NOTE — CARDIO/PULMONARY
Cardiac Rehab Nutrition Assessment - 1:1 Evaluation           NAME: Ponce Santana : 1950 AGE: 71 y.o. GENDER: male  CARDIAC REHAB ADMITTING DIAGNOSIS: CAD    PROBLEM LIST:  Patient Active Problem List   Diagnosis Code    CAD (coronary artery disease) I25.10    HTN (hypertension) I10    MING (obstructive sleep apnea) G47.33    Obesity E66.9    S/P CABG x 4 Z95.1    Diabetic foot infection (HCC) E11.628, L08.9    Elevated serum creatinine R79.89    DM type 2 with diabetic peripheral neuropathy (HCC) E11.42    Dyslipidemia E78.5    Osteomyelitis of third toe of left foot (HCC) M86.9           LABS:   Lab Results   Component Value Date/Time    Hemoglobin A1c 8.5 (H) 2019 03:09 PM    Hemoglobin A1c, External 8.0 2022 12:00 AM     Lab Results   Component Value Date/Time    Cholesterol, total 96 (L) 2022 11:50 AM    HDL Cholesterol 24 (L) 2022 11:50 AM    LDL, calculated 36 2022 11:50 AM    LDL, calculated 41 2015 02:45 AM    VLDL, calculated 36 2022 11:50 AM    VLDL, calculated 54 2015 02:45 AM    Triglyceride 230 (H) 2022 11:50 AM    CHOL/HDL Ratio 4.7 2015 02:45 AM     SMBG 6x/day with Freestyle Lulu, average 50-58% of time in range with majority out of range high vs low    MEDICATIONS/SUPPLEMENTS:   [unfilled]  Prior to Admission medications    Medication Sig Start Date End Date Taking? Authorizing Provider   insulin lispro (HumaLOG U-100 Insulin) 100 unit/mL injection by SubCUTAneous route three (3) times daily as needed (sliging scale pre meals). Indications: type 2 diabetes mellitus    Provider, Historical   sitagliptin phosphate (JANUVIA PO) Take 100 mg by mouth daily. Provider, Historical   dilTIAZem ER (Cartia XT) 240 mg capsule Take 1 Capsule by mouth daily. TAKE 1 CAPSULE BY MOUTH ONCE DAILY 21   Sara Oneal MD   eplerenone [facility]) 25 mg tablet Take 1 Tablet by mouth daily.  21   Sara Oenal MD   carvediloL (COREG) 25 mg tablet Take 1.5 Tablets by mouth two (2) times a day. 8/16/21   Sara Oneal MD   rosuvastatin (CRESTOR) 20 mg tablet Take 1 Tablet by mouth nightly. 8/16/21   Sara Oneal MD   lisinopril-hydroCHLOROthiazide (PRINZIDE, ZESTORETIC) 20-12.5 mg per tablet Take 2 Tablets by mouth daily. Patient taking differently: Take 1 Tablet by mouth daily. 8/16/21   Sara Oneal MD   insulin glargine U-300 conc (TOUJEO MAX U-300 SOLOSTAR) 300 unit/mL (3 mL) inpn 220 Units by SubCUTAneous route nightly. Provider, Historical   acetaminophen (TYLENOL) 500 mg tablet Take 1,000 mg by mouth every six (6) hours as needed for Pain. Provider, Historical   omeprazole (PRILOSEC) 20 mg capsule Take 20 mg by mouth daily. Provider, Historical   Azelastine (ASTEPRO) 0.15 % (205.5 mcg) nasal spray 1 Spray daily. Provider, Historical   montelukast (SINGULAIR) 10 mg tablet Take 10 mg by mouth every evening. Provider, Historical   dapagliflozin (FARXIGA) 10 mg tab Take 10 mg by mouth daily. Provider, Historical   cetirizine (ZYRTEC) 10 mg tablet Take 10 mg by mouth daily as needed for Allergies. Provider, Historical   aspirin delayed-release 81 mg tablet Take 81 mg by mouth daily.     Provider, Historical           ANTHROPOMETRICS:    Ht Readings from Last 1 Encounters:   07/12/22 5' 5\" (1.651 m)      Wt Readings from Last 10 Encounters:   08/10/22 117.1 kg (258 lb 3.2 oz)   08/09/22 117.2 kg (258 lb 6.4 oz)   08/03/22 116.9 kg (257 lb 12.8 oz)   08/01/22 118 kg (260 lb 3.2 oz)   07/27/22 117.5 kg (259 lb)   07/25/22 118.2 kg (260 lb 9.6 oz)   07/21/22 118.3 kg (260 lb 12.8 oz)   07/18/22 118.9 kg (262 lb 3.2 oz)   07/15/22 117 kg (258 lb)   07/13/22 117.7 kg (259 lb 6.4 oz)      IBW:136 # +/- 10%  %IBW: 190 % +/- 10%    BMI: 42.97 kg/M2 Category: severely obese  Waist: 52 inches (measured at intake)    Reported Wt Hx: 265 lbs is his heaviest weight; reports weight got down to 200-205 after MI & CABG in 2015 but gained it back after diet relapse and 2 years ago retired and gained 20 pounds due to decreased activity    Reported Diet Hx:    Rate Your Plate Score: 53  (Score 58-72: Making many healthy choices; 41-57: Some choices need improving 24-40: many choices need improving)    24 HOUR DIET RECALL  Breakfast English muffin w/ egg & sausage and soft canola oil butter spread; cup of tea   Lunch Leftover half of the [employer] burrito (white rice, carnitas, fresh salsa, lettuce, sour cream, cheese); water  (today made a ham sandwich with lite pugh)   Dinner  salad [facility]) w/ Ranch dressing; water (dinner tonight is veggie spaghetti with meatballs; will add veggies to the sauce)   Snacks Afternoon: 4 Oreo cookies, 1 handful potato chips, glass of milk (16 oz 1%)   Beverages      Sharee Santana has no know food allergies, several dislikes. Grew up \"meat & potatoes\" and finds it difficult to add new foods / flavors to his diet. Reports has been paying more attention to portion control and better snack choices for the past few months, a year ago eliminated soda other than to treat a low. Rarely consumes alcohol. Restaurant meals have been frequent, kitchen had been under renovation until 3 months ago - now cooking more at home again. Reports when does eat restaurant food never finishes the portion. Read labels years ago but not now though has not changed diet much. Environmental/Social: his daughter Lynnette Oconnor, who does most of the cooking & shopping, is present with him today; his wife also has diabetes; exercising at cardiac rehab 3 days a week; retired but plans to go back to work at a location near home        [address]:  Nutrition 60 minute one-on-one education & goal setting with [facility] Sheng with Sharee Santana relevant labs compared to ideals.     Reviewed weight history and patient's verbalized weight goal as well as any real or perceived barriers to obtaining the goal. Collaborated with patient to set a specific short and long term weight goal.     Reviewed Rate Your Plate and conducted a verbal diet recall. Assessed for environmental, financial, psychosocial, physical and comorbidities that may impact the food and eating patterns / behaviors of Nina Gilmore with patient to set specific nutrient goals as well as specific food / behavior changes that will help patient meet the overall goal of following a heart healthy eating pattern (using guidelines as set forth by the American Heart Association and modeled after healthful eating patterns as recognized by the USDA Dietary Guidelines such as DASH, Mediterranean or plant-based). Briefly reviewed with David Santana the nutrition information in the Cardiac Rehab patient education book and encouraged David Santana to read thoroughly, ask questions as needed, and use for future reference for heart healthy nutrition information. Tayler Peña is encouraged to participate in Cardiac Rehab group nutrition classes. PATIENT GOALS:    Weight Goals:  Short Term Weight Goal:<250 lbs  Long Term Weight Goal:205 lbs    Nutrition Goals:  Daily Recommendations:  Calories: 1900 /day  (using Luis Angel Calender with AF 1.3 - 500 for weight loss)    Saturated Fat: no more than 12.5 g/day  Trans Fat: 0 g/day  Sodium: no more than 9365-5592 mg/day  Fruit: 2-2.5 cups / day  Vegetables: 2.5 or more cups/day    Other:  - read and compare food labels  - use the plate method at least 8 out of 10 meals  - continue to limit restaurant meals to 0-2 per week (plan and pack as needed)      Keeping a food diary was recommended. Questions addressed. Follow-up plans discussed. David Santana verbalized understanding.             Tavon Arroyo RD

## 2022-08-17 ENCOUNTER — APPOINTMENT (OUTPATIENT)
Dept: CARDIAC REHAB | Age: 72
End: 2022-08-17
Payer: COMMERCIAL

## 2022-08-19 ENCOUNTER — HOSPITAL ENCOUNTER (OUTPATIENT)
Dept: CARDIAC REHAB | Age: 72
Discharge: HOME | End: 2022-08-19
Payer: COMMERCIAL

## 2022-08-19 VITALS — BODY MASS INDEX: 43.1 KG/M2 | WEIGHT: 259 LBS

## 2022-08-19 PROCEDURE — 93798 PHYS/QHP OP CAR RHAB W/ECG: CPT

## 2022-08-22 ENCOUNTER — HOSPITAL ENCOUNTER (OUTPATIENT)
Dept: CARDIAC REHAB | Age: 72
Discharge: HOME | End: 2022-08-22
Payer: COMMERCIAL

## 2022-08-22 DIAGNOSIS — I25.10 CORONARY ARTERY DISEASE INVOLVING NATIVE CORONARY ARTERY OF NATIVE HEART WITHOUT ANGINA PECTORIS: Chronic | ICD-10-CM

## 2022-08-22 DIAGNOSIS — I10 ESSENTIAL HYPERTENSION: ICD-10-CM

## 2022-08-22 DIAGNOSIS — Z79.4 TYPE 2 DIABETES MELLITUS WITHOUT COMPLICATION, WITH LONG-TERM CURRENT USE OF INSULIN (HCC): Chronic | ICD-10-CM

## 2022-08-22 DIAGNOSIS — E11.9 TYPE 2 DIABETES MELLITUS WITHOUT COMPLICATION, WITH LONG-TERM CURRENT USE OF INSULIN (HCC): Chronic | ICD-10-CM

## 2022-08-22 DIAGNOSIS — E78.5 DYSLIPIDEMIA: ICD-10-CM

## 2022-08-22 PROCEDURE — 93798 PHYS/QHP OP CAR RHAB W/ECG: CPT

## 2022-08-23 RX ORDER — EPLERENONE 25 MG/1
25 TABLET, FILM COATED ORAL DAILY
Qty: 90 TABLET | Refills: 3 | Status: SHIPPED | OUTPATIENT
Start: 2022-08-23 | End: 2022-10-28 | Stop reason: SDUPTHER

## 2022-08-23 RX ORDER — DILTIAZEM HYDROCHLORIDE 240 MG/1
240 CAPSULE, COATED, EXTENDED RELEASE ORAL DAILY
Qty: 90 CAPSULE | Refills: 3 | Status: SHIPPED | OUTPATIENT
Start: 2022-08-23

## 2022-08-23 NOTE — TELEPHONE ENCOUNTER
Refill per VO of Dr. Neelima oGnzales  Last appt: 7/12/2022  Future Appointments   Date Time Provider Marcus Varela   8/24/2022  9:00  Noe Fletcher   8/24/2022 10:00 AM [facility] CARDIOPULM EXERCISE [facility] ANDREAS   8/26/2022 10:00 AM [facility] CARDIOPULM EXERCISE [facility] Conine Juhi   1/18/2023  9:00 AM MD ANNA Mendoza BS AMB       Requested Prescriptions     Signed Prescriptions Disp Refills    dilTIAZem ER (Cartia XT) 240 mg capsule 90 Capsule 3     Sig: Take 1 Capsule by mouth daily. Authorizing Provider: Artemio Michael     Ordering User: Joslyn Staton    eplerenone (INSPRA) 25 mg tablet 90 Tablet 3     Sig: Take 1 Tablet by mouth daily.      Authorizing Provider: Artemio Michael     Ordering User: Joslyn Staton

## 2022-08-24 ENCOUNTER — APPOINTMENT (OUTPATIENT)
Dept: CARDIAC REHAB | Age: 72
End: 2022-08-24
Payer: COMMERCIAL

## 2022-08-24 ENCOUNTER — HOSPITAL ENCOUNTER (OUTPATIENT)
Dept: CARDIAC REHAB | Age: 72
Discharge: HOME | End: 2022-08-24
Payer: COMMERCIAL

## 2022-08-24 VITALS — WEIGHT: 257.9 LBS | BODY MASS INDEX: 42.92 KG/M2

## 2022-08-24 VITALS — BODY MASS INDEX: 43.4 KG/M2 | WEIGHT: 260.8 LBS

## 2022-08-24 PROCEDURE — 93798 PHYS/QHP OP CAR RHAB W/ECG: CPT

## 2022-08-26 ENCOUNTER — HOSPITAL ENCOUNTER (OUTPATIENT)
Dept: CARDIAC REHAB | Age: 72
Discharge: HOME | End: 2022-08-26
Payer: COMMERCIAL

## 2022-08-26 VITALS — BODY MASS INDEX: 43.07 KG/M2 | WEIGHT: 258.8 LBS

## 2022-08-26 PROCEDURE — 93798 PHYS/QHP OP CAR RHAB W/ECG: CPT

## 2022-08-29 ENCOUNTER — HOSPITAL ENCOUNTER (OUTPATIENT)
Dept: CARDIAC REHAB | Age: 72
Discharge: HOME | End: 2022-08-29
Payer: COMMERCIAL

## 2022-08-29 VITALS — WEIGHT: 256.6 LBS | BODY MASS INDEX: 42.7 KG/M2

## 2022-08-29 PROCEDURE — 93798 PHYS/QHP OP CAR RHAB W/ECG: CPT

## 2022-08-31 ENCOUNTER — HOSPITAL ENCOUNTER (OUTPATIENT)
Dept: CARDIAC REHAB | Age: 72
Discharge: HOME | End: 2022-08-31
Payer: COMMERCIAL

## 2022-08-31 VITALS — WEIGHT: 260.6 LBS | BODY MASS INDEX: 43.37 KG/M2

## 2022-08-31 PROCEDURE — 93797 PHYS/QHP OP CAR RHAB WO ECG: CPT

## 2022-08-31 PROCEDURE — 93798 PHYS/QHP OP CAR RHAB W/ECG: CPT

## 2022-09-01 ENCOUNTER — APPOINTMENT (OUTPATIENT)
Dept: CARDIAC REHAB | Age: 72
End: 2022-09-01
Payer: MEDICARE

## 2022-09-07 ENCOUNTER — HOSPITAL ENCOUNTER (OUTPATIENT)
Dept: CARDIAC REHAB | Age: 72
Discharge: HOME | End: 2022-09-07
Payer: MEDICARE

## 2022-09-07 VITALS — BODY MASS INDEX: 43.25 KG/M2 | WEIGHT: 259.9 LBS

## 2022-09-07 PROCEDURE — 93798 PHYS/QHP OP CAR RHAB W/ECG: CPT

## 2022-09-09 ENCOUNTER — HOSPITAL ENCOUNTER (OUTPATIENT)
Dept: CARDIAC REHAB | Age: 72
Discharge: HOME | End: 2022-09-09
Payer: MEDICARE

## 2022-09-09 VITALS — WEIGHT: 258.6 LBS | BODY MASS INDEX: 43.03 KG/M2

## 2022-09-09 PROCEDURE — 93798 PHYS/QHP OP CAR RHAB W/ECG: CPT

## 2022-09-13 ENCOUNTER — HOSPITAL ENCOUNTER (OUTPATIENT)
Dept: CARDIAC REHAB | Age: 72
Discharge: HOME | End: 2022-09-13
Payer: MEDICARE

## 2022-09-13 VITALS — WEIGHT: 261.1 LBS | BODY MASS INDEX: 43.45 KG/M2

## 2022-09-13 PROCEDURE — 93798 PHYS/QHP OP CAR RHAB W/ECG: CPT

## 2022-09-15 ENCOUNTER — HOSPITAL ENCOUNTER (OUTPATIENT)
Dept: CARDIAC REHAB | Age: 72
Discharge: HOME | End: 2022-09-15
Payer: MEDICARE

## 2022-09-15 VITALS — BODY MASS INDEX: 43.3 KG/M2 | WEIGHT: 260.2 LBS

## 2022-09-15 PROCEDURE — 93798 PHYS/QHP OP CAR RHAB W/ECG: CPT

## 2022-09-19 NOTE — TELEPHONE ENCOUNTER
Patient is calling to request a refill for Carvedilol 25mg. Please Advise.      Pharmacy Verified     [facility]     [phone]

## 2022-09-20 ENCOUNTER — HOSPITAL ENCOUNTER (OUTPATIENT)
Dept: CARDIAC REHAB | Age: 72
Discharge: HOME | End: 2022-09-20
Payer: MEDICARE

## 2022-09-20 VITALS — BODY MASS INDEX: 43.1 KG/M2 | WEIGHT: 259 LBS

## 2022-09-20 PROCEDURE — 93798 PHYS/QHP OP CAR RHAB W/ECG: CPT

## 2022-09-22 ENCOUNTER — HOSPITAL ENCOUNTER (OUTPATIENT)
Dept: CARDIAC REHAB | Age: 72
Discharge: HOME | End: 2022-09-22
Payer: MEDICARE

## 2022-09-22 VITALS — WEIGHT: 252.2 LBS | BODY MASS INDEX: 41.97 KG/M2

## 2022-09-22 PROCEDURE — 93798 PHYS/QHP OP CAR RHAB W/ECG: CPT

## 2022-09-22 PROCEDURE — 93797 PHYS/QHP OP CAR RHAB WO ECG: CPT

## 2022-09-22 RX ORDER — CARVEDILOL 25 MG/1
37.5 TABLET ORAL 2 TIMES DAILY
Qty: 270 TABLET | Refills: 3 | Status: SHIPPED | OUTPATIENT
Start: 2022-09-22

## 2022-09-22 NOTE — TELEPHONE ENCOUNTER
Refill per VO of Dr. Kristan Olivares  Last appt: Visit date not found  Future Appointments   Date Time Provider Marcus Torresi   9/26/2022 10:00 AM [facility] [address] [facility]   9/28/2022  9:00 AM [facility] Rose Fuentes   9/28/2022 10:00 AM [facility] [address] [facility]   10/4/2022 10:00 AM [facility] [id number] [place] [address]   10/6/2022 10:00 AM [facility] CARDIOPULM EXERCISE [facility]   1/18/2023  9:00 AM MD ANNA Aquino BS AMB       Requested Prescriptions     Signed Prescriptions Disp Refills    carvediloL (COREG) 25 mg tablet 270 Tablet 3     Sig: Take 1.5 Tablets by mouth two (2) times a day.      Authorizing Provider: Terrence Mojica     Ordering User: Faith Sotelo

## 2022-09-26 ENCOUNTER — HOSPITAL ENCOUNTER (OUTPATIENT)
Dept: CARDIAC REHAB | Age: 72
Discharge: HOME | End: 2022-09-26
Payer: MEDICARE

## 2022-09-26 VITALS — BODY MASS INDEX: 43.8 KG/M2 | WEIGHT: 263.2 LBS

## 2022-09-26 PROCEDURE — 93798 PHYS/QHP OP CAR RHAB W/ECG: CPT

## 2022-09-28 ENCOUNTER — HOSPITAL ENCOUNTER (OUTPATIENT)
Dept: CARDIAC REHAB | Age: 72
Discharge: HOME | End: 2022-09-28
Payer: MEDICARE

## 2022-09-28 VITALS — BODY MASS INDEX: 43.73 KG/M2 | WEIGHT: 262.8 LBS

## 2022-09-28 PROCEDURE — 93797 PHYS/QHP OP CAR RHAB WO ECG: CPT

## 2022-09-28 PROCEDURE — 93798 PHYS/QHP OP CAR RHAB W/ECG: CPT

## 2022-10-04 ENCOUNTER — HOSPITAL ENCOUNTER (OUTPATIENT)
Dept: CARDIAC REHAB | Age: 72
Discharge: HOME | End: 2022-10-04
Payer: MEDICARE

## 2022-10-04 VITALS — WEIGHT: 259.6 LBS | BODY MASS INDEX: 43.2 KG/M2

## 2022-10-04 PROCEDURE — 93798 PHYS/QHP OP CAR RHAB W/ECG: CPT

## 2022-10-06 ENCOUNTER — HOSPITAL ENCOUNTER (OUTPATIENT)
Dept: CARDIAC REHAB | Age: 72
Discharge: HOME | End: 2022-10-06
Payer: MEDICARE

## 2022-10-06 VITALS — BODY MASS INDEX: 43.9 KG/M2 | WEIGHT: 263.8 LBS

## 2022-10-06 PROCEDURE — 93798 PHYS/QHP OP CAR RHAB W/ECG: CPT

## 2022-10-07 NOTE — CARDIO/PULMONARY
[facility] Cardiopulmonary Rehab Program Completion:  Dharmesh Staples YOB: 1950 completed phase II cardiac rehab. He attended 36 sessions from 22 to 10/6/22. Mr Santana is interested in maintaining optimal health and will continue to work with Dr Medhat Johnson and his PCP. Patient has improved his strength and stamina through regular exercise. Blood pressure readings were consistently within normal limits. Pt exercised up to 45 minutes at peak METS 4.8. Patient has improved his DASI, [employer] & depression scores. His nutrition score did not show significant improvement. Pt continues to struggle with making healthy food choices & portion control. Eating out frequently, while his kitchen was being renovated, also negatively impact pt's wt loss efforts. Pt monitors his blood glucose frequently and is committed to improving diabetes control (HgbA1c 8.0). Mr Santana plans to continue exercising at home, and has set revised goals that include walking 3 to 5 times a week for at least 15 minutes. ROQUE has been a limiting factor with walking. Pt needs to continue working on weight loss. He was encouraged to f/u with his PCP to obtain recent lipid panel and Hgb A1c results.

## 2022-10-28 DIAGNOSIS — I10 ESSENTIAL HYPERTENSION: ICD-10-CM

## 2022-10-28 DIAGNOSIS — Z79.4 TYPE 2 DIABETES MELLITUS WITHOUT COMPLICATION, WITH LONG-TERM CURRENT USE OF INSULIN (HCC): Chronic | ICD-10-CM

## 2022-10-28 DIAGNOSIS — E11.9 TYPE 2 DIABETES MELLITUS WITHOUT COMPLICATION, WITH LONG-TERM CURRENT USE OF INSULIN (HCC): Chronic | ICD-10-CM

## 2022-10-28 RX ORDER — EPLERENONE 25 MG/1
25 TABLET, FILM COATED ORAL DAILY
Qty: 90 TABLET | Refills: 3 | Status: SHIPPED | OUTPATIENT
Start: 2022-10-28

## 2022-10-28 NOTE — TELEPHONE ENCOUNTER
Refill per VO of Dr. Lovell Marker  Last appt: 7/12/2022  Future Appointments   Date Time Provider Marcus Varela   1/18/2023  9:00 AM Dimple Patient, MD CASTILLO BS AMB       Requested Prescriptions     Signed Prescriptions Disp Refills    eplerenone (INSPRA) 25 mg tablet 90 Tablet 3     Sig: Take 1 Tablet by mouth daily.      Authorizing Provider: Jamey Orlando     Ordering User: Colby Guido

## 2023-01-25 ENCOUNTER — OFFICE VISIT (OUTPATIENT)
Dept: CARDIOLOGY CLINIC | Age: 73
End: 2023-01-25
Payer: MEDICARE

## 2023-01-25 VITALS
OXYGEN SATURATION: 96 % | WEIGHT: 263 LBS | HEIGHT: 65 IN | DIASTOLIC BLOOD PRESSURE: 72 MMHG | HEART RATE: 64 BPM | SYSTOLIC BLOOD PRESSURE: 130 MMHG | BODY MASS INDEX: 43.82 KG/M2

## 2023-01-25 DIAGNOSIS — E78.5 DYSLIPIDEMIA: ICD-10-CM

## 2023-01-25 DIAGNOSIS — I25.118 CORONARY ARTERY DISEASE OF NATIVE ARTERY OF NATIVE HEART WITH STABLE ANGINA PECTORIS (HCC): ICD-10-CM

## 2023-01-25 DIAGNOSIS — I10 PRIMARY HYPERTENSION: ICD-10-CM

## 2023-01-25 DIAGNOSIS — I25.118 CORONARY ARTERY DISEASE OF NATIVE ARTERY OF NATIVE HEART WITH STABLE ANGINA PECTORIS (HCC): Primary | ICD-10-CM

## 2023-01-25 PROCEDURE — 3017F COLORECTAL CA SCREEN DOC REV: CPT | Performed by: SPECIALIST

## 2023-01-25 PROCEDURE — G8417 CALC BMI ABV UP PARAM F/U: HCPCS | Performed by: SPECIALIST

## 2023-01-25 PROCEDURE — G8427 DOCREV CUR MEDS BY ELIG CLIN: HCPCS | Performed by: SPECIALIST

## 2023-01-25 PROCEDURE — 99214 OFFICE O/P EST MOD 30 MIN: CPT | Performed by: SPECIALIST

## 2023-01-25 PROCEDURE — 1101F PT FALLS ASSESS-DOCD LE1/YR: CPT | Performed by: SPECIALIST

## 2023-01-25 PROCEDURE — G8510 SCR DEP NEG, NO PLAN REQD: HCPCS | Performed by: SPECIALIST

## 2023-01-25 PROCEDURE — G8536 NO DOC ELDER MAL SCRN: HCPCS | Performed by: SPECIALIST

## 2023-01-25 PROCEDURE — 3078F DIAST BP <80 MM HG: CPT | Performed by: SPECIALIST

## 2023-01-25 PROCEDURE — 3075F SYST BP GE 130 - 139MM HG: CPT | Performed by: SPECIALIST

## 2023-01-25 PROCEDURE — 1123F ACP DISCUSS/DSCN MKR DOCD: CPT | Performed by: SPECIALIST

## 2023-01-25 NOTE — PROGRESS NOTES
Alexandro Yao MD. [facility] [place]              Patient: Arcadio Santana  : 1950      Today's Date: 2023          HISTORY OF PRESENT ILLNESS:     History of Present Illness:    Doing OK - had COVID in Dec.  Has chronic ROQUE class 2. No CP. Plans to exercise soon. PAST MEDICAL HISTORY:     Past Medical History:   Diagnosis Date    CAD (coronary artery disease)     NSTEMI 3/21/15; CABG 3/24/15;  LVEF 55%    Diabetes (HCC)     Dyslipidemia     FH ischemic heart disease     GERD (gastroesophageal reflux disease)     HTN (hypertension)     Hypertension     NSTEMI (non-ST elevated myocardial infarction) ([facility] [address]     NSTEMI 3/21/15    Obesity     MING (obstructive sleep apnea)     surgery in past    MING (obstructive sleep apnea)     on CPAP    Paronychia of great toe of right foot     S/P CABG x 4     CABG 3/24/15 - LIMA TO LAD, SEQUENTIAL VEIN GRAFT TO OM1, OM2; SVG TO DISTAL RCA         Past Surgical History:   Procedure Laterality Date    HX APPENDECTOMY      HX HEENT      eyes    HX ORTHOPAEDIC      L foot 4th toe amputation (diabetes)    [place] UNLISTED PROCEDURE CARDIAC SURGERY      CABG 3/24/2015           MEDICATIONS:     Current Outpatient Medications   Medication Sig Dispense Refill    eplerenone (INSPRA) 25 mg tablet Take 1 Tablet by mouth daily. 90 Tablet 3    carvediloL (COREG) 25 mg tablet Take 1.5 Tablets by mouth two (2) times a day. 270 Tablet 3    dilTIAZem ER (Cartia XT) 240 mg capsule Take 1 Capsule by mouth daily. 90 Capsule 3    insulin lispro (HUMALOG) 100 unit/mL injection by SubCUTAneous route three (3) times daily as needed (sliging scale pre meals). Indications: type 2 diabetes mellitus      sitagliptin phosphate (JANUVIA PO) Take 100 mg by mouth daily. rosuvastatin (CRESTOR) 20 mg tablet Take 1 Tablet by mouth nightly. 90 Tablet 3    lisinopril-hydroCHLOROthiazide (PRINZIDE, ZESTORETIC) 20-12.5 mg per tablet Take 2 Tablets by mouth daily.  (Patient taking differently: Take 1 Tablet by mouth daily. ) 180 Tablet 3    insulin glargine U-300 conc 300 unit/mL (3 mL) inpn 220 Units by SubCUTAneous route nightly. acetaminophen (TYLENOL) 500 mg tablet Take 1,000 mg by mouth every six (6) hours as needed for Pain. omeprazole (PRILOSEC) 20 mg capsule Take 20 mg by mouth daily. Azelastine (ASTEPRO) 0.15 % (205.5 mcg) nasal spray 1 Spray daily. montelukast (SINGULAIR) 10 mg tablet Take 10 mg by mouth every evening. dapagliflozin (FARXIGA) 10 mg tab tablet Take 10 mg by mouth daily. cetirizine (ZYRTEC) 10 mg tablet Take 10 mg by mouth daily as needed for Allergies. aspirin delayed-release 81 mg tablet Take 81 mg by mouth daily. Allergies   Allergen Reactions    Amoxicillin Hives    Metformin Diarrhea    Ozempic [Semaglutide] Other (comments)     GI problem            SOCIAL HISTORY:     Social History     Tobacco Use    Smoking status: Former     Types: Pipe     Quit date:      Years since quittin.0    Smokeless tobacco: Never   Substance Use Topics    Alcohol use: Yes     Alcohol/week: 0.0 standard drinks     Comment: rarely    Drug use: No         FAMILY HISTORY:     Family History   Problem Relation Age of Onset    Coronary Art Dis Mother     Coronary Art Dis Father     Diabetes Brother     Hypertension Brother     Cancer Sister            REVIEW OF SYMPTOMS:     Review of Symptoms:  Constitutional: Negative for fever, chills  HEENT: Negative for nosebleeds, tinnitus, and vision changes. Respiratory: Negative for cough, wheezing  Cardiovascular: Negative for orthopnea, claudication, syncope, and PND. Gastrointestinal: Negative for abdominal pain, diarrhea, melena. Genitourinary: Negative for dysuria  Musculoskeletal: Negative for myalgias. Skin: Negative for rash  Heme: No problems bleeding. Neurological: Negative for speech change and focal weakness.          PHYSICAL EXAM:     Physical Exam:  Visit Vitals  /72   Pulse 64   Ht 5' 5\" (1.651 m)   Wt 263 lb (119.3 kg)   SpO2 96%   BMI 43.77 kg/m²     Patient appears generally well, mood and affect are appropriate and pleasant. HEENT:  Hearing intact, non-icteric, normocephalic, atraumatic. Neck Exam: Supple, Hard to assess JVD  Lung Exam: Clear to auscultation, even breath sounds. Cardiac Exam: Regular rate and rhythm with no murmur or rub  Abdomen: Soft, non-tender, normal bowel sounds. Obese   Extremities: Moves all ext well. Trace lower extremity edema. Psych: Appropriate affect  Neuro - Grossly intact        LABS / OTHER STUDIES:     Lab Results   Component Value Date/Time    Sodium 137 06/13/2022 11:50 AM    Potassium 4.8 06/13/2022 11:50 AM    Chloride 103 06/13/2022 11:50 AM    CO2 23 06/13/2022 11:50 AM    Anion gap 7 07/08/2019 02:04 AM    Glucose 153 (H) 06/13/2022 11:50 AM    BUN 26 06/13/2022 11:50 AM    Creatinine 1.48 (H) 06/13/2022 11:50 AM    BUN/Creatinine ratio 18 06/13/2022 11:50 AM    GFR est AA >60 07/08/2019 02:04 AM    GFR est non-AA >60 07/08/2019 02:04 AM    Calcium 9.2 06/13/2022 11:50 AM    Bilirubin, total 0.5 06/13/2022 11:50 AM    Alk.  phosphatase 82 06/13/2022 11:50 AM    Protein, total 6.4 06/13/2022 11:50 AM    Albumin 4.0 06/13/2022 11:50 AM    Globulin 3.9 07/08/2019 02:04 AM    A-G Ratio 1.7 06/13/2022 11:50 AM    ALT (SGPT) 37 06/13/2022 11:50 AM    AST (SGOT) 33 06/13/2022 11:50 AM     Lab Results   Component Value Date/Time    WBC 7.4 06/13/2022 11:50 AM    Hemoglobin (POC) 15.6 03/21/2015 09:39 AM    HGB 14.3 06/13/2022 11:50 AM    Hematocrit (POC) 46 03/21/2015 09:39 AM    HCT 43.7 06/13/2022 11:50 AM    PLATELET 300 00/15/4921 11:50 AM    MCV 86 06/13/2022 11:50 AM       Lab Results   Component Value Date/Time    Cholesterol, total 96 (L) 06/13/2022 11:50 AM    HDL Cholesterol 24 (L) 06/13/2022 11:50 AM    LDL, calculated 36 06/13/2022 11:50 AM    LDL, calculated 41 03/22/2015 02:45 AM    VLDL, calculated 36 06/13/2022 11:50 AM    VLDL, calculated 54 03/22/2015 02:45 AM    Triglyceride 230 (H) 06/13/2022 11:50 AM    CHOL/HDL Ratio 4.7 03/22/2015 02:45 AM       Lab Results   Component Value Date/Time    TSH 2.380 06/13/2022 11:50 AM       Lab Results   Component Value Date/Time    Hemoglobin A1c 8.5 (H) 07/04/2019 03:09 PM    Hemoglobin A1c, External 8.0 07/14/2022 12:00 AM          CARDIAC DIAGNOSTICS:      Cardiac Evaluation Includes:        ECHO: 3/21/2015: EF 55-60%, no WMA, mild LVH, MAC, no MR    CATH: 3/23/2015: Obstructive 3VD:dLM:80%, mLAD:80%, dLAD:70%, D1:90% mLCX: 90%, mRCA:50%, PDA: 80%, EF 55%, Focal mid inferior AK. Intra-op WILEY 3/24/15 - postop LVEF 55%, mild MR  CABG 3/24/15 - LIMA TO LAD, SEQUENTIAL VEIN GRAFT TO OM1, OM2; SVG TO DISTAL RCA    Lexiscan Cardiolite 6/16/22 - Attempted to walk paitent on treadmill reaching a max HR of 97, mets 5.7 stopping secondary to hip pain. Protocol switched to [facility]. No CP. Stress ECG: Ischemic ST changes with 1 mm downsloping ST depression in the inferior leads after Lexiscan injection. Perfusion Comments: LV perfusion is abnormal.  There is a moderate sized, moderate grade, partially reversible defect with minimal reversibility. Imaging improves just minimally with prone imaging. SSS = 11, SRS = 6, SDS = 4. Findings consistent with a small to moderate sized inferior wall infarct with mild reversibility. A small degree of diaphragmatic attenuation also likely present. LVEF 63%. Echo 6/17/22 - TDS. LVEF 58%. Grade 2 diastology. Mild LAE. Cardiac cath 6/28/22 -   Severe diffuse native vessel disease. Patent LIMA and SVG to OM1/2.  of distal RCA. Elevated lvedp.   ---> Plan:  Distal RCA not amendable to PCI. Distal vessel in 2015 is small caliber, calcified with severe diffuse disease thoruhgout PDA and PL-branch. Limited benefit in  procedure. Recommend medical management of CAD.         EKG 3/21/15 - NSR, diffuse ST depression   EKG 5/16/16 - NSR, NSST changes   EKG 5/16/16 - NSR, NSST changes, PVC's   EKG 5/8/19 - NSR, non-specific T wave abn   EKG 7/2/20 - NSR, non-specific T wave abn   EKG 8/16/21 - NSR, lateral TWI (similar to prior EKG)   EKG 7/22 - NSR, NSST changes          ASSESSMENT AND PLAN:         Assessment and Plan:  1) CAD  - NSTEMI 3/21/15; CABG 3/24/15; LVEF 55%  - Cardiac cath 6/28/22 -   Severe diffuse native vessel disease. Patent LIMA and SVG to OM1/2.  of distal RCA. Elevated lvedp.   ---> Plan:  Distal RCA not amendable to PCI. Distal vessel in 2015 is small caliber, calcified with severe diffuse disease thoruhgout PDA and PL-branch. Limited benefit in  procedure. Recommend medical management of CAD. - still with class 2 ROQUE.    - He completed cardiac rehab. - Continue BB, statin, ACE-I, and ASA  - I encouraged he exercise and eat healthy -- plans to work out at [employer]    2) HTN    - on multiple meds for his BP  - BP looks OK - continue meds       3) Dyslipidemia    - continue Crestor - prior LDL was < 40  - labs followed by Endocrine (Dr. Damon Hickman)       4) Diabetes Mellitus  - working with PCP       5) See me in 12 months. 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 Has 2 young grand kids. Renovating Kitchen and bathrooms. Hannah Banegas MD, [address], [address]                [place] [address]  [address]  [place], [address] [place] [address]  Ph: [phone]                                                             Ph [phone]

## 2023-01-25 NOTE — PROGRESS NOTES
Chief Complaint   Patient presents with    Follow-up     6month    Coronary Artery Disease    Hypertension    Cholesterol Problem       Vitals:    01/25/23 0927   BP: 130/72   Pulse: 64   Height: 5' 5\" (1.651 m)   Weight: 263 lb (119.3 kg)   SpO2: 96%         Chest pain: no    SOB: no    Palpitations: no    Dizziness: no    Swelling: no    Refills:       Have you been to the ER, urgent care clinic since your last visit? Hospitalized since your last visit? no    Have you seen or consulted other health care providers outside of the [facility] since your last visit?  (Include any pap smears or colon screening.)

## 2023-02-04 DIAGNOSIS — E78.5 DYSLIPIDEMIA: ICD-10-CM

## 2023-02-04 DIAGNOSIS — I25.118 CORONARY ARTERY DISEASE OF NATIVE ARTERY OF NATIVE HEART WITH STABLE ANGINA PECTORIS (HCC): ICD-10-CM

## 2023-03-21 RX ORDER — LISINOPRIL AND HYDROCHLOROTHIAZIDE 12.5; 2 MG/1; MG/1
1 TABLET ORAL DAILY
Qty: 90 TABLET | Refills: 3 | Status: SHIPPED | OUTPATIENT
Start: 2023-03-21

## 2023-03-21 NOTE — TELEPHONE ENCOUNTER
Refill per VO of Dr. Adán Arizmendi  Last appt: 6/17/2022  Future Appointments   Date Time Provider Marcus Varela   2/7/2024  9:40 AM MD ANNA Cordero AMB       Requested Prescriptions     Pending Prescriptions Disp Refills    lisinopril-hydroCHLOROthiazide (PRINZIDE, ZESTORETIC) 20-12.5 mg per tablet 90 Tablet 3     Sig: Take 1 Tablet by mouth daily.

## 2023-08-16 NOTE — TELEPHONE ENCOUNTER
Refill per VO of Dr. Abimael Rodriguez  Last appt: 1/25/2023    Future Appointments   Date Time Provider [address] [place] [address]   2/7/2024  9:40 AM MD LAURA Mohr AMB       Requested Prescriptions     Pending Prescriptions Disp Refills    dilTIAZem (TIAZAC) 240 MG extended release capsule 90 capsule 3     Sig: Take 1 capsule by mouth daily

## 2023-08-17 RX ORDER — DILTIAZEM HYDROCHLORIDE 240 MG/1
240 CAPSULE, EXTENDED RELEASE ORAL DAILY
Qty: 90 CAPSULE | Refills: 3 | Status: SHIPPED | OUTPATIENT
Start: 2023-08-17

## 2023-11-29 RX ORDER — EPLERENONE 25 MG/1
25 TABLET, FILM COATED ORAL DAILY
Qty: 90 TABLET | Refills: 0 | Status: SHIPPED | OUTPATIENT
Start: 2023-11-29

## 2023-11-29 NOTE — TELEPHONE ENCOUNTER
Refill per VO of Dr. Forrest Watts  Last appt: 1/25/2023    Future Appointments   Date Time Provider [address]   2/7/2024  9:40 AM MD LAURA Conrad AMB       Requested Prescriptions      No prescriptions requested or ordered in this encounter   Medication sent to [facility] home delivery pharmacy per faxed request.

## 2023-12-04 ENCOUNTER — TELEPHONE (OUTPATIENT)
Age: 73
End: 2023-12-04

## 2023-12-04 RX ORDER — CARVEDILOL 25 MG/1
37.5 TABLET ORAL 2 TIMES DAILY
Qty: 60 TABLET | Refills: 1 | Status: SHIPPED | OUTPATIENT
Start: 2023-12-04 | End: 2023-12-07 | Stop reason: SDUPTHER

## 2023-12-04 NOTE — TELEPHONE ENCOUNTER
Pt. Following up on a request that his Mail Pharmacy has sent regarding his Carvedilol. Pt. Is almost out, wants sent to his local Pharmacy. 90 day supply.     [facility] - [phone]

## 2023-12-07 RX ORDER — CARVEDILOL 25 MG/1
37.5 TABLET ORAL 2 TIMES DAILY
Qty: 135 TABLET | Refills: 3 | Status: SHIPPED | OUTPATIENT
Start: 2023-12-07

## 2023-12-07 NOTE — TELEPHONE ENCOUNTER
Refill per VO of Dr. Mirna Myles  Last appt: 1/25/2023    Future Appointments   Date Time Provider [facility] [address]   2/7/2024  9:40 AM MD LAURA Monaco AMB       Requested Prescriptions     Signed Prescriptions Disp Refills    carvedilol (COREG) 25 MG tablet 135 tablet 3     Sig: Take 1.5 tablets by mouth 2 times daily     Authorizing Provider: Troy Stahl     Ordering User: Keerthi Porter

## 2024-02-07 ENCOUNTER — OFFICE VISIT (OUTPATIENT)
Age: 74
End: 2024-02-07
Payer: COMMERCIAL

## 2024-02-07 VITALS
BODY MASS INDEX: 44.15 KG/M2 | SYSTOLIC BLOOD PRESSURE: 128 MMHG | HEIGHT: 65 IN | HEART RATE: 71 BPM | OXYGEN SATURATION: 93 % | WEIGHT: 265 LBS | DIASTOLIC BLOOD PRESSURE: 60 MMHG

## 2024-02-07 DIAGNOSIS — I10 PRIMARY HYPERTENSION: ICD-10-CM

## 2024-02-07 DIAGNOSIS — Z95.1 S/P CABG X 4: ICD-10-CM

## 2024-02-07 DIAGNOSIS — E66.01 MORBID OBESITY (HCC): ICD-10-CM

## 2024-02-07 DIAGNOSIS — I25.118 CORONARY ARTERY DISEASE OF NATIVE ARTERY OF NATIVE HEART WITH STABLE ANGINA PECTORIS (HCC): Primary | ICD-10-CM

## 2024-02-07 DIAGNOSIS — E11.42 DM TYPE 2 WITH DIABETIC PERIPHERAL NEUROPATHY (HCC): ICD-10-CM

## 2024-02-07 PROCEDURE — 99214 OFFICE O/P EST MOD 30 MIN: CPT | Performed by: SPECIALIST

## 2024-02-07 PROCEDURE — 3078F DIAST BP <80 MM HG: CPT | Performed by: SPECIALIST

## 2024-02-07 PROCEDURE — 93000 ELECTROCARDIOGRAM COMPLETE: CPT | Performed by: SPECIALIST

## 2024-02-07 PROCEDURE — 1123F ACP DISCUSS/DSCN MKR DOCD: CPT | Performed by: SPECIALIST

## 2024-02-07 PROCEDURE — 3074F SYST BP LT 130 MM HG: CPT | Performed by: SPECIALIST

## 2024-02-07 RX ORDER — EPLERENONE 25 MG/1
25 TABLET, FILM COATED ORAL DAILY
Qty: 90 TABLET | Refills: 3 | Status: SHIPPED | OUTPATIENT
Start: 2024-02-07

## 2024-02-07 RX ORDER — LISINOPRIL AND HYDROCHLOROTHIAZIDE 20; 12.5 MG/1; MG/1
1 TABLET ORAL DAILY
Qty: 90 TABLET | Refills: 3 | Status: SHIPPED | OUTPATIENT
Start: 2024-02-07

## 2024-02-07 RX ORDER — SEMAGLUTIDE 0.68 MG/ML
0.5 INJECTION, SOLUTION SUBCUTANEOUS
COMMUNITY
Start: 2024-01-18

## 2024-02-07 RX ORDER — CHOLECALCIFEROL (VITAMIN D3) 50 MCG
4000 CAPSULE ORAL DAILY
COMMUNITY

## 2024-02-07 NOTE — PROGRESS NOTES
Chief Complaint   Patient presents with    Annual Exam    Hypertension    Coronary Artery Disease     Vitals:    02/07/24 0948   BP: 128/60   Site: Left Upper Arm   Position: Sitting   Cuff Size: Large Adult   Pulse: 71   SpO2: 93%   Weight: 120.2 kg (265 lb)   Height: 1.651 m (5' 5\")       Chest pain NO     SOB if walks too fast or too far; same as before.    ER, urgent care, or hospitalized outside of [facility] since your last visit?  NO     Refills NO     His wife is coming this afternoon.  
Morbid Obesity   - started Ozempic 9/23        6) See me in 12 months.     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  Has 2 young grand kids.  Renovating Kitchen and bathrooms.            Dong Almanza MD, [facility]  [facility]  [address], [address]  [address]  [place] [id number]  [place] [address]  Ph: [phone]   Ph [phone]

## 2024-05-27 ENCOUNTER — PATIENT MESSAGE (OUTPATIENT)
Age: 74
End: 2024-05-27

## 2024-05-28 NOTE — TELEPHONE ENCOUNTER
From: Donal Howe  To: Dr. Dong Almanza  Sent: 5/27/2024 1:24 PM EDT  Subject: Carvedilol    You used to write this prescription for 90 days. Then you wrote it for 45 days. Now you wrote it for 20 days. Is there a particular reason or was it an error. I don't want to have to  medicine every 20 days, please fix this.

## 2024-05-29 RX ORDER — CARVEDILOL 25 MG/1
37.5 TABLET ORAL 2 TIMES DAILY
Qty: 135 TABLET | Refills: 1 | Status: SHIPPED | OUTPATIENT
Start: 2024-05-29

## 2024-05-29 NOTE — TELEPHONE ENCOUNTER
Refill per VO of Dr. Almanza  Last appt: 2/7/2024    Future Appointments   Date Time Provider Department Center   2/11/2025 10:40 AM Dong Almanza MD [facility] AMB       Requested Prescriptions     Signed Prescriptions Disp Refills    carvedilol (COREG) 25 MG tablet 135 tablet 1     Sig: Take 1.5 tablets by mouth 2 times daily     Authorizing Provider: DONG ALMANZA     Ordering User: MARGE TALBOT

## 2024-10-02 RX ORDER — CARVEDILOL 25 MG/1
TABLET ORAL
Qty: 135 TABLET | Refills: 1 | Status: SHIPPED | OUTPATIENT
Start: 2024-10-02

## 2024-10-14 RX ORDER — DILTIAZEM HYDROCHLORIDE 240 MG/1
240 CAPSULE, EXTENDED RELEASE ORAL DAILY
Qty: 90 CAPSULE | Refills: 3 | Status: SHIPPED | OUTPATIENT
Start: 2024-10-14

## 2024-10-14 NOTE — TELEPHONE ENCOUNTER
Refill per VO of Dr. Almanza  Last appt: 2/7/2024    Future Appointments   Date Time Provider Department Center   2/11/2025 10:40 AM Dong Almanza MD [facility] AM[facility]       Requested Prescriptions     Signed Prescriptions Disp Refills    dilTIAZem (TIAZAC) 240 MG extended release capsule 90 capsule 3     Sig: Take 1 capsule by mouth once daily     Authorizing Provider: DONG ALMANZA     Ordering User: CORINNA BERGMAN

## 2025-01-17 RX ORDER — CARVEDILOL 25 MG/1
TABLET ORAL
Qty: 135 TABLET | Refills: 3 | Status: SHIPPED | OUTPATIENT
Start: 2025-01-17

## 2025-01-17 NOTE — TELEPHONE ENCOUNTER
Refill per VO of Dr. Almanza  Last appt: 2/7/2024    Future Appointments   Date Time Provider Department Center   2/11/2025 10:40 AM Dong Almanza MD [facility]       Requested Prescriptions     Signed Prescriptions Disp Refills    carvedilol (COREG) 25 MG tablet 135 tablet 3     Sig: TAKE 1 & 1/2 (ONE & ONE-HALF) TABLETS BY MOUTH TWICE DAILY     Authorizing Provider: DONG ALMANZA     Ordering User: CORINNA BERGMAN

## 2025-02-06 RX ORDER — LISINOPRIL AND HYDROCHLOROTHIAZIDE 12.5; 2 MG/1; MG/1
1 TABLET ORAL DAILY
Qty: 90 TABLET | Refills: 0 | Status: SHIPPED | OUTPATIENT
Start: 2025-02-06

## 2025-02-10 RX ORDER — EPLERENONE 25 MG/1
25 TABLET, FILM COATED ORAL DAILY
Qty: 90 TABLET | Refills: 1 | Status: SHIPPED | OUTPATIENT
Start: 2025-02-10

## 2025-02-10 RX ORDER — CARVEDILOL 25 MG/1
37.5 TABLET ORAL 2 TIMES DAILY
Qty: 270 TABLET | Refills: 1 | Status: SHIPPED | OUTPATIENT
Start: 2025-02-10

## 2025-02-10 RX ORDER — LISINOPRIL AND HYDROCHLOROTHIAZIDE 12.5; 2 MG/1; MG/1
1 TABLET ORAL DAILY
Qty: 90 TABLET | Refills: 1 | Status: SHIPPED | OUTPATIENT
Start: 2025-02-10

## 2025-02-10 NOTE — TELEPHONE ENCOUNTER
Refill per VO of Dr. Almanza  Last appt: 2/7/2024    Future Appointments   Date Time Provider Department Center   5/20/2025  1:40 PM Dong Almanza MD [facility] AM[facility]       Requested Prescriptions     Signed Prescriptions Disp Refills    carvedilol (COREG) 25 MG tablet 270 tablet 1     Sig: Take 1.5 tablets by mouth 2 times daily     Authorizing Provider: DONG ALMANZA     Ordering User: MARIA GUADALUPE GUIDO    lisinopril-hydroCHLOROthiazide (PRINZIDE;ZESTORETIC) 20-12.5 MG per tablet 90 tablet 1     Sig: Take 1 tablet by mouth daily     Authorizing Provider: DONG ALMANZA     Ordering User: MARIA GUADALUPE GUIDO    eplerenone (INSPRA) 25 MG tablet 90 tablet 1     Sig: Take 1 tablet by mouth daily     Authorizing Provider: DONG ALMANZA     Ordering User: MARIA GUADALUPE GUIDO

## 2025-02-21 RX ORDER — CARVEDILOL 25 MG/1
37.5 TABLET ORAL 2 TIMES DAILY
Qty: 270 TABLET | Refills: 1 | Status: SHIPPED | OUTPATIENT
Start: 2025-02-21

## 2025-04-01 RX ORDER — EPLERENONE 25 MG/1
25 TABLET ORAL DAILY
Qty: 90 TABLET | Refills: 0 | Status: SHIPPED | OUTPATIENT
Start: 2025-04-01

## 2025-05-27 ENCOUNTER — OFFICE VISIT (OUTPATIENT)
Age: 75
End: 2025-05-27
Payer: MEDICARE

## 2025-05-27 VITALS
WEIGHT: 270 LBS | HEIGHT: 65 IN | HEART RATE: 60 BPM | BODY MASS INDEX: 44.98 KG/M2 | SYSTOLIC BLOOD PRESSURE: 122 MMHG | OXYGEN SATURATION: 97 % | DIASTOLIC BLOOD PRESSURE: 68 MMHG

## 2025-05-27 DIAGNOSIS — I25.118 CORONARY ARTERY DISEASE OF NATIVE ARTERY OF NATIVE HEART WITH STABLE ANGINA PECTORIS: Primary | ICD-10-CM

## 2025-05-27 DIAGNOSIS — Z95.1 S/P CABG X 4: ICD-10-CM

## 2025-05-27 DIAGNOSIS — E78.5 DYSLIPIDEMIA: ICD-10-CM

## 2025-05-27 DIAGNOSIS — I10 PRIMARY HYPERTENSION: ICD-10-CM

## 2025-05-27 DIAGNOSIS — R06.02 SHORTNESS OF BREATH: ICD-10-CM

## 2025-05-27 PROCEDURE — 99214 OFFICE O/P EST MOD 30 MIN: CPT | Performed by: SPECIALIST

## 2025-05-27 PROCEDURE — 3078F DIAST BP <80 MM HG: CPT | Performed by: SPECIALIST

## 2025-05-27 PROCEDURE — 1123F ACP DISCUSS/DSCN MKR DOCD: CPT | Performed by: SPECIALIST

## 2025-05-27 PROCEDURE — 1126F AMNT PAIN NOTED NONE PRSNT: CPT | Performed by: SPECIALIST

## 2025-05-27 PROCEDURE — 3074F SYST BP LT 130 MM HG: CPT | Performed by: SPECIALIST

## 2025-05-27 PROCEDURE — 93010 ELECTROCARDIOGRAM REPORT: CPT | Performed by: SPECIALIST

## 2025-05-27 PROCEDURE — 1159F MED LIST DOCD IN RCRD: CPT | Performed by: SPECIALIST

## 2025-05-27 PROCEDURE — 93005 ELECTROCARDIOGRAM TRACING: CPT | Performed by: SPECIALIST

## 2025-05-27 NOTE — PROGRESS NOTES
Dong Almanza MD. [facility]          Patient: Donal Howe  : 1950      Today's Date: 2025        HISTORY OF PRESENT ILLNESS:     History of Present Illness:  MCDONALD class II. No CP, palpitation, orthopnea.         PAST MEDICAL HISTORY:     Past Medical History:   Diagnosis Date    CAD (coronary artery disease)     NSTEMI 3/21/15; CABG 3/24/15;  LVEF 55%    Diabetes (HCC)     Dyslipidemia     FH ischemic heart disease     GERD (gastroesophageal reflux disease)     HTN (hypertension)     Hypertension     NSTEMI (non-ST elevated myocardial infarction) (HCC)     NSTEMI 3/21/15    Obesity     LORI (obstructive sleep apnea)     surgery in past    LORI (obstructive sleep apnea)     on CPAP    Paronychia of great toe of right foot     S/P CABG x 4     CABG 3/24/15 - LIMA TO LAD, SEQUENTIAL VEIN GRAFT TO OM1, OM2; SVG TO DISTAL RCA       Past Surgical History:   Procedure Laterality Date    APPENDECTOMY      HEENT      eyes    ORTHOPEDIC SURGERY      L foot 4th toe amputation (diabetes)    [place] UNLISTED PROCEDURE CARDIAC SURGERY      CABG 3/24/2015             CURRENT MEDICATIONS:    .  Current Outpatient Medications   Medication Sig Dispense Refill    Tirzepatide (MOUNJARO) 5 MG/0.5ML SOAJ pen       eplerenone (INSPRA) 25 MG tablet TAKE ONE TABLET BY MOUTH EVERY DAY 90 tablet 0    carvedilol (COREG) 25 MG tablet Take 1.5 tablets by mouth 2 times daily 270 tablet 1    lisinopril-hydroCHLOROthiazide (PRINZIDE;ZESTORETIC) 20-12.5 MG per tablet Take 1 tablet by mouth daily 90 tablet 1    dilTIAZem (TIAZAC) 240 MG extended release capsule Take 1 capsule by mouth once daily 90 capsule 3    Cholecalciferol (HM VITAMIN D3) 100 MCG (4000 UT) CAPS Take 4,000 Units by mouth daily      acetaminophen (TYLENOL) 500 MG tablet Take 2 tablets by mouth every 6 hours as needed      aspirin 81 MG EC tablet Take 1 tablet by mouth daily      cetirizine (ZYRTEC) 10 MG tablet Take 1 tablet by mouth daily as needed      dapagliflozin

## 2025-05-27 NOTE — PROGRESS NOTES
Chief Complaint   Patient presents with    Coronary Artery Disease    Hypertension    Diabetes     DM2     Vitals:    05/27/25 1056   BP: 122/68   BP Site: Right Upper Arm   Patient Position: Sitting   Pulse: 60   SpO2: 97%   Weight: 122.5 kg (270 lb)   Height: 1.651 m (5' 5\")         Chest pain: DENIED     Recent hospital stays: DENIED     Refills: DENIED

## 2025-05-27 NOTE — PATIENT INSTRUCTIONS
Patient Education        Learning About the Mediterranean Diet  What is the Mediterranean diet?     The Mediterranean diet is a style of eating rather than a diet plan. It features foods eaten in [place], [place], southern [place], and other countries along the [place] Sea. It emphasizes eating foods like fish, fruits, vegetables, beans, high-fiber breads and whole grains, nuts, and olive oil. This style of eating includes limited red meat, cheese, and sweets.  Why choose the Mediterranean diet?  A Mediterranean-style diet may improve heart health. It contains more fat than other heart-healthy diets. But the fats are mainly from nuts, unsaturated oils (such as fish oils and olive oil), and certain nut or seed oils (such as canola, soybean, or flaxseed oil). These fats may help protect the heart and blood vessels.  How can you get started on the Mediterranean diet?  Here are some things you can do to switch to a more Mediterranean way of eating.  What to eat  Eat a variety of fruits and vegetables each day, such as grapes, blueberries, tomatoes, broccoli, peppers, figs, olives, spinach, eggplant, beans, lentils, and chickpeas.  Eat a variety of whole-grain foods each day, such as oats, brown rice, and whole wheat bread, pasta, and couscous.  Eat fish at least 2 times a week. Try tuna, salmon, mackerel, lake trout, herring, or sardines.  Eat moderate amounts of low-fat dairy products, such as milk, cheese, or yogurt.  Eat moderate amounts of poultry and eggs.  Choose healthy (unsaturated) fats, such as nuts, olive oil, and certain nut or seed oils like canola, soybean, and flaxseed.  Limit unhealthy (saturated) fats, such as butter, palm oil, and coconut oil. And limit fats found in animal products, such as meat and dairy products made with whole milk. Try to eat red meat only a few times a month in very small amounts.  Limit sweets and desserts to only a few times a week. This includes sugar-sweetened

## 2025-06-27 ENCOUNTER — TELEPHONE (OUTPATIENT)
Age: 75
End: 2025-06-27

## 2025-06-27 RX ORDER — ROSUVASTATIN CALCIUM 20 MG/1
20 TABLET, COATED ORAL NIGHTLY
Qty: 90 TABLET | Refills: 3 | Status: SHIPPED | OUTPATIENT
Start: 2025-06-27

## 2025-06-27 NOTE — TELEPHONE ENCOUNTER
Refill per VO of Dr. Almanza  Last appt: 5/27/2025    Future Appointments   Date Time Provider Department Center   6/2/2026 10:00 AM [facility]   6/2/2026 11:40 AM Dong Almanza MD [facility]       Requested Prescriptions     Signed Prescriptions Disp Refills    rosuvastatin (CRESTOR) 20 MG tablet 90 tablet 3     Sig: Take 1 tablet by mouth nightly     Authorizing Provider: DONG ALMANZA     Ordering User: CORINNA BERGMAN

## 2025-07-07 RX ORDER — CARVEDILOL 25 MG/1
TABLET ORAL
Qty: 135 TABLET | Refills: 3 | Status: SHIPPED | OUTPATIENT
Start: 2025-07-07

## 2025-07-07 NOTE — TELEPHONE ENCOUNTER
Refill per VO of Dr. Dong Almanza  Last appt: 5/27/2025    Future Appointments   Date Time Provider Department Center   6/2/2026 10:00 AM [facility]   6/2/2026 11:40 AM Dong Almanza MD [facility]       Requested Prescriptions     Signed Prescriptions Disp Refills    carvedilol (COREG) 25 MG tablet 135 tablet 3     Sig: TAKE ONE AND ONE-HALF TABLETS BY MOUTH TWICE A DAY     Authorizing Provider: DONG ALMANZA     Ordering User: CORINNA BERGMAN

## (undated) DEVICE — PADDING CST 4INX4YD --

## (undated) DEVICE — DIGIT TRAP FINGER GRASPING DEVICE: Brand: DIGIT TRAP

## (undated) DEVICE — SUTURE MCRYL SZ 3-0 L27IN ABSRB UD L26MM SH 1/2 CIR Y416H

## (undated) DEVICE — SYRINGE IRRIG 60ML SFT PLIABLE BLB EZ TO GRP 1 HND USE W/

## (undated) DEVICE — INFECTION CONTROL KIT SYS

## (undated) DEVICE — Device

## (undated) DEVICE — ROCKER SWITCH PENCIL BLADE ELECTRODE, HOLSTER: Brand: EDGE

## (undated) DEVICE — STERILE POLYISOPRENE POWDER-FREE SURGICAL GLOVES: Brand: PROTEXIS

## (undated) DEVICE — SOLUTION IV 1000ML 0.9% SOD CHL

## (undated) DEVICE — SURGICAL PROCEDURE PACK BASIN MAJ SET CUST NO CAUT

## (undated) DEVICE — COVER LT HNDL PLAS RIG 1 PER PK

## (undated) DEVICE — ANGIOGRAPHIC CATHETER: Brand: IMPULSE™

## (undated) DEVICE — TUBING PRSS MON L12IN PVC RIG NONEXPANDING M TO FEM CONN

## (undated) DEVICE — 3000CC GUARDIAN II: Brand: GUARDIAN

## (undated) DEVICE — SOL IRRIGATION INJ NACL 0.9% 500ML BTL

## (undated) DEVICE — STRAP,POSITIONING,KNEE/BODY,FOAM,4X60": Brand: MEDLINE

## (undated) DEVICE — TOWEL SURG W17XL27IN STD BLU COT NONFENESTRATED PREWASHED

## (undated) DEVICE — PACK,BASIC,SIRUS,V: Brand: MEDLINE

## (undated) DEVICE — REM POLYHESIVE ADULT PATIENT RETURN ELECTRODE: Brand: VALLEYLAB

## (undated) DEVICE — BNDG ELAS HK LOOP 3X5YD NS -- MATRIX

## (undated) DEVICE — TR BAND RADIAL ARTERY COMPRESSION DEVICE: Brand: TR BAND

## (undated) DEVICE — BANDAGE COBAN 4 IN COMPR W4INXL5YD FOAM COHESIVE QUIK STK SELF ADH SFT

## (undated) DEVICE — BNDG ELAS HK LOOP 6X5YD NS -- MATRIX

## (undated) DEVICE — DRAPE,EXTREMITY,89X128,STERILE: Brand: MEDLINE

## (undated) DEVICE — PADDING CAST 4 INX5 YD STRL

## (undated) DEVICE — SUTURE ETHLN SZ 4-0 L18IN NONABSORBABLE BLK L19MM PS-2 3/8 1667H

## (undated) DEVICE — DRAPE,REIN 53X77,STERILE: Brand: MEDLINE

## (undated) DEVICE — SHEET, DRAPE, SPLIT, STERILE: Brand: MEDLINE

## (undated) DEVICE — GAUZE,SPONGE,FLUFF,6"X6.75",STRL,5/TRAY: Brand: MEDLINE

## (undated) DEVICE — GLIDESHEATH SLENDER STAINLESS STEEL KIT: Brand: GLIDESHEATH SLENDER

## (undated) DEVICE — TUBING SUCT 10FR MAL ALUM SHFT FN CAP VENT UNIV CONN W/ OBT

## (undated) DEVICE — ZIMMER® STERILE DISPOSABLE TOURNIQUET CUFF WITH PROTECTIVE SLEEVE AND PLC, DUAL PORT, SINGLE BLADDER, 18 IN. (46 CM)

## (undated) DEVICE — SPONGE GZ W4XL4IN COT 12 PLY TYP VII WVN C FLD DSGN

## (undated) DEVICE — INTENDED FOR TISSUE SEPARATION, AND OTHER PROCEDURES THAT REQUIRE A SHARP SURGICAL BLADE TO PUNCTURE OR CUT.: Brand: BARD-PARKER ® CARBON RIB-BACK BLADES

## (undated) DEVICE — BANDAGE,GAUZE,CONFORMING,3"X75",STRL,LF: Brand: MEDLINE

## (undated) DEVICE — HEART CATH-SFMC: Brand: MEDLINE INDUSTRIES, INC.

## (undated) DEVICE — PREP SKN CHLRAPRP APL 26ML STR --

## (undated) DEVICE — CATHETER DIAG 5FR L100CM AL AL 1.0 CRV SZ DBL BRAID WIRE

## (undated) DEVICE — BANDAGE,ELASTIC,ESMARK,STERILE,4"X9',LF: Brand: MEDLINE

## (undated) DEVICE — CATH 5F 100CM JR40 -- DXTERITY

## (undated) DEVICE — INSULATED BLADE ELECTRODE: Brand: EDGE

## (undated) DEVICE — GOWN,SIRUS,NONRNF,SETINSLV,XL,20/CS: Brand: MEDLINE

## (undated) DEVICE — CATH 5F 100CM JL40 -- DXTERITY

## (undated) DEVICE — BLADE SAW OSC COARSE 25X9MM --

## (undated) DEVICE — CATHETER ANGIO L100CM DIA5FR MP A CRV COR INSLIDE ADD DBL

## (undated) DEVICE — SUT ETHLN 3-0 18IN PS1 BLK --

## (undated) DEVICE — SYRINGE MED 10ML RED POLYCARB BRL FIX M LUER CONN FLAT GRP

## (undated) DEVICE — SWAB CULT DBL W/O CHAR RAYON TIP AMIES GEL CLMN FOR COLL

## (undated) DEVICE — (D)BNDG COHESIVE 6X5YD TAN LTX -- DUPE USE ITEM 357348

## (undated) DEVICE — SUPPORT WRST AD W3.5XL9IN DIA14.5IN ART SFT ADJ HK AND LOOP

## (undated) DEVICE — SUTURE VCRL SZ 3-0 L27IN ABSRB UD L24MM PS-1 3/8 CIR PRIM J936H